# Patient Record
Sex: FEMALE | Race: WHITE | NOT HISPANIC OR LATINO | Employment: OTHER | URBAN - METROPOLITAN AREA
[De-identification: names, ages, dates, MRNs, and addresses within clinical notes are randomized per-mention and may not be internally consistent; named-entity substitution may affect disease eponyms.]

---

## 2023-03-09 LAB
ANION GAP IN SER/PLAS: 12 MMOL/L (ref 10–20)
CALCIUM (MG/DL) IN SER/PLAS: 9.1 MG/DL (ref 8.6–10.3)
CARBON DIOXIDE, TOTAL (MMOL/L) IN SER/PLAS: 30 MMOL/L (ref 21–32)
CHLORIDE (MMOL/L) IN SER/PLAS: 103 MMOL/L (ref 98–107)
CREATININE (MG/DL) IN SER/PLAS: 1.11 MG/DL (ref 0.5–1.05)
GFR FEMALE: 50 ML/MIN/1.73M2
GLUCOSE (MG/DL) IN SER/PLAS: 105 MG/DL (ref 74–99)
MAGNESIUM (MG/DL) IN SER/PLAS: 2.03 MG/DL (ref 1.6–2.4)
POTASSIUM (MMOL/L) IN SER/PLAS: 4.7 MMOL/L (ref 3.5–5.3)
SODIUM (MMOL/L) IN SER/PLAS: 140 MMOL/L (ref 136–145)
UREA NITROGEN (MG/DL) IN SER/PLAS: 23 MG/DL (ref 6–23)

## 2023-04-14 LAB
ANION GAP IN SER/PLAS: 11 MMOL/L (ref 10–20)
CALCIUM (MG/DL) IN SER/PLAS: 9.2 MG/DL (ref 8.6–10.3)
CARBON DIOXIDE, TOTAL (MMOL/L) IN SER/PLAS: 29 MMOL/L (ref 21–32)
CHLORIDE (MMOL/L) IN SER/PLAS: 103 MMOL/L (ref 98–107)
CREATININE (MG/DL) IN SER/PLAS: 1.05 MG/DL (ref 0.5–1.05)
GFR FEMALE: 53 ML/MIN/1.73M2
GLUCOSE (MG/DL) IN SER/PLAS: 89 MG/DL (ref 74–99)
NATRIURETIC PEPTIDE B (PG/ML) IN SER/PLAS: 359 PG/ML (ref 0–99)
POTASSIUM (MMOL/L) IN SER/PLAS: 4.3 MMOL/L (ref 3.5–5.3)
SODIUM (MMOL/L) IN SER/PLAS: 139 MMOL/L (ref 136–145)
UREA NITROGEN (MG/DL) IN SER/PLAS: 21 MG/DL (ref 6–23)

## 2023-04-28 LAB
ANION GAP IN SER/PLAS: 11 MMOL/L (ref 10–20)
CALCIUM (MG/DL) IN SER/PLAS: 9.1 MG/DL (ref 8.6–10.3)
CARBON DIOXIDE, TOTAL (MMOL/L) IN SER/PLAS: 30 MMOL/L (ref 21–32)
CHLORIDE (MMOL/L) IN SER/PLAS: 103 MMOL/L (ref 98–107)
CREATININE (MG/DL) IN SER/PLAS: 1.04 MG/DL (ref 0.5–1.05)
GFR FEMALE: 54 ML/MIN/1.73M2
GLUCOSE (MG/DL) IN SER/PLAS: 109 MG/DL (ref 74–99)
NATRIURETIC PEPTIDE B (PG/ML) IN SER/PLAS: 516 PG/ML (ref 0–99)
POTASSIUM (MMOL/L) IN SER/PLAS: 4.5 MMOL/L (ref 3.5–5.3)
SODIUM (MMOL/L) IN SER/PLAS: 139 MMOL/L (ref 136–145)
UREA NITROGEN (MG/DL) IN SER/PLAS: 21 MG/DL (ref 6–23)

## 2023-05-15 LAB
ANION GAP IN SER/PLAS: 14 MMOL/L (ref 10–20)
C PEPTIDE (NG/ML) IN SER/PLAS: NORMAL
CALCIUM (MG/DL) IN SER/PLAS: 8.7 MG/DL (ref 8.6–10.3)
CARBON DIOXIDE, TOTAL (MMOL/L) IN SER/PLAS: 29 MMOL/L (ref 21–32)
CHLORIDE (MMOL/L) IN SER/PLAS: 102 MMOL/L (ref 98–107)
CREATININE (MG/DL) IN SER/PLAS: 1.17 MG/DL (ref 0.5–1.05)
GFR FEMALE: 47 ML/MIN/1.73M2
GLUCOSE (MG/DL) IN SER/PLAS: 96 MG/DL (ref 74–99)
NATRIURETIC PEPTIDE B (PG/ML) IN SER/PLAS: 313 PG/ML (ref 0–99)
POTASSIUM (MMOL/L) IN SER/PLAS: 4.6 MMOL/L (ref 3.5–5.3)
SODIUM (MMOL/L) IN SER/PLAS: 140 MMOL/L (ref 136–145)
UREA NITROGEN (MG/DL) IN SER/PLAS: 33 MG/DL (ref 6–23)

## 2023-06-01 LAB
APPEARANCE, URINE: CLEAR
BACTERIA, URINE: ABNORMAL /HPF
BILIRUBIN, URINE: NEGATIVE
BLOOD, URINE: NEGATIVE
CHOLESTEROL (MG/DL) IN SER/PLAS: 134 MG/DL (ref 0–199)
CHOLESTEROL IN HDL (MG/DL) IN SER/PLAS: 46 MG/DL
CHOLESTEROL/HDL RATIO: 2.9
COLOR, URINE: ABNORMAL
GLUCOSE, URINE: NEGATIVE MG/DL
HYALINE CASTS, URINE: ABNORMAL /LPF
KETONES, URINE: NEGATIVE MG/DL
LDL: 69 MG/DL (ref 0–99)
LEUKOCYTE ESTERASE, URINE: ABNORMAL
MUCUS, URINE: ABNORMAL /LPF
NITRITE, URINE: NEGATIVE
PH, URINE: 5 (ref 5–8)
PROTEIN, URINE: NEGATIVE MG/DL
RBC, URINE: 2 /HPF (ref 0–5)
SPECIFIC GRAVITY, URINE: 1.01 (ref 1–1.03)
SQUAMOUS EPITHELIAL CELLS, URINE: <1 /HPF
THYROTROPIN (MIU/L) IN SER/PLAS BY DETECTION LIMIT <= 0.05 MIU/L: 3.35 MIU/L (ref 0.44–3.98)
TRANSITIONAL EPITHELIAL CELLS, URINE: <1 /HPF
TRIGLYCERIDE (MG/DL) IN SER/PLAS: 95 MG/DL (ref 0–149)
UROBILINOGEN, URINE: <2 MG/DL (ref 0–1.9)
VLDL: 19 MG/DL (ref 0–40)
WBC, URINE: 3 /HPF (ref 0–5)

## 2023-07-22 LAB
ALANINE AMINOTRANSFERASE (SGPT) (U/L) IN SER/PLAS: 22 U/L (ref 7–45)
ALBUMIN (G/DL) IN SER/PLAS: 4.2 G/DL (ref 3.4–5)
ALKALINE PHOSPHATASE (U/L) IN SER/PLAS: 69 U/L (ref 33–136)
ANION GAP IN SER/PLAS: 13 MMOL/L (ref 10–20)
APPEARANCE, URINE: ABNORMAL
ASPARTATE AMINOTRANSFERASE (SGOT) (U/L) IN SER/PLAS: 23 U/L (ref 9–39)
BASOPHILS (10*3/UL) IN BLOOD BY AUTOMATED COUNT: 0.03 X10E9/L (ref 0–0.1)
BASOPHILS/100 LEUKOCYTES IN BLOOD BY AUTOMATED COUNT: 0.7 % (ref 0–2)
BILIRUBIN TOTAL (MG/DL) IN SER/PLAS: 0.8 MG/DL (ref 0–1.2)
BILIRUBIN, URINE: NEGATIVE
BLOOD, URINE: NEGATIVE
CALCIUM (MG/DL) IN SER/PLAS: 9.4 MG/DL (ref 8.6–10.3)
CARBON DIOXIDE, TOTAL (MMOL/L) IN SER/PLAS: 29 MMOL/L (ref 21–32)
CHLORIDE (MMOL/L) IN SER/PLAS: 104 MMOL/L (ref 98–107)
CHOLESTEROL (MG/DL) IN SER/PLAS: 122 MG/DL (ref 0–199)
CHOLESTEROL IN HDL (MG/DL) IN SER/PLAS: 42.9 MG/DL
CHOLESTEROL/HDL RATIO: 2.8
COLOR, URINE: YELLOW
CREATININE (MG/DL) IN SER/PLAS: 1.05 MG/DL (ref 0.5–1.05)
EOSINOPHILS (10*3/UL) IN BLOOD BY AUTOMATED COUNT: 0.08 X10E9/L (ref 0–0.4)
EOSINOPHILS/100 LEUKOCYTES IN BLOOD BY AUTOMATED COUNT: 1.7 % (ref 0–6)
ERYTHROCYTE DISTRIBUTION WIDTH (RATIO) BY AUTOMATED COUNT: 17 % (ref 11.5–14.5)
ERYTHROCYTE MEAN CORPUSCULAR HEMOGLOBIN CONCENTRATION (G/DL) BY AUTOMATED: 30.4 G/DL (ref 32–36)
ERYTHROCYTE MEAN CORPUSCULAR VOLUME (FL) BY AUTOMATED COUNT: 98 FL (ref 80–100)
ERYTHROCYTES (10*6/UL) IN BLOOD BY AUTOMATED COUNT: 4.11 X10E12/L (ref 4–5.2)
GFR FEMALE: 53 ML/MIN/1.73M2
GLUCOSE (MG/DL) IN SER/PLAS: 102 MG/DL (ref 74–99)
GLUCOSE, URINE: NEGATIVE MG/DL
HEMATOCRIT (%) IN BLOOD BY AUTOMATED COUNT: 40.4 % (ref 36–46)
HEMOGLOBIN (G/DL) IN BLOOD: 12.3 G/DL (ref 12–16)
IMMATURE GRANULOCYTES/100 LEUKOCYTES IN BLOOD BY AUTOMATED COUNT: 0.4 % (ref 0–0.9)
KETONES, URINE: NEGATIVE MG/DL
LDL: 64 MG/DL (ref 0–99)
LEUKOCYTE ESTERASE, URINE: ABNORMAL
LEUKOCYTES (10*3/UL) IN BLOOD BY AUTOMATED COUNT: 4.6 X10E9/L (ref 4.4–11.3)
LYMPHOCYTES (10*3/UL) IN BLOOD BY AUTOMATED COUNT: 0.98 X10E9/L (ref 0.8–3)
LYMPHOCYTES/100 LEUKOCYTES IN BLOOD BY AUTOMATED COUNT: 21.3 % (ref 13–44)
MONOCYTES (10*3/UL) IN BLOOD BY AUTOMATED COUNT: 0.49 X10E9/L (ref 0.05–0.8)
MONOCYTES/100 LEUKOCYTES IN BLOOD BY AUTOMATED COUNT: 10.6 % (ref 2–10)
MUCUS, URINE: NORMAL /LPF
NEUTROPHILS (10*3/UL) IN BLOOD BY AUTOMATED COUNT: 3.01 X10E9/L (ref 1.6–5.5)
NEUTROPHILS/100 LEUKOCYTES IN BLOOD BY AUTOMATED COUNT: 65.3 % (ref 40–80)
NITRITE, URINE: NEGATIVE
NRBC (PER 100 WBCS) BY AUTOMATED COUNT: 0 /100 WBC (ref 0–0)
PH, URINE: 6 (ref 5–8)
PLATELETS (10*3/UL) IN BLOOD AUTOMATED COUNT: 126 X10E9/L (ref 150–450)
POTASSIUM (MMOL/L) IN SER/PLAS: 4.5 MMOL/L (ref 3.5–5.3)
PROTEIN TOTAL: 7 G/DL (ref 6.4–8.2)
PROTEIN, URINE: NEGATIVE MG/DL
RBC, URINE: 1 /HPF (ref 0–5)
SODIUM (MMOL/L) IN SER/PLAS: 141 MMOL/L (ref 136–145)
SPECIFIC GRAVITY, URINE: 1.01 (ref 1–1.03)
SQUAMOUS EPITHELIAL CELLS, URINE: 1 /HPF
THYROTROPIN (MIU/L) IN SER/PLAS BY DETECTION LIMIT <= 0.05 MIU/L: 2.99 MIU/L (ref 0.44–3.98)
TRIGLYCERIDE (MG/DL) IN SER/PLAS: 78 MG/DL (ref 0–149)
UREA NITROGEN (MG/DL) IN SER/PLAS: 29 MG/DL (ref 6–23)
UROBILINOGEN, URINE: <2 MG/DL (ref 0–1.9)
VLDL: 16 MG/DL (ref 0–40)
WBC, URINE: 1 /HPF (ref 0–5)

## 2023-10-17 ENCOUNTER — LAB (OUTPATIENT)
Dept: LAB | Facility: LAB | Age: 83
End: 2023-10-17
Payer: MEDICARE

## 2023-10-17 DIAGNOSIS — E03.9 HYPOTHYROIDISM, UNSPECIFIED: ICD-10-CM

## 2023-10-17 DIAGNOSIS — N18.9 CHRONIC KIDNEY DISEASE, UNSPECIFIED: Primary | ICD-10-CM

## 2023-10-17 DIAGNOSIS — E66.9 OBESITY, UNSPECIFIED: ICD-10-CM

## 2023-10-17 LAB
ALBUMIN SERPL BCP-MCNC: 4.5 G/DL (ref 3.4–5)
ALP SERPL-CCNC: 62 U/L (ref 33–136)
ALT SERPL W P-5'-P-CCNC: 18 U/L (ref 7–45)
ANION GAP SERPL CALC-SCNC: 13 MMOL/L (ref 10–20)
APPEARANCE UR: CLEAR
AST SERPL W P-5'-P-CCNC: 25 U/L (ref 9–39)
BASOPHILS # BLD AUTO: 0.03 X10*3/UL (ref 0–0.1)
BASOPHILS NFR BLD AUTO: 0.7 %
BILIRUB SERPL-MCNC: 0.7 MG/DL (ref 0–1.2)
BILIRUB UR STRIP.AUTO-MCNC: NEGATIVE MG/DL
BUN SERPL-MCNC: 26 MG/DL (ref 6–23)
CALCIUM SERPL-MCNC: 9.2 MG/DL (ref 8.6–10.3)
CHLORIDE SERPL-SCNC: 102 MMOL/L (ref 98–107)
CHOLEST SERPL-MCNC: 178 MG/DL (ref 0–199)
CHOLESTEROL/HDL RATIO: 4.1
CO2 SERPL-SCNC: 30 MMOL/L (ref 21–32)
COLOR UR: ABNORMAL
CREAT SERPL-MCNC: 1.11 MG/DL (ref 0.5–1.05)
EOSINOPHIL # BLD AUTO: 0.09 X10*3/UL (ref 0–0.4)
EOSINOPHIL NFR BLD AUTO: 2 %
ERYTHROCYTE [DISTWIDTH] IN BLOOD BY AUTOMATED COUNT: 15.8 % (ref 11.5–14.5)
GFR SERPL CREATININE-BSD FRML MDRD: 50 ML/MIN/1.73M*2
GLUCOSE SERPL-MCNC: 98 MG/DL (ref 74–99)
GLUCOSE UR STRIP.AUTO-MCNC: NEGATIVE MG/DL
HCT VFR BLD AUTO: 43.8 % (ref 36–46)
HDLC SERPL-MCNC: 43.2 MG/DL
HGB BLD-MCNC: 13.6 G/DL (ref 12–16)
HYALINE CASTS #/AREA URNS AUTO: ABNORMAL /LPF
IMM GRANULOCYTES # BLD AUTO: 0.02 X10*3/UL (ref 0–0.5)
IMM GRANULOCYTES NFR BLD AUTO: 0.4 % (ref 0–0.9)
KETONES UR STRIP.AUTO-MCNC: NEGATIVE MG/DL
LDLC SERPL CALC-MCNC: 106 MG/DL
LEUKOCYTE ESTERASE UR QL STRIP.AUTO: ABNORMAL
LYMPHOCYTES # BLD AUTO: 1.1 X10*3/UL (ref 0.8–3)
LYMPHOCYTES NFR BLD AUTO: 24.1 %
MCH RBC QN AUTO: 30.6 PG (ref 26–34)
MCHC RBC AUTO-ENTMCNC: 31.1 G/DL (ref 32–36)
MCV RBC AUTO: 98 FL (ref 80–100)
MONOCYTES # BLD AUTO: 0.46 X10*3/UL (ref 0.05–0.8)
MONOCYTES NFR BLD AUTO: 10.1 %
MUCOUS THREADS #/AREA URNS AUTO: ABNORMAL /LPF
NEUTROPHILS # BLD AUTO: 2.86 X10*3/UL (ref 1.6–5.5)
NEUTROPHILS NFR BLD AUTO: 62.7 %
NITRITE UR QL STRIP.AUTO: NEGATIVE
NON HDL CHOLESTEROL: 135 MG/DL (ref 0–149)
NRBC BLD-RTO: 0 /100 WBCS (ref 0–0)
PH UR STRIP.AUTO: 5 [PH]
PLATELET # BLD AUTO: 154 X10*3/UL (ref 150–450)
PMV BLD AUTO: 10.5 FL (ref 7.5–11.5)
POTASSIUM SERPL-SCNC: 4.7 MMOL/L (ref 3.5–5.3)
PROT SERPL-MCNC: 7.2 G/DL (ref 6.4–8.2)
PROT UR STRIP.AUTO-MCNC: NEGATIVE MG/DL
RBC # BLD AUTO: 4.45 X10*6/UL (ref 4–5.2)
RBC # UR STRIP.AUTO: NEGATIVE /UL
RBC #/AREA URNS AUTO: ABNORMAL /HPF
SODIUM SERPL-SCNC: 140 MMOL/L (ref 136–145)
SP GR UR STRIP.AUTO: 1.01
TRIGL SERPL-MCNC: 143 MG/DL (ref 0–149)
TSH SERPL-ACNC: 2.81 MIU/L (ref 0.44–3.98)
UROBILINOGEN UR STRIP.AUTO-MCNC: <2 MG/DL
VLDL: 29 MG/DL (ref 0–40)
WBC # BLD AUTO: 4.6 X10*3/UL (ref 4.4–11.3)
WBC #/AREA URNS AUTO: ABNORMAL /HPF

## 2023-10-17 PROCEDURE — 85025 COMPLETE CBC W/AUTO DIFF WBC: CPT

## 2023-10-17 PROCEDURE — 87086 URINE CULTURE/COLONY COUNT: CPT

## 2023-10-17 PROCEDURE — 80061 LIPID PANEL: CPT

## 2023-10-17 PROCEDURE — 36415 COLL VENOUS BLD VENIPUNCTURE: CPT

## 2023-10-17 PROCEDURE — 81001 URINALYSIS AUTO W/SCOPE: CPT

## 2023-10-17 PROCEDURE — 84443 ASSAY THYROID STIM HORMONE: CPT

## 2023-10-17 PROCEDURE — 80053 COMPREHEN METABOLIC PANEL: CPT

## 2023-10-18 LAB — BACTERIA UR CULT: NORMAL

## 2023-12-03 PROBLEM — E03.9 HYPOTHYROID: Status: ACTIVE | Noted: 2023-12-03

## 2023-12-03 PROBLEM — R53.83 FATIGUE: Status: ACTIVE | Noted: 2023-12-03

## 2023-12-03 PROBLEM — M54.50 LOWER BACK PAIN: Status: ACTIVE | Noted: 2023-12-03

## 2023-12-03 PROBLEM — Z71.85 VACCINE COUNSELING: Status: ACTIVE | Noted: 2023-12-03

## 2023-12-03 PROBLEM — R60.0 EDEMA LEG: Status: ACTIVE | Noted: 2023-12-03

## 2023-12-03 PROBLEM — I35.0 AORTIC STENOSIS: Chronic | Status: ACTIVE | Noted: 2023-12-03

## 2023-12-03 PROBLEM — R06.02 SHORTNESS OF BREATH AT REST: Status: ACTIVE | Noted: 2023-12-03

## 2023-12-03 PROBLEM — D64.9 ANEMIA: Status: ACTIVE | Noted: 2023-12-03

## 2023-12-03 PROBLEM — M35.01 KERATITIS SICCA, BILATERAL (MULTI): Status: ACTIVE | Noted: 2018-10-26

## 2023-12-03 PROBLEM — D69.6 THROMBOCYTOPENIA (CMS-HCC): Status: ACTIVE | Noted: 2023-05-11

## 2023-12-03 PROBLEM — H35.363 DEGENERATIVE DRUSEN OF BOTH EYES: Status: ACTIVE | Noted: 2018-10-26

## 2023-12-03 PROBLEM — R09.89 PULMONARY VASCULAR CONGESTION: Status: ACTIVE | Noted: 2023-12-03

## 2023-12-03 PROBLEM — H25.13 SENILE NUCLEAR CATARACT, BILATERAL: Status: ACTIVE | Noted: 2018-10-26

## 2023-12-03 PROBLEM — J84.9 INTERSTITIAL LUNG DISEASE (MULTI): Status: ACTIVE | Noted: 2023-12-03

## 2023-12-03 PROBLEM — E66.9 OBESITY: Status: ACTIVE | Noted: 2023-12-03

## 2023-12-03 PROBLEM — R73.9 HYPERGLYCEMIA: Status: ACTIVE | Noted: 2023-12-03

## 2023-12-03 PROBLEM — I48.91 ATRIAL FIBRILLATION (MULTI): Chronic | Status: ACTIVE | Noted: 2023-12-03

## 2023-12-03 PROBLEM — H16.223 KERATITIS SICCA, BILATERAL: Status: ACTIVE | Noted: 2018-10-26

## 2023-12-03 PROBLEM — M81.0 OSTEOPOROSIS: Status: ACTIVE | Noted: 2023-12-03

## 2023-12-03 PROBLEM — R00.1 BRADYCARDIA: Status: ACTIVE | Noted: 2023-12-03

## 2023-12-03 PROBLEM — R00.1 BRADYCARDIA: Chronic | Status: ACTIVE | Noted: 2023-12-03

## 2023-12-03 PROBLEM — R31.9 HEMATURIA: Status: ACTIVE | Noted: 2023-12-03

## 2023-12-03 PROBLEM — I48.0 PAROXYSMAL ATRIAL FIBRILLATION (MULTI): Chronic | Status: ACTIVE | Noted: 2023-12-03

## 2023-12-03 PROBLEM — I10 HYPERTENSION: Status: ACTIVE | Noted: 2023-12-03

## 2023-12-03 PROBLEM — G47.33 OSA ON CPAP: Status: ACTIVE | Noted: 2023-12-03

## 2023-12-03 PROBLEM — R79.89 LOW VITAMIN D LEVEL: Status: ACTIVE | Noted: 2023-12-03

## 2023-12-03 PROBLEM — N18.9 CHRONIC KIDNEY DISEASE (CKD): Status: ACTIVE | Noted: 2023-12-03

## 2023-12-03 PROBLEM — I34.0 MITRAL VALVE REGURGITATION: Chronic | Status: ACTIVE | Noted: 2023-12-03

## 2023-12-03 PROBLEM — I48.91 AFIB (MULTI): Status: ACTIVE | Noted: 2023-12-03

## 2023-12-05 ENCOUNTER — OFFICE VISIT (OUTPATIENT)
Dept: CARDIOLOGY | Facility: CLINIC | Age: 83
End: 2023-12-05
Payer: MEDICARE

## 2023-12-05 VITALS
HEART RATE: 87 BPM | OXYGEN SATURATION: 91 % | SYSTOLIC BLOOD PRESSURE: 132 MMHG | BODY MASS INDEX: 37.77 KG/M2 | DIASTOLIC BLOOD PRESSURE: 82 MMHG | WEIGHT: 234 LBS

## 2023-12-05 DIAGNOSIS — I35.0 NONRHEUMATIC AORTIC VALVE STENOSIS: Chronic | ICD-10-CM

## 2023-12-05 DIAGNOSIS — I48.0 PAROXYSMAL ATRIAL FIBRILLATION (MULTI): Primary | Chronic | ICD-10-CM

## 2023-12-05 DIAGNOSIS — G47.33 OSA ON CPAP: Chronic | ICD-10-CM

## 2023-12-05 DIAGNOSIS — I34.0 NONRHEUMATIC MITRAL VALVE REGURGITATION: Chronic | ICD-10-CM

## 2023-12-05 DIAGNOSIS — I1A.0 RESISTANT HYPERTENSION: Chronic | ICD-10-CM

## 2023-12-05 PROBLEM — I10 HYPERTENSION: Chronic | Status: ACTIVE | Noted: 2023-12-03

## 2023-12-05 PROCEDURE — 3079F DIAST BP 80-89 MM HG: CPT | Performed by: INTERNAL MEDICINE

## 2023-12-05 PROCEDURE — 1036F TOBACCO NON-USER: CPT | Performed by: INTERNAL MEDICINE

## 2023-12-05 PROCEDURE — 3075F SYST BP GE 130 - 139MM HG: CPT | Performed by: INTERNAL MEDICINE

## 2023-12-05 PROCEDURE — 99214 OFFICE O/P EST MOD 30 MIN: CPT | Performed by: INTERNAL MEDICINE

## 2023-12-05 PROCEDURE — 1159F MED LIST DOCD IN RCRD: CPT | Performed by: INTERNAL MEDICINE

## 2023-12-05 RX ORDER — ALBUTEROL SULFATE 90 UG/1
AEROSOL, METERED RESPIRATORY (INHALATION)
COMMUNITY
Start: 2019-10-28

## 2023-12-05 RX ORDER — METHOTREXATE 2.5 MG/1
15 TABLET ORAL WEEKLY
COMMUNITY
Start: 2019-08-29

## 2023-12-05 RX ORDER — LEVOTHYROXINE SODIUM 25 UG/1
25 TABLET ORAL DAILY
COMMUNITY
Start: 2020-06-08

## 2023-12-05 RX ORDER — MULTIVITAMIN
TABLET ORAL
COMMUNITY
Start: 2006-12-05

## 2023-12-05 RX ORDER — DOXAZOSIN 1 MG/1
1 TABLET ORAL 2 TIMES DAILY
COMMUNITY
Start: 2019-05-15 | End: 2024-05-23 | Stop reason: SDUPTHER

## 2023-12-05 RX ORDER — APIXABAN 5 MG/1
5 TABLET, FILM COATED ORAL 2 TIMES DAILY
COMMUNITY
End: 2023-12-18 | Stop reason: SDUPTHER

## 2023-12-05 RX ORDER — FUROSEMIDE 40 MG/1
40 TABLET ORAL DAILY
COMMUNITY
Start: 2023-05-01

## 2023-12-05 RX ORDER — ALENDRONATE SODIUM 70 MG/1
TABLET ORAL
COMMUNITY
Start: 2023-07-12

## 2023-12-05 RX ORDER — LISINOPRIL 40 MG/1
40 TABLET ORAL 2 TIMES DAILY
COMMUNITY
Start: 2017-09-13

## 2023-12-05 RX ORDER — AMLODIPINE BESYLATE 10 MG/1
TABLET ORAL EVERY 24 HOURS
COMMUNITY
Start: 2019-05-15 | End: 2024-03-27 | Stop reason: SDUPTHER

## 2023-12-05 RX ORDER — FLAXSEED OIL 1000 MG
1 CAPSULE ORAL DAILY
COMMUNITY
Start: 2012-11-06

## 2023-12-05 RX ORDER — DOCUSATE SODIUM 100 MG/1
100 CAPSULE, LIQUID FILLED ORAL EVERY 12 HOURS PRN
COMMUNITY
Start: 2019-10-28

## 2023-12-05 RX ORDER — LORATADINE 10 MG/1
1 TABLET ORAL AS NEEDED
COMMUNITY
Start: 2020-05-19

## 2023-12-05 RX ORDER — NYSTATIN 100000 [USP'U]/G
POWDER TOPICAL
COMMUNITY
Start: 2019-10-28

## 2023-12-05 RX ORDER — UBIDECARENONE 75 MG
CAPSULE ORAL
COMMUNITY

## 2023-12-05 RX ORDER — DEXTROMETHORPHAN HYDROBROMIDE, GUAIFENESIN 5; 100 MG/5ML; MG/5ML
650 LIQUID ORAL 2 TIMES DAILY
COMMUNITY
Start: 2019-05-15

## 2023-12-05 RX ORDER — FOLIC ACID 1 MG/1
1 TABLET ORAL DAILY
COMMUNITY
Start: 2019-08-29

## 2023-12-05 RX ORDER — POTASSIUM CHLORIDE 1500 MG/1
1 TABLET, EXTENDED RELEASE ORAL
COMMUNITY
Start: 2023-02-24

## 2023-12-05 RX ORDER — HYDROXYCHLOROQUINE SULFATE 200 MG/1
200 TABLET, FILM COATED ORAL EVERY 12 HOURS
COMMUNITY
Start: 2023-11-08

## 2023-12-05 NOTE — PROGRESS NOTES
Referred by No ref. provider found    HPI Feeling well. No CP. Improved SOB and edema since starting CPAP. BP excellent at home.     Past Medical History:  Problem List Items Addressed This Visit    None       Past Medical History:   Diagnosis Date    Aortic stenosis 12/03/2023    mild    Atrial fibrillation (CMS/HCC) 12/03/2023    Bradycardia 12/03/2023    Off BB    Essential (primary) hypertension     Benign hypertension    Mitral valve regurgitation 12/03/2023    Mild-moderate    Paroxysmal atrial fibrillation (CMS/HCC) 12/03/2023    PAF newly diagnosed in August 2018. HR controlled by itself. On Eliquis. [id number] score 4.             Past Surgical History:  She has no past surgical history on file.      Social History:  She reports that she quit smoking about 25 years ago. Her smoking use included cigarettes. She has a 50.00 pack-year smoking history. She has been exposed to tobacco smoke. She has never used smokeless tobacco. No history on file for alcohol use and drug use.    Family History:  No family history on file.     Allergies:  Ciprofloxacin, Ibuprofen, Penicillins, Statins-hmg-coa reductase inhibitors, Sulfa (sulfonamide antibiotics), and Zoster vaccine live (pf)    Outpatient Medications:  No current outpatient medications     Last Recorded Vitals:  Vitals:    12/05/23 1608   BP: 132/82   BP Location: Left arm   Patient Position: Sitting   Pulse: 87   SpO2: 91%   Weight: 106 kg (234 lb)       Physical Exam    Physical  Patient is alert and oriented x3.  HEENT is unremarkable mucous members are moist  Neck no JVP no bruits upstrokes are full no thyromegaly  Lungs are clear bilaterally.  No wheezing crackles or rales  Heart regular rhythm normal S1-S2 there is no S3 no murmurs are heard.  Abdomen is soft vessels are positive nontender nondistended no organomegaly no pulsatile masses  Extremities have no edema.  Distal pulses present palpable.  Neuro is grossly nonfocal  Skin has no rashes     Last  Labs:  CBC -  Lab Results   Component Value Date    WBC 4.6 10/17/2023    HGB 13.6 10/17/2023    HCT 43.8 10/17/2023    MCV 98 10/17/2023     10/17/2023       CMP -  Lab Results   Component Value Date    CALCIUM 9.2 10/17/2023    PROT 7.2 10/17/2023    ALBUMIN 4.5 10/17/2023    AST 25 10/17/2023    ALT 18 10/17/2023    ALKPHOS 62 10/17/2023    BILITOT 0.7 10/17/2023       LIPID PANEL -   Lab Results   Component Value Date    CHOL 178 10/17/2023    HDL 43.2 10/17/2023    CHHDL 4.1 10/17/2023    VLDL 29 10/17/2023    TRIG 143 10/17/2023    NHDL 135 10/17/2023       RENAL FUNCTION PANEL -   Lab Results   Component Value Date    K 4.7 10/17/2023       Lab Results   Component Value Date     (H) 05/15/2023    HGBA1C 5.0 08/08/2022     Procedure  VENOUS U/S [06/09/2023]: No ev/of DVT.      ECHO [02/03/2023]: EF 55-60%. Low normal RVSF. LA mod dial. RA mild-mod dial. Mild-mod MR. AV sclerosis.Echo 9/4/18 EF 60-65%     ECHO [09/21/2022]: EF 60%, Mild AS     CXR [05/15/2019]: No interval change from 3/1/2018.     PHARM NST [09/04/2018]: Normal - 70%     CXR (03/01/2018): Mild cardiomegaly prominence of central pulm vasculature.     CAROTID [11/27/2017]: Less than 50% stenosis prox MARY / LICA     ECHOÂ [11/08/2016]: EF 55-60%. Mod LVH. Mildly dial LA.          Assessment/Plan   1. Perm Afib. Chads Vasc is 4.No further bleeding. Con't of Eliquis. Off ASA. Con't with AC with DOAC.     2.HTN-BP well controlled. Con't cardura 1 bid, lisinopril 40 BID, amlodipine 10mg/day, and lasix 40mg/day.     3. Lipids- followed by Dr. Dupree.     4. 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 Seen by Pulm. Recent breathing test was fine.     5. DCHF-BNP > 300.  She will continue with Lasix 40 mg daily.  Minimal lower extremity edema.  I believe a lot of this is improved since she is been started on CPAP for her MARTA.     6. MARTA-doing great with CPAP.  Feels better.  Lower extremity edema is improved.     RTC 6 months    Judy Ray,  MD     Instructions and follow up

## 2023-12-05 NOTE — PATIENT INSTRUCTIONS
1. Perm Afib. Chads Vasc is 4.No further bleeding. Con't of Eliquis. Off ASA. Con't with AC with DOAC.     2.HTN-BP well controlled. Con't cardura 1 bid, lisinopril 40 BID, amlodipine 10mg/day, and lasix 40mg/day.     3. Lipids- followed by Dr. Dupree.     4. 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 Seen by Pulm. Recent breathing test was fine.     5. DCHF-BNP > 300.  She will continue with Lasix 40 mg daily.  Minimal lower extremity edema.  I believe a lot of this is improved since she is been started on CPAP for her MARTA.     6. MARTA-doing great with CPAP.  Feels better.  Lower extremity edema is improved.     RTC 6 months

## 2023-12-18 DIAGNOSIS — I48.0 PAROXYSMAL ATRIAL FIBRILLATION (MULTI): Primary | Chronic | ICD-10-CM

## 2023-12-19 RX ORDER — APIXABAN 5 MG/1
5 TABLET, FILM COATED ORAL 2 TIMES DAILY
Qty: 60 TABLET | Refills: 11 | Status: SHIPPED | OUTPATIENT
Start: 2023-12-19

## 2024-01-22 ENCOUNTER — LAB (OUTPATIENT)
Dept: LAB | Facility: LAB | Age: 84
End: 2024-01-22
Payer: MEDICARE

## 2024-01-22 DIAGNOSIS — E78.5 HYPERLIPIDEMIA, UNSPECIFIED: ICD-10-CM

## 2024-01-22 DIAGNOSIS — M06.9 RHEUMATOID ARTHRITIS, UNSPECIFIED (MULTI): Primary | ICD-10-CM

## 2024-01-22 DIAGNOSIS — E03.9 HYPOTHYROIDISM, UNSPECIFIED: ICD-10-CM

## 2024-01-22 DIAGNOSIS — R78.5 FINDING OF OTHER PSYCHOTROPIC DRUG IN BLOOD: ICD-10-CM

## 2024-01-22 LAB
ALBUMIN SERPL BCP-MCNC: 4.5 G/DL (ref 3.4–5)
ALP SERPL-CCNC: 59 U/L (ref 33–136)
ALT SERPL W P-5'-P-CCNC: 17 U/L (ref 7–45)
ANION GAP SERPL CALC-SCNC: 11 MMOL/L (ref 10–20)
AST SERPL W P-5'-P-CCNC: 24 U/L (ref 9–39)
BASOPHILS # BLD AUTO: 0.02 X10*3/UL (ref 0–0.1)
BASOPHILS NFR BLD AUTO: 0.4 %
BILIRUB SERPL-MCNC: 0.7 MG/DL (ref 0–1.2)
BUN SERPL-MCNC: 26 MG/DL (ref 6–23)
CALCIUM SERPL-MCNC: 9.3 MG/DL (ref 8.6–10.3)
CHLORIDE SERPL-SCNC: 101 MMOL/L (ref 98–107)
CHOLEST SERPL-MCNC: 185 MG/DL (ref 0–199)
CHOLESTEROL/HDL RATIO: 3.5
CO2 SERPL-SCNC: 32 MMOL/L (ref 21–32)
CREAT SERPL-MCNC: 1.05 MG/DL (ref 0.5–1.05)
CRP SERPL-MCNC: 0.32 MG/DL
EGFRCR SERPLBLD CKD-EPI 2021: 53 ML/MIN/1.73M*2
EOSINOPHIL # BLD AUTO: 0.12 X10*3/UL (ref 0–0.4)
EOSINOPHIL NFR BLD AUTO: 2.2 %
ERYTHROCYTE [DISTWIDTH] IN BLOOD BY AUTOMATED COUNT: 14.8 % (ref 11.5–14.5)
ERYTHROCYTE [SEDIMENTATION RATE] IN BLOOD BY WESTERGREN METHOD: 17 MM/H (ref 0–30)
EST. AVERAGE GLUCOSE BLD GHB EST-MCNC: 105 MG/DL
GLUCOSE SERPL-MCNC: 95 MG/DL (ref 74–99)
HBA1C MFR BLD: 5.3 %
HCT VFR BLD AUTO: 42.3 % (ref 36–46)
HDLC SERPL-MCNC: 53.6 MG/DL
HGB BLD-MCNC: 13.4 G/DL (ref 12–16)
IMM GRANULOCYTES # BLD AUTO: 0.05 X10*3/UL (ref 0–0.5)
IMM GRANULOCYTES NFR BLD AUTO: 0.9 % (ref 0–0.9)
LDLC SERPL CALC-MCNC: 102 MG/DL
LYMPHOCYTES # BLD AUTO: 1.03 X10*3/UL (ref 0.8–3)
LYMPHOCYTES NFR BLD AUTO: 19.3 %
MCH RBC QN AUTO: 32.5 PG (ref 26–34)
MCHC RBC AUTO-ENTMCNC: 31.7 G/DL (ref 32–36)
MCV RBC AUTO: 103 FL (ref 80–100)
MONOCYTES # BLD AUTO: 0.46 X10*3/UL (ref 0.05–0.8)
MONOCYTES NFR BLD AUTO: 8.6 %
NEUTROPHILS # BLD AUTO: 3.66 X10*3/UL (ref 1.6–5.5)
NEUTROPHILS NFR BLD AUTO: 68.6 %
NON HDL CHOLESTEROL: 131 MG/DL (ref 0–149)
NRBC BLD-RTO: 0 /100 WBCS (ref 0–0)
PLATELET # BLD AUTO: 156 X10*3/UL (ref 150–450)
POTASSIUM SERPL-SCNC: 4.2 MMOL/L (ref 3.5–5.3)
PROT SERPL-MCNC: 7.2 G/DL (ref 6.4–8.2)
RBC # BLD AUTO: 4.12 X10*6/UL (ref 4–5.2)
SODIUM SERPL-SCNC: 140 MMOL/L (ref 136–145)
TRIGL SERPL-MCNC: 145 MG/DL (ref 0–149)
TSH SERPL-ACNC: 2.74 MIU/L (ref 0.44–3.98)
VLDL: 29 MG/DL (ref 0–40)
WBC # BLD AUTO: 5.3 X10*3/UL (ref 4.4–11.3)

## 2024-01-22 PROCEDURE — 85025 COMPLETE CBC W/AUTO DIFF WBC: CPT

## 2024-01-22 PROCEDURE — 85652 RBC SED RATE AUTOMATED: CPT

## 2024-01-22 PROCEDURE — 80061 LIPID PANEL: CPT

## 2024-01-22 PROCEDURE — 36415 COLL VENOUS BLD VENIPUNCTURE: CPT

## 2024-01-22 PROCEDURE — 80053 COMPREHEN METABOLIC PANEL: CPT

## 2024-01-22 PROCEDURE — 86140 C-REACTIVE PROTEIN: CPT

## 2024-01-22 PROCEDURE — 84443 ASSAY THYROID STIM HORMONE: CPT

## 2024-01-22 PROCEDURE — 83036 HEMOGLOBIN GLYCOSYLATED A1C: CPT

## 2024-03-27 DIAGNOSIS — I10 HYPERTENSION, UNSPECIFIED TYPE: Chronic | ICD-10-CM

## 2024-03-27 RX ORDER — AMLODIPINE BESYLATE 10 MG/1
10 TABLET ORAL DAILY
Qty: 90 TABLET | Refills: 3 | Status: SHIPPED | OUTPATIENT
Start: 2024-03-27

## 2024-04-22 ENCOUNTER — LAB (OUTPATIENT)
Dept: LAB | Facility: LAB | Age: 84
End: 2024-04-22
Payer: MEDICARE

## 2024-04-22 DIAGNOSIS — E66.9 OBESITY, UNSPECIFIED: Primary | ICD-10-CM

## 2024-04-22 DIAGNOSIS — I48.91 UNSPECIFIED ATRIAL FIBRILLATION (MULTI): ICD-10-CM

## 2024-04-22 DIAGNOSIS — M06.9 RHEUMATOID ARTHRITIS, UNSPECIFIED (MULTI): ICD-10-CM

## 2024-04-22 LAB
ALBUMIN SERPL BCP-MCNC: 4.3 G/DL (ref 3.4–5)
ALP SERPL-CCNC: 60 U/L (ref 33–136)
ALT SERPL W P-5'-P-CCNC: 21 U/L (ref 7–45)
ANION GAP SERPL CALC-SCNC: 12 MMOL/L (ref 10–20)
AST SERPL W P-5'-P-CCNC: 24 U/L (ref 9–39)
BASOPHILS # BLD AUTO: 0.02 X10*3/UL (ref 0–0.1)
BASOPHILS NFR BLD AUTO: 0.4 %
BILIRUB SERPL-MCNC: 0.9 MG/DL (ref 0–1.2)
BUN SERPL-MCNC: 30 MG/DL (ref 6–23)
CALCIUM SERPL-MCNC: 8.9 MG/DL (ref 8.6–10.3)
CHLORIDE SERPL-SCNC: 103 MMOL/L (ref 98–107)
CHOLEST SERPL-MCNC: 165 MG/DL (ref 0–199)
CHOLESTEROL/HDL RATIO: 3.1
CO2 SERPL-SCNC: 30 MMOL/L (ref 21–32)
CREAT SERPL-MCNC: 0.97 MG/DL (ref 0.5–1.05)
EGFRCR SERPLBLD CKD-EPI 2021: 58 ML/MIN/1.73M*2
EOSINOPHIL # BLD AUTO: 0.11 X10*3/UL (ref 0–0.4)
EOSINOPHIL NFR BLD AUTO: 2.2 %
ERYTHROCYTE [DISTWIDTH] IN BLOOD BY AUTOMATED COUNT: 15.4 % (ref 11.5–14.5)
GLUCOSE SERPL-MCNC: 88 MG/DL (ref 74–99)
HCT VFR BLD AUTO: 39.2 % (ref 36–46)
HDLC SERPL-MCNC: 52.9 MG/DL
HGB BLD-MCNC: 12.5 G/DL (ref 12–16)
IMM GRANULOCYTES # BLD AUTO: 0.03 X10*3/UL (ref 0–0.5)
IMM GRANULOCYTES NFR BLD AUTO: 0.6 % (ref 0–0.9)
LDLC SERPL CALC-MCNC: 88 MG/DL
LYMPHOCYTES # BLD AUTO: 0.82 X10*3/UL (ref 0.8–3)
LYMPHOCYTES NFR BLD AUTO: 16.3 %
MCH RBC QN AUTO: 32.1 PG (ref 26–34)
MCHC RBC AUTO-ENTMCNC: 31.9 G/DL (ref 32–36)
MCV RBC AUTO: 101 FL (ref 80–100)
MONOCYTES # BLD AUTO: 0.46 X10*3/UL (ref 0.05–0.8)
MONOCYTES NFR BLD AUTO: 9.1 %
NEUTROPHILS # BLD AUTO: 3.59 X10*3/UL (ref 1.6–5.5)
NEUTROPHILS NFR BLD AUTO: 71.4 %
NON HDL CHOLESTEROL: 112 MG/DL (ref 0–149)
NRBC BLD-RTO: 0 /100 WBCS (ref 0–0)
PLATELET # BLD AUTO: 145 X10*3/UL (ref 150–450)
POTASSIUM SERPL-SCNC: 4.3 MMOL/L (ref 3.5–5.3)
PROT SERPL-MCNC: 7 G/DL (ref 6.4–8.2)
RBC # BLD AUTO: 3.9 X10*6/UL (ref 4–5.2)
SODIUM SERPL-SCNC: 141 MMOL/L (ref 136–145)
TRIGL SERPL-MCNC: 123 MG/DL (ref 0–149)
TSH SERPL-ACNC: 2.61 MIU/L (ref 0.44–3.98)
VLDL: 25 MG/DL (ref 0–40)
WBC # BLD AUTO: 5 X10*3/UL (ref 4.4–11.3)

## 2024-04-22 PROCEDURE — 36415 COLL VENOUS BLD VENIPUNCTURE: CPT

## 2024-04-22 PROCEDURE — 85025 COMPLETE CBC W/AUTO DIFF WBC: CPT

## 2024-04-22 PROCEDURE — 80061 LIPID PANEL: CPT

## 2024-04-22 PROCEDURE — 84443 ASSAY THYROID STIM HORMONE: CPT

## 2024-04-22 PROCEDURE — 80053 COMPREHEN METABOLIC PANEL: CPT

## 2024-05-07 PROBLEM — E03.9 HYPOTHYROIDISM: Status: ACTIVE | Noted: 2023-10-17

## 2024-05-07 PROBLEM — E78.5 HYPERLIPIDEMIA: Status: ACTIVE | Noted: 2023-10-25

## 2024-05-07 PROBLEM — E66.9 OBESITY WITH BODY MASS INDEX (BMI) OF 30.0 TO 39.9: Status: ACTIVE | Noted: 2023-10-25

## 2024-05-07 PROBLEM — M25.559 ARTHRALGIA OF HIP: Status: ACTIVE | Noted: 2024-05-07

## 2024-05-07 PROBLEM — I51.9 LEFT VENTRICULAR SYSTOLIC DYSFUNCTION (LVSD): Status: ACTIVE | Noted: 2024-05-07

## 2024-05-07 PROBLEM — R60.0 LOCALIZED EDEMA: Status: ACTIVE | Noted: 2022-09-21

## 2024-05-07 PROBLEM — I51.89 LEFT VENTRICULAR SYSTOLIC DYSFUNCTION (LVSD): Status: ACTIVE | Noted: 2024-05-07

## 2024-05-16 ENCOUNTER — HOSPITAL ENCOUNTER (OUTPATIENT)
Dept: RADIOLOGY | Facility: CLINIC | Age: 84
Discharge: HOME | End: 2024-05-16
Payer: MEDICARE

## 2024-05-16 DIAGNOSIS — R91.1 SOLITARY PULMONARY NODULE: ICD-10-CM

## 2024-05-16 PROCEDURE — 71250 CT THORAX DX C-: CPT

## 2024-05-16 PROCEDURE — 71250 CT THORAX DX C-: CPT | Performed by: RADIOLOGY

## 2024-05-22 PROBLEM — R60.0 LOCALIZED EDEMA: Chronic | Status: ACTIVE | Noted: 2022-09-21

## 2024-05-22 NOTE — PROGRESS NOTES
Referred by No ref. provider found    HPI     Past Medical History:  Problem List Items Addressed This Visit    None       Past Medical History:   Diagnosis Date    Aortic stenosis 12/03/2023    mild    Atrial fibrillation (Multi) 12/03/2023    Bradycardia 12/03/2023    Off BB    Essential (primary) hypertension     Benign hypertension    Hypertension 12/03/2023    Mitral valve regurgitation 12/03/2023    Mild-moderate    Obstructive sleep apnea syndrome 12/03/2023    CPAP    MARTA on CPAP 12/03/2023    Paroxysmal atrial fibrillation (Multi) 12/03/2023    PAF newly diagnosed in August 2018. HR controlled by itself. On Eliquis. [id number] score 4.             Past Surgical History:  She has no past surgical history on file.      Social History:  She reports that she quit smoking about 26 years ago. Her smoking use included cigarettes. She started smoking about 76 years ago. She has a 50 pack-year smoking history. She has been exposed to tobacco smoke. She has never used smokeless tobacco. No history on file for alcohol use and drug use.    Family History:  No family history on file.     Allergies:  Ciprofloxacin, Ibuprofen, Penicillins, Statins-hmg-coa reductase inhibitors, Sulfa (sulfonamide antibiotics), and Zoster vaccine live (pf)    Outpatient Medications:  Current Outpatient Medications   Medication Instructions    acetaminophen (TYLENOL 8 HOUR) 650 mg, oral, 2 times daily    albuterol 90 mcg/actuation inhaler As needed    alendronate (Fosamax) 70 mg tablet See Instructions, TAKE 1 TABLET ONCE A WEEK ON MONDAYS, 12 tabs, 84, Date: 07/12/2023 08:03:00 [facility] Mail Delivery, Instructions Replace Required Details, TAKE 1 TABLET ONCE A WEEK ON MONDAYS    amLODIPine (NORVASC) 10 mg, oral, Daily    cyanocobalamin (Vitamin B-12) 500 mcg tablet oral, Daily RT    docusate sodium (COLACE) 100 mg, oral, Every 12 hours PRN    doxazosin (CARDURA) 1 mg, oral, 2 times daily    Eliquis 5 mg, oral, 2 times  daily    flaxseed oiL 1,000 mg capsule 1 capsule, oral, Daily    folic acid (FOLVITE) 1 mg, oral, Daily    hydroxychloroquine (PLAQUENIL) 200 mg, oral, Every 12 hours    Lasix 40 mg, oral, Daily    levothyroxine (SYNTHROID, LEVOXYL) 25 mcg, oral, Daily    lisinopril 40 mg, oral, 2 times daily    loratadine (Claritin) 10 mg tablet 1 tablet, oral, As needed    methotrexate (TREXALL) 15 mg, oral, Weekly    multivitamin tablet oral    nystatin (Mycostatin) 100,000 unit/gram powder As needed    potassium chloride CR 20 mEq ER tablet 1 tablet, oral, Daily RT    vitamins A,C,E-zinc-copper 2,148 mcg-113 mg-45 mg-17.4mg tablet Daily        Last Recorded Vitals:  There were no vitals filed for this visit.      Physical Exam    Physical  Patient is alert and oriented x3.  HEENT is unremarkable mucous members are moist  Neck no JVP no bruits upstrokes are full no thyromegaly  Lungs are clear bilaterally.  No wheezing crackles or rales  Heart regular rhythm normal S1-S2 there is no S3 no murmurs are heard.  Abdomen is soft vessels are positive nontender nondistended no organomegaly no pulsatile masses  Extremities have no edema.  Distal pulses present palpable.  Neuro is grossly nonfocal  Skin has no rashes     Last Labs:  CBC -  Lab Results   Component Value Date    WBC 5.0 04/22/2024    HGB 12.5 04/22/2024    HCT 39.2 04/22/2024     (H) 04/22/2024     (L) 04/22/2024       CMP -  Lab Results   Component Value Date    CALCIUM 8.9 04/22/2024    PROT 7.0 04/22/2024    ALBUMIN 4.3 04/22/2024    AST 24 04/22/2024    ALT 21 04/22/2024    ALKPHOS 60 04/22/2024    BILITOT 0.9 04/22/2024       LIPID PANEL -   Lab Results   Component Value Date    CHOL 165 04/22/2024    HDL 52.9 04/22/2024    CHHDL 3.1 04/22/2024    VLDL 25 04/22/2024    TRIG 123 04/22/2024    NHDL 112 04/22/2024       RENAL FUNCTION PANEL -   Lab Results   Component Value Date    K 4.3 04/22/2024       Lab Results   Component Value Date     (H)  05/15/2023    HGBA1C 5.3 01/22/2024     Procedure    CT chest 5/16/24 cor calcification    VENOUS U/S [06/09/2023]: No ev/of DVT.      ECHO [02/03/2023]: EF 55-60%. Low normal RVSF. LA mod dial. RA mild-mod dial. Mild-mod MR. AV sclerosis.Echo 9/4/18 EF 60-65%     ECHO [09/21/2022]: EF 60%, Mild AS     CXR [05/15/2019]: No interval change from 3/1/2018.     PHARM NST [09/04/2018]: Normal - 70%     CXR (03/01/2018): Mild cardiomegaly prominence of central pulm vasculature.     CAROTID [11/27/2017]: Less than 50% stenosis prox MARY / LICA     ECHOÂ [11/08/2016]: EF 55-60%. Mod LVH. Mildly dial LA.          Assessment/Plan   1. Perm Afib. Chads Vasc is 4.No further bleeding. Con't of Eliquis. Off ASA. Con't with AC with DOAC.     2.HTN-BP well controlled. Con't cardura 1 bid, lisinopril 40 BID, amlodipine 10mg/day, and lasix 40mg/day.     3. Lipids- followed by Dr. Dupree. Baseline LDL 88. Unable to tolerate statin.  She has tried 3 different statins but does not recall the names.  She will go home and try to figure out what they are.  If I can get her on a low-dose of a statin for the anti-inflammatory benefits this will be beneficial given the presence of coronary artery disease the active calcification     4. 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 Seen by Pulm. Recent breathing test was fine.     5. DCHF-BNP > 300.  She will continue with Lasix 40 mg daily.  Minimal lower extremity edema.  I believe a lot of this is improved since she is been started on CPAP for her MARTA.     6. MARTA-doing great with CPAP.  Feels better.  Lower extremity edema is improved.    7. CAD- mild calcification noted on a CT scan 5/2024. Will try statins when she returns. She does have mild midsternal CP. Often reproducible with palp. Will proceed with a reganuc.    Rega nuc . RTC 12 wks. She'll try to find her records on which statin she was on and intolerant       Judy Ray MD     Instructions and follow up

## 2024-05-23 ENCOUNTER — OFFICE VISIT (OUTPATIENT)
Dept: CARDIOLOGY | Facility: CLINIC | Age: 84
End: 2024-05-23
Payer: MEDICARE

## 2024-05-23 VITALS
OXYGEN SATURATION: 96 % | HEART RATE: 78 BPM | WEIGHT: 237 LBS | DIASTOLIC BLOOD PRESSURE: 76 MMHG | SYSTOLIC BLOOD PRESSURE: 128 MMHG | BODY MASS INDEX: 38.25 KG/M2

## 2024-05-23 DIAGNOSIS — R07.89 CHEST TIGHTNESS: ICD-10-CM

## 2024-05-23 DIAGNOSIS — I48.0 PAROXYSMAL ATRIAL FIBRILLATION (MULTI): Chronic | ICD-10-CM

## 2024-05-23 DIAGNOSIS — I10 PRIMARY HYPERTENSION: Chronic | ICD-10-CM

## 2024-05-23 DIAGNOSIS — I34.0 NONRHEUMATIC MITRAL VALVE REGURGITATION: Chronic | ICD-10-CM

## 2024-05-23 DIAGNOSIS — I51.9 LEFT VENTRICULAR SYSTOLIC DYSFUNCTION (LVSD): ICD-10-CM

## 2024-05-23 DIAGNOSIS — R00.1 BRADYCARDIA: Chronic | ICD-10-CM

## 2024-05-23 DIAGNOSIS — E78.2 MIXED HYPERLIPIDEMIA: Primary | ICD-10-CM

## 2024-05-23 DIAGNOSIS — I25.10 CORONARY ARTERY DISEASE INVOLVING NATIVE CORONARY ARTERY OF NATIVE HEART WITHOUT ANGINA PECTORIS: Chronic | ICD-10-CM

## 2024-05-23 PROBLEM — R91.1 LUNG NODULE: Status: ACTIVE | Noted: 2024-04-29

## 2024-05-23 PROBLEM — E78.5 HYPERLIPIDEMIA: Chronic | Status: ACTIVE | Noted: 2023-10-25

## 2024-05-23 PROBLEM — B37.2 CANDIDIASIS OF SKIN: Status: ACTIVE | Noted: 2024-04-29

## 2024-05-23 PROCEDURE — 1159F MED LIST DOCD IN RCRD: CPT | Performed by: INTERNAL MEDICINE

## 2024-05-23 PROCEDURE — 1160F RVW MEDS BY RX/DR IN RCRD: CPT | Performed by: INTERNAL MEDICINE

## 2024-05-23 PROCEDURE — 1036F TOBACCO NON-USER: CPT | Performed by: INTERNAL MEDICINE

## 2024-05-23 PROCEDURE — 99214 OFFICE O/P EST MOD 30 MIN: CPT | Performed by: INTERNAL MEDICINE

## 2024-05-23 PROCEDURE — 3078F DIAST BP <80 MM HG: CPT | Performed by: INTERNAL MEDICINE

## 2024-05-23 PROCEDURE — 3074F SYST BP LT 130 MM HG: CPT | Performed by: INTERNAL MEDICINE

## 2024-05-23 RX ORDER — SULFASALAZINE 500 MG/1
1000 TABLET, DELAYED RELEASE ORAL
COMMUNITY

## 2024-05-23 RX ORDER — FAMOTIDINE 20 MG/1
20 TABLET, FILM COATED ORAL
COMMUNITY
Start: 2024-04-29

## 2024-05-23 RX ORDER — DOXAZOSIN 1 MG/1
1 TABLET ORAL 2 TIMES DAILY
Qty: 180 TABLET | Refills: 3 | Status: SHIPPED | OUTPATIENT
Start: 2024-05-23 | End: 2025-05-23

## 2024-05-23 NOTE — PATIENT INSTRUCTIONS
1. Perm Afib. Chads Vasc is 4.No further bleeding. Con't of Eliquis. Off ASA. Con't with AC with DOAC.     2.HTN-BP well controlled. Con't cardura 1 bid, lisinopril 40 BID, amlodipine 10mg/day, and lasix 40mg/day.     3. Lipids- followed by Dr. Dupree. Baseline LDL 88. Unable to tolerate statin.  She has tried 3 different statins but does not recall the names.  She will go home and try to figure out what they are.  If I can get her on a low-dose of a statin for the anti-inflammatory benefits this will be beneficial given the presence of coronary artery disease the active calcification     4. 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 Seen by Pulm. Recent breathing test was fine.     5. DCHF-BNP > 300.  She will continue with Lasix 40 mg daily.  Minimal lower extremity edema.  I believe a lot of this is improved since she is been started on CPAP for her MARTA.     6. MARTA-doing great with CPAP.  Feels better.  Lower extremity edema is improved.    7. CAD- mild calcification noted on a CT scan 5/2024. Will try statins when she returns. She does have mild midsternal CP. Often reproducible with palp. Will proceed with a reganuc.    Rega nuc . RTC 12 wks. She'll try to find her records on which statin she was on and intolerant

## 2024-06-03 ENCOUNTER — HOSPITAL ENCOUNTER (OUTPATIENT)
Dept: RADIOLOGY | Facility: CLINIC | Age: 84
Discharge: HOME | End: 2024-06-03
Payer: MEDICARE

## 2024-06-03 ENCOUNTER — HOSPITAL ENCOUNTER (OUTPATIENT)
Dept: CARDIOLOGY | Facility: CLINIC | Age: 84
Discharge: HOME | End: 2024-06-03
Payer: MEDICARE

## 2024-06-03 DIAGNOSIS — R06.02 SHORTNESS OF BREATH AT REST: Primary | ICD-10-CM

## 2024-06-03 DIAGNOSIS — R07.89 CHEST TIGHTNESS: ICD-10-CM

## 2024-06-03 DIAGNOSIS — I25.10 CORONARY ARTERY DISEASE INVOLVING NATIVE CORONARY ARTERY OF NATIVE HEART WITHOUT ANGINA PECTORIS: Primary | Chronic | ICD-10-CM

## 2024-06-03 DIAGNOSIS — I25.10 CAD (CORONARY ARTERY DISEASE): ICD-10-CM

## 2024-06-03 DIAGNOSIS — R07.89 CHEST TIGHTNESS: Primary | ICD-10-CM

## 2024-06-03 PROCEDURE — 93018 CV STRESS TEST I&R ONLY: CPT | Performed by: INTERNAL MEDICINE

## 2024-06-03 PROCEDURE — 2500000004 HC RX 250 GENERAL PHARMACY W/ HCPCS (ALT 636 FOR OP/ED): Performed by: INTERNAL MEDICINE

## 2024-06-03 PROCEDURE — 78452 HT MUSCLE IMAGE SPECT MULT: CPT | Performed by: INTERNAL MEDICINE

## 2024-06-03 PROCEDURE — 78452 HT MUSCLE IMAGE SPECT MULT: CPT

## 2024-06-03 PROCEDURE — 93017 CV STRESS TEST TRACING ONLY: CPT

## 2024-06-03 PROCEDURE — 93016 CV STRESS TEST SUPVJ ONLY: CPT | Performed by: INTERNAL MEDICINE

## 2024-06-03 RX ORDER — REGADENOSON 0.08 MG/ML
0.4 INJECTION, SOLUTION INTRAVENOUS ONCE
Status: COMPLETED | OUTPATIENT
Start: 2024-06-03 | End: 2024-06-03

## 2024-06-03 RX ADMIN — REGADENOSON 0.4 MG: 0.08 INJECTION, SOLUTION INTRAVENOUS at 12:05

## 2024-06-12 ENCOUNTER — APPOINTMENT (OUTPATIENT)
Dept: CARDIOLOGY | Facility: CLINIC | Age: 84
End: 2024-06-12
Payer: MEDICARE

## 2024-07-31 ENCOUNTER — HOSPITAL ENCOUNTER (OUTPATIENT)
Dept: RADIOLOGY | Facility: CLINIC | Age: 84
Discharge: HOME | End: 2024-07-31
Payer: MEDICARE

## 2024-07-31 VITALS — WEIGHT: 238.1 LBS | BODY MASS INDEX: 38.27 KG/M2 | HEIGHT: 66 IN

## 2024-07-31 DIAGNOSIS — M81.0 AGE-RELATED OSTEOPOROSIS WITHOUT CURRENT PATHOLOGICAL FRACTURE: ICD-10-CM

## 2024-07-31 DIAGNOSIS — Z12.31 ENCOUNTER FOR SCREENING MAMMOGRAM FOR MALIGNANT NEOPLASM OF BREAST: ICD-10-CM

## 2024-07-31 PROBLEM — I25.84 CORONARY ARTERY CALCIFICATION: Chronic | Status: ACTIVE | Noted: 2024-05-23

## 2024-07-31 PROBLEM — I25.84 CORONARY ARTERY CALCIFICATION: Status: ACTIVE | Noted: 2024-05-23

## 2024-07-31 PROBLEM — I25.10 CORONARY ARTERY DISEASE WITHOUT ANGINA PECTORIS: Status: ACTIVE | Noted: 2024-05-23

## 2024-07-31 PROCEDURE — 77080 DXA BONE DENSITY AXIAL: CPT

## 2024-07-31 PROCEDURE — 77067 SCR MAMMO BI INCL CAD: CPT

## 2024-07-31 PROCEDURE — 77067 SCR MAMMO BI INCL CAD: CPT | Performed by: RADIOLOGY

## 2024-07-31 PROCEDURE — 77063 BREAST TOMOSYNTHESIS BI: CPT | Performed by: RADIOLOGY

## 2024-07-31 ASSESSMENT — LIFESTYLE VARIABLES
CURRENT_SMOKER: N
3_OR_MORE_DRINKS_PER_DAY: N

## 2024-08-28 ENCOUNTER — APPOINTMENT (OUTPATIENT)
Dept: CARDIOLOGY | Facility: CLINIC | Age: 84
End: 2024-08-28
Payer: MEDICARE

## 2024-08-28 VITALS
BODY MASS INDEX: 38.27 KG/M2 | OXYGEN SATURATION: 96 % | DIASTOLIC BLOOD PRESSURE: 76 MMHG | SYSTOLIC BLOOD PRESSURE: 134 MMHG | WEIGHT: 237 LBS | HEART RATE: 63 BPM

## 2024-08-28 DIAGNOSIS — I34.0 NONRHEUMATIC MITRAL VALVE REGURGITATION: Chronic | ICD-10-CM

## 2024-08-28 DIAGNOSIS — I48.0 PAROXYSMAL ATRIAL FIBRILLATION (MULTI): Chronic | ICD-10-CM

## 2024-08-28 DIAGNOSIS — I35.0 NONRHEUMATIC AORTIC VALVE STENOSIS: Primary | Chronic | ICD-10-CM

## 2024-08-28 DIAGNOSIS — I10 PRIMARY HYPERTENSION: Chronic | ICD-10-CM

## 2024-08-28 DIAGNOSIS — E78.2 MIXED HYPERLIPIDEMIA: Chronic | ICD-10-CM

## 2024-08-28 DIAGNOSIS — R07.89 CHEST TIGHTNESS: ICD-10-CM

## 2024-08-28 PROBLEM — H16.223 KERATITIS SICCA, BILATERAL: Status: RESOLVED | Noted: 2018-10-26 | Resolved: 2024-08-28

## 2024-08-28 PROBLEM — M35.01 KERATITIS SICCA, BILATERAL (MULTI): Status: RESOLVED | Noted: 2018-10-26 | Resolved: 2024-08-28

## 2024-08-28 PROBLEM — N18.9 CHRONIC KIDNEY DISEASE (CKD): Chronic | Status: ACTIVE | Noted: 2023-12-03

## 2024-08-28 PROBLEM — R79.89 LOW VITAMIN D LEVEL: Status: RESOLVED | Noted: 2023-12-03 | Resolved: 2024-08-28

## 2024-08-28 PROBLEM — H25.13 SENILE NUCLEAR CATARACT, BILATERAL: Status: RESOLVED | Noted: 2018-10-26 | Resolved: 2024-08-28

## 2024-08-28 PROBLEM — R73.9 HYPERGLYCEMIA: Status: RESOLVED | Noted: 2023-12-03 | Resolved: 2024-08-28

## 2024-08-28 PROBLEM — H35.363 DEGENERATIVE DRUSEN OF BOTH EYES: Status: RESOLVED | Noted: 2018-10-26 | Resolved: 2024-08-28

## 2024-08-28 PROBLEM — R31.9 HEMATURIA: Status: RESOLVED | Noted: 2023-12-03 | Resolved: 2024-08-28

## 2024-08-28 PROCEDURE — 3078F DIAST BP <80 MM HG: CPT | Performed by: INTERNAL MEDICINE

## 2024-08-28 PROCEDURE — 99214 OFFICE O/P EST MOD 30 MIN: CPT | Performed by: INTERNAL MEDICINE

## 2024-08-28 PROCEDURE — 1159F MED LIST DOCD IN RCRD: CPT | Performed by: INTERNAL MEDICINE

## 2024-08-28 PROCEDURE — 3075F SYST BP GE 130 - 139MM HG: CPT | Performed by: INTERNAL MEDICINE

## 2024-08-28 PROCEDURE — 1036F TOBACCO NON-USER: CPT | Performed by: INTERNAL MEDICINE

## 2024-08-28 PROCEDURE — 1160F RVW MEDS BY RX/DR IN RCRD: CPT | Performed by: INTERNAL MEDICINE

## 2024-08-28 RX ORDER — LANSOPRAZOLE 30 MG/1
CAPSULE, DELAYED RELEASE ORAL
COMMUNITY
Start: 2024-07-31

## 2024-08-28 RX ORDER — LISINOPRIL 20 MG/1
20 TABLET ORAL 2 TIMES DAILY
Qty: 60 TABLET | Refills: 3 | Status: SHIPPED | OUTPATIENT
Start: 2024-08-28 | End: 2025-08-28

## 2024-08-28 NOTE — PATIENT INSTRUCTIONS
1. Perm Afib. Chads Vasc is 4.No further bleeding. Con't of Eliquis. Off ASA. Con't with AC with DOAC.     2.HTN-BP well controlled. Con't cardura 1 bid, lisinopril 40 BID, amlodipine 10mg/day, and lasix 40mg/day.     3. Lipids- followed by Dr. Dupree. Baseline LDL 88. Unable to tolerate statin.  She has tried 3 different statins(simva, rosuva, atorva)  She was given Pitavastin by Dr. Dupree. But she is terrified to take it. We discussed risks/benefits of statins at length. She is thinking about taking it I've suggested taking 1 pill every other day.      4. 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 Seen by Pulm. Recent breathing test was fine.     5. DCHF-BNP > 300.  She will continue with Lasix 40 mg daily.  Minimal lower extremity edema.  I believe a lot of this is improved since she is been started on CPAP for her MARTA.     6. MARTA-doing great with CPAP.  Feels better.  Lower extremity edema is improved.    7. CAD- mild calcification noted on a CT scan 5/2024. Will try statins when she returns. She does have mild midsternal CP. Often reproducible with palp.  Regadenoson nuclear stress test 6/3/2024 normal perfusion ejection fraction 74%    RTC 6 months

## 2024-08-28 NOTE — PROGRESS NOTES
Referred by No ref. provider found    HPI   Here for 5 month follow up. Atypical CP reproducible with palp. No SOB. Mild LE edema. Better with CPAP and lasix  Past Medical History:  Problem List Items Addressed This Visit    None     Past Medical History:   Diagnosis Date    Aortic stenosis 12/03/2023    mild    Atrial fibrillation (Multi) 12/03/2023    Bradycardia 12/03/2023    Off BB    Chest tightness 05/23/2024    Coronary artery calcification 05/23/2024    Coronary calcification      Coronary artery disease involving native coronary artery of native heart without angina pectoris 05/23/2024    Essential (primary) hypertension     Benign hypertension    Hyperlipidemia 10/25/2023    Hypertension 12/03/2023    Localized edema 09/21/2022    Mitral valve regurgitation 12/03/2023    Mild-moderate    Obstructive sleep apnea syndrome 12/03/2023    CPAP    MARTA on CPAP 12/03/2023    Paroxysmal atrial fibrillation (Multi) 12/03/2023    PAF newly diagnosed in August 2018. HR controlled by itself. On Eliquis. [id number] score 4.      Past Surgical History:  She has a past surgical history that includes Hysterectomy.      Social History:  She reports that she quit smoking about 26 years ago. Her smoking use included cigarettes. She started smoking about 76 years ago. She has a 50 pack-year smoking history. She has been exposed to tobacco smoke. She has never used smokeless tobacco. No history on file for alcohol use and drug use.    Family History:  Family History   Problem Relation Name Age of Onset    Ovarian cancer Father's Sister       Allergies:  Ciprofloxacin, Ibuprofen, Penicillins, Statins-hmg-coa reductase inhibitors, Sulfa (sulfonamide antibiotics), Trimethoprim, and Zoster vaccine live (pf)    Outpatient Medications:  Current Outpatient Medications   Medication Instructions    acetaminophen (TYLENOL 8 HOUR) 650 mg, oral, 2 times daily    albuterol 90 mcg/actuation inhaler As needed    alendronate (Fosamax) 70 mg  tablet See Instructions, TAKE 1 TABLET ONCE A WEEK ON MONDAYS, 12 tabs, 84, Date: 07/12/2023 08:03:00 [facility] Mail Delivery, Instructions Replace Required Details, TAKE 1 TABLET ONCE A WEEK ON MONDAYS    amLODIPine (NORVASC) 10 mg, oral, Daily    cyanocobalamin (Vitamin B-12) 500 mcg tablet oral, Daily RT    docusate sodium (COLACE) 100 mg, oral, Every 12 hours PRN    doxazosin (CARDURA) 1 mg, oral, 2 times daily    Eliquis 5 mg, oral, 2 times daily    famotidine (PEPCID) 20 mg    flaxseed oiL 1,000 mg capsule 1 capsule, oral, Daily    folic acid (FOLVITE) 1 mg, oral, Daily    hydroxychloroquine (PLAQUENIL) 200 mg, oral, Every 12 hours    Lasix 40 mg, oral, Daily    levothyroxine (SYNTHROID, LEVOXYL) 25 mcg, oral, Daily    lisinopril 40 mg, oral, 2 times daily    loratadine (Claritin) 10 mg tablet 1 tablet, oral, As needed    methotrexate (TREXALL) 15 mg, oral, Weekly    multivitamin tablet oral    nystatin (Mycostatin) 100,000 unit/gram powder As needed    potassium chloride CR 20 mEq ER tablet 1 tablet, oral, Daily RT    sulfaSALAzine (AZULFIDINE) 1,000 mg, oral, 2 times daily (0900,1400), Do not crush, chew, or split.    vitamins A,C,E-zinc-copper 2,148 mcg-113 mg-45 mg-17.4mg tablet 2 times weekly     Last Recorded Vitals:  There were no vitals filed for this visit.  Physical Exam  Patient is alert and oriented x3.  HEENT is unremarkable mucous members are moist  Neck no JVP no bruits upstrokes are full no thyromegaly  Lungs are clear bilaterally.  No wheezing crackles or rales  Heart irregular rhythm normal S1-S2 there is no S3 no murmurs are heard.  Abdomen is soft bs are positive nontender nondistended no organomegaly no pulsatile masses  Extremities have no edema.  Distal pulses present palpable.  Neuro is grossly nonfocal  Skin has no rashes     Last Labs:  CBC -  Lab Results   Component Value Date    WBC 5.0 04/22/2024    HGB 12.5 04/22/2024    HCT 39.2 04/22/2024     (H) 04/22/2024      (L) 04/22/2024     CMP -  Lab Results   Component Value Date    CALCIUM 8.9 04/22/2024    PROT 7.0 04/22/2024    ALBUMIN 4.3 04/22/2024    AST 24 04/22/2024    ALT 21 04/22/2024    ALKPHOS 60 04/22/2024    BILITOT 0.9 04/22/2024     LIPID PANEL -   Lab Results   Component Value Date    CHOL 165 04/22/2024    HDL 52.9 04/22/2024    CHHDL 3.1 04/22/2024    VLDL 25 04/22/2024    TRIG 123 04/22/2024    NHDL 112 04/22/2024     RENAL FUNCTION PANEL -   Lab Results   Component Value Date    K 4.3 04/22/2024     Lab Results   Component Value Date     (H) 05/15/2023    HGBA1C 5.3 01/22/2024     Procedure    Regadenoson nuclear stress test 6/3/2024 normal.  Ejection fraction 74%    CT chest 5/16/24 cor calcification    VENOUS U/S [06/09/2023]: No ev/of DVT.      ECHO [02/03/2023]: EF 55-60%. Low normal RVSF. LA mod dial. RA mild-mod dial. Mild-mod MR. AV sclerosis.Echo 9/4/18 EF 60-65%     ECHO [09/21/2022]: EF 60%, Mild AS     CXR [05/15/2019]: No interval change from 3/1/2018.     PHARM NST [09/04/2018]: Normal - 70%     CXR (03/01/2018): Mild cardiomegaly prominence of central pulm vasculature.     CAROTID [11/27/2017]: Less than 50% stenosis prox MARY / LICA     ECHOÂ [11/08/2016]: EF 55-60%. Mod LVH. Mildly dial LA.          Assessment/Plan   1. Perm Afib. Chads Vasc is 4.No further bleeding. Con't of Eliquis. Off ASA. Con't with AC with DOAC.     2.HTN-BP well controlled. Con't cardura 1 bid, lisinopril 40 BID, amlodipine 10mg/day, and lasix 40mg/day.     3. Lipids- followed by Dr. Dupree. Baseline LDL 88. Unable to tolerate statin.  She has tried 3 different statins(simva, rosuva, atorva)  She was given Pitavastin by Dr. Dupree. But she is terrified to take it. We discussed risks/benefits of statins at length. She is thinking about taking it I've suggested taking 1 pill every other day.      4. 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 Seen by Pulm. Recent breathing test was fine.     5. DCHF-BNP >  300.  She will continue with Lasix 40 mg daily.  Minimal lower extremity edema.  I believe a lot of this is improved since she is been started on CPAP for her MARTA.     6. MARTA-doing great with CPAP.  Feels better.  Lower extremity edema is improved.    7. CAD- mild calcification noted on a CT scan 5/2024. Will try statins when she returns. She does have mild midsternal CP. Often reproducible with palp.  Regadenoson nuclear stress test 6/3/2024 normal perfusion ejection fraction 74%    RTC 6 months    Judy Ray MD     Instructions and follow up

## 2024-10-24 ENCOUNTER — LAB (OUTPATIENT)
Dept: LAB | Facility: LAB | Age: 84
End: 2024-10-24
Payer: MEDICARE

## 2024-10-24 DIAGNOSIS — E03.9 HYPOTHYROIDISM, UNSPECIFIED: Primary | ICD-10-CM

## 2024-10-24 DIAGNOSIS — N28.9 DISORDER OF KIDNEY AND URETER, UNSPECIFIED: ICD-10-CM

## 2024-10-24 DIAGNOSIS — E78.5 HYPERLIPIDEMIA, UNSPECIFIED: ICD-10-CM

## 2024-10-24 DIAGNOSIS — D64.9 ANEMIA, UNSPECIFIED: ICD-10-CM

## 2024-10-24 DIAGNOSIS — R79.89 OTHER SPECIFIED ABNORMAL FINDINGS OF BLOOD CHEMISTRY: ICD-10-CM

## 2024-10-24 LAB
25(OH)D3 SERPL-MCNC: 82 NG/ML (ref 30–100)
ALBUMIN SERPL BCP-MCNC: 4.5 G/DL (ref 3.4–5)
ALP SERPL-CCNC: 55 U/L (ref 33–136)
ALT SERPL W P-5'-P-CCNC: 16 U/L (ref 7–45)
ANION GAP SERPL CALC-SCNC: 12 MMOL/L (ref 10–20)
AST SERPL W P-5'-P-CCNC: 20 U/L (ref 9–39)
BASOPHILS # BLD AUTO: 0.02 X10*3/UL (ref 0–0.1)
BASOPHILS NFR BLD AUTO: 0.4 %
BILIRUB SERPL-MCNC: 0.8 MG/DL (ref 0–1.2)
BUN SERPL-MCNC: 28 MG/DL (ref 6–23)
CALCIUM SERPL-MCNC: 9.2 MG/DL (ref 8.6–10.3)
CHLORIDE SERPL-SCNC: 101 MMOL/L (ref 98–107)
CHOLEST SERPL-MCNC: 168 MG/DL (ref 0–199)
CHOLESTEROL/HDL RATIO: 3.6
CO2 SERPL-SCNC: 33 MMOL/L (ref 21–32)
CREAT SERPL-MCNC: 1.16 MG/DL (ref 0.5–1.05)
EGFRCR SERPLBLD CKD-EPI 2021: 47 ML/MIN/1.73M*2
EOSINOPHIL # BLD AUTO: 0.12 X10*3/UL (ref 0–0.4)
EOSINOPHIL NFR BLD AUTO: 2.7 %
ERYTHROCYTE [DISTWIDTH] IN BLOOD BY AUTOMATED COUNT: 14.7 % (ref 11.5–14.5)
GLUCOSE SERPL-MCNC: 99 MG/DL (ref 74–99)
HCT VFR BLD AUTO: 41.7 % (ref 36–46)
HDLC SERPL-MCNC: 46.7 MG/DL
HGB BLD-MCNC: 13.2 G/DL (ref 12–16)
IMM GRANULOCYTES # BLD AUTO: 0.02 X10*3/UL (ref 0–0.5)
IMM GRANULOCYTES NFR BLD AUTO: 0.4 % (ref 0–0.9)
LDLC SERPL CALC-MCNC: 95 MG/DL
LYMPHOCYTES # BLD AUTO: 0.97 X10*3/UL (ref 0.8–3)
LYMPHOCYTES NFR BLD AUTO: 21.7 %
MAGNESIUM SERPL-MCNC: 2.06 MG/DL (ref 1.6–2.4)
MCH RBC QN AUTO: 32.2 PG (ref 26–34)
MCHC RBC AUTO-ENTMCNC: 31.7 G/DL (ref 32–36)
MCV RBC AUTO: 102 FL (ref 80–100)
MONOCYTES # BLD AUTO: 0.36 X10*3/UL (ref 0.05–0.8)
MONOCYTES NFR BLD AUTO: 8 %
NEUTROPHILS # BLD AUTO: 2.99 X10*3/UL (ref 1.6–5.5)
NEUTROPHILS NFR BLD AUTO: 66.8 %
NON HDL CHOLESTEROL: 121 MG/DL (ref 0–149)
NRBC BLD-RTO: 0 /100 WBCS (ref 0–0)
PLATELET # BLD AUTO: 158 X10*3/UL (ref 150–450)
POTASSIUM SERPL-SCNC: 4.3 MMOL/L (ref 3.5–5.3)
PROT SERPL-MCNC: 7.1 G/DL (ref 6.4–8.2)
RBC # BLD AUTO: 4.1 X10*6/UL (ref 4–5.2)
SODIUM SERPL-SCNC: 142 MMOL/L (ref 136–145)
TRIGL SERPL-MCNC: 132 MG/DL (ref 0–149)
TSH SERPL-ACNC: 2.1 MIU/L (ref 0.44–3.98)
VLDL: 26 MG/DL (ref 0–40)
WBC # BLD AUTO: 4.5 X10*3/UL (ref 4.4–11.3)

## 2024-10-24 PROCEDURE — 36415 COLL VENOUS BLD VENIPUNCTURE: CPT

## 2024-10-24 PROCEDURE — 80061 LIPID PANEL: CPT

## 2024-10-24 PROCEDURE — 80053 COMPREHEN METABOLIC PANEL: CPT

## 2024-10-24 PROCEDURE — 82306 VITAMIN D 25 HYDROXY: CPT

## 2024-10-24 PROCEDURE — 83735 ASSAY OF MAGNESIUM: CPT

## 2024-10-24 PROCEDURE — 85025 COMPLETE CBC W/AUTO DIFF WBC: CPT

## 2024-10-24 PROCEDURE — 84443 ASSAY THYROID STIM HORMONE: CPT

## 2024-10-24 PROCEDURE — 83970 ASSAY OF PARATHORMONE: CPT

## 2024-10-25 LAB — PTH-INTACT SERPL-MCNC: 62.8 PG/ML (ref 18.5–88)

## 2024-12-17 DIAGNOSIS — I48.0 PAROXYSMAL ATRIAL FIBRILLATION (MULTI): Chronic | ICD-10-CM

## 2024-12-19 RX ORDER — APIXABAN 5 MG/1
5 TABLET, FILM COATED ORAL 2 TIMES DAILY
Qty: 60 TABLET | Refills: 11 | Status: SHIPPED | OUTPATIENT
Start: 2024-12-19

## 2024-12-20 ENCOUNTER — APPOINTMENT (OUTPATIENT)
Dept: RADIOLOGY | Facility: HOSPITAL | Age: 84
End: 2024-12-20
Payer: MEDICARE

## 2024-12-20 ENCOUNTER — HOSPITAL ENCOUNTER (EMERGENCY)
Facility: HOSPITAL | Age: 84
Discharge: HOME | End: 2024-12-20
Attending: EMERGENCY MEDICINE
Payer: MEDICARE

## 2024-12-20 VITALS
DIASTOLIC BLOOD PRESSURE: 68 MMHG | SYSTOLIC BLOOD PRESSURE: 156 MMHG | HEART RATE: 77 BPM | HEIGHT: 65 IN | WEIGHT: 240 LBS | TEMPERATURE: 97.9 F | OXYGEN SATURATION: 97 % | RESPIRATION RATE: 18 BRPM | BODY MASS INDEX: 39.99 KG/M2
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
## 2024-12-20 LAB
ALBUMIN SERPL BCP-MCNC: 4.6 G/DL (ref 3.4–5)
ALP SERPL-CCNC: 56 U/L (ref 33–136)
ALT SERPL W P-5'-P-CCNC: 17 U/L (ref 7–45)
ANION GAP SERPL CALC-SCNC: 12 MMOL/L (ref 10–20)
AST SERPL W P-5'-P-CCNC: 30 U/L (ref 9–39)
BASOPHILS # BLD AUTO: 0.03 X10*3/UL (ref 0–0.1)
BASOPHILS NFR BLD AUTO: 0.6 %
BILIRUB SERPL-MCNC: 0.7 MG/DL (ref 0–1.2)
BUN SERPL-MCNC: 32 MG/DL (ref 6–23)
CALCIUM SERPL-MCNC: 9.2 MG/DL (ref 8.6–10.3)
CHLORIDE SERPL-SCNC: 103 MMOL/L (ref 98–107)
CO2 SERPL-SCNC: 28 MMOL/L (ref 21–32)
CREAT SERPL-MCNC: 1.14 MG/DL (ref 0.5–1.05)
EGFRCR SERPLBLD CKD-EPI 2021: 48 ML/MIN/1.73M*2
EOSINOPHIL # BLD AUTO: 0.04 X10*3/UL (ref 0–0.4)
EOSINOPHIL NFR BLD AUTO: 0.8 %
ERYTHROCYTE [DISTWIDTH] IN BLOOD BY AUTOMATED COUNT: 14.8 % (ref 11.5–14.5)
GLUCOSE SERPL-MCNC: 158 MG/DL (ref 74–99)
HCT VFR BLD AUTO: 43.4 % (ref 36–46)
HGB BLD-MCNC: 14 G/DL (ref 12–16)
IMM GRANULOCYTES # BLD AUTO: 0.04 X10*3/UL (ref 0–0.5)
IMM GRANULOCYTES NFR BLD AUTO: 0.8 % (ref 0–0.9)
LYMPHOCYTES # BLD AUTO: 1.09 X10*3/UL (ref 0.8–3)
LYMPHOCYTES NFR BLD AUTO: 21.6 %
MCH RBC QN AUTO: 31.9 PG (ref 26–34)
MCHC RBC AUTO-ENTMCNC: 32.3 G/DL (ref 32–36)
MCV RBC AUTO: 99 FL (ref 80–100)
MONOCYTES # BLD AUTO: 0.33 X10*3/UL (ref 0.05–0.8)
MONOCYTES NFR BLD AUTO: 6.5 %
NEUTROPHILS # BLD AUTO: 3.51 X10*3/UL (ref 1.6–5.5)
NEUTROPHILS NFR BLD AUTO: 69.7 %
NRBC BLD-RTO: 0 /100 WBCS (ref 0–0)
PLATELET # BLD AUTO: 138 X10*3/UL (ref 150–450)
POTASSIUM SERPL-SCNC: 4.3 MMOL/L (ref 3.5–5.3)
PROT SERPL-MCNC: 7.1 G/DL (ref 6.4–8.2)
RBC # BLD AUTO: 4.39 X10*6/UL (ref 4–5.2)
SODIUM SERPL-SCNC: 139 MMOL/L (ref 136–145)
WBC # BLD AUTO: 5 X10*3/UL (ref 4.4–11.3)

## 2024-12-20 PROCEDURE — 70450 CT HEAD/BRAIN W/O DYE: CPT | Performed by: STUDENT IN AN ORGANIZED HEALTH CARE EDUCATION/TRAINING PROGRAM

## 2024-12-20 PROCEDURE — 99284 EMERGENCY DEPT VISIT MOD MDM: CPT | Mod: 25 | Performed by: EMERGENCY MEDICINE

## 2024-12-20 PROCEDURE — 72125 CT NECK SPINE W/O DYE: CPT | Performed by: STUDENT IN AN ORGANIZED HEALTH CARE EDUCATION/TRAINING PROGRAM

## 2024-12-20 PROCEDURE — 85025 COMPLETE CBC W/AUTO DIFF WBC: CPT | Performed by: EMERGENCY MEDICINE

## 2024-12-20 PROCEDURE — G0390 TRAUMA RESPONS W/HOSP CRITI: HCPCS

## 2024-12-20 PROCEDURE — 80053 COMPREHEN METABOLIC PANEL: CPT | Performed by: EMERGENCY MEDICINE

## 2024-12-20 PROCEDURE — 70450 CT HEAD/BRAIN W/O DYE: CPT

## 2024-12-20 PROCEDURE — 72125 CT NECK SPINE W/O DYE: CPT

## 2024-12-20 PROCEDURE — 36415 COLL VENOUS BLD VENIPUNCTURE: CPT | Performed by: EMERGENCY MEDICINE

## 2024-12-20 RX ORDER — LISINOPRIL 40 MG/1
40 TABLET ORAL 2 TIMES DAILY
COMMUNITY

## 2024-12-20 RX ORDER — SULFASALAZINE 500 MG/1
1000 TABLET ORAL 2 TIMES DAILY
COMMUNITY

## 2024-12-20 ASSESSMENT — PAIN - FUNCTIONAL ASSESSMENT: PAIN_FUNCTIONAL_ASSESSMENT: 0-10

## 2024-12-20 ASSESSMENT — PAIN SCALES - GENERAL: PAINLEVEL_OUTOF10: 5 - MODERATE PAIN

## 2024-12-20 ASSESSMENT — COLUMBIA-SUICIDE SEVERITY RATING SCALE - C-SSRS
6. HAVE YOU EVER DONE ANYTHING, STARTED TO DO ANYTHING, OR PREPARED TO DO ANYTHING TO END YOUR LIFE?: NO
1. IN THE PAST MONTH, HAVE YOU WISHED YOU WERE DEAD OR WISHED YOU COULD GO TO SLEEP AND NOT WAKE UP?: NO
2. HAVE YOU ACTUALLY HAD ANY THOUGHTS OF KILLING YOURSELF?: NO

## 2024-12-20 NOTE — ED PROVIDER NOTES
HPI   No chief complaint on file.      Chief complaint: Fall    History of present illness: Patient is a 84-year-old female currently on Eliquis therapy for A-fib presenting to the emergency department with complaints of a fall.  According to the patient, just prior to arrival in the emergency department she was sitting on the bed when she lost her balance and fell to the ground.  The patient states that she struck the left side of her head she struck her left elbow.  Patient states that she has not had a tetanus vaccination vaccination in the past 5 years.  Patient denies any loss of consciousness.  She states that she has whole body pain however this is standard for her as she has a history of rheumatoid arthritis.  EMS was called and the patient was brought to the emergency department for further evaluation she has no other complaints at this time.          History provided by:  Patient   used: No            Patient History   Past Medical History:   Diagnosis Date    Aortic stenosis 12/03/2023    mild    Atrial fibrillation (Multi) 12/03/2023    Bradycardia 12/03/2023    Off BB    Chest tightness 05/23/2024    Chronic kidney disease (CKD) 12/03/2023    Coronary artery calcification 05/23/2024    Coronary calcification      Coronary artery disease involving native coronary artery of native heart without angina pectoris 05/23/2024    Essential (primary) hypertension     Benign hypertension    Hyperlipidemia 10/25/2023    Hypertension 12/03/2023    Localized edema 09/21/2022    Mitral valve regurgitation 12/03/2023    Mild-moderate    Obstructive sleep apnea syndrome 12/03/2023    CPAP    MARTA on CPAP 12/03/2023    Paroxysmal atrial fibrillation (Multi) 12/03/2023    PAF newly diagnosed in August 2018. HR controlled by itself. On Eliquis. [id number] score 4.     Past Surgical History:   Procedure Laterality Date    HYSTERECTOMY       Family History   Problem Relation Name Age of Onset    Ovarian  cancer Father's Sister       Social History     Tobacco Use    Smoking status: Former     Current packs/day: 0.00     Average packs/day: 1 pack/day for 50.0 years (50.0 ttl pk-yrs)     Types: Cigarettes     Start date: 1948     Quit date: 1998     Years since quittin.9     Passive exposure: Past    Smokeless tobacco: Never   Substance Use Topics    Alcohol use: Not on file    Drug use: Not on file       Physical Exam   ED Triage Vitals   Temp Pulse Resp BP   -- -- -- --      SpO2 Temp src Heart Rate Source Patient Position   -- -- -- --      BP Location FiO2 (%)     -- --       Physical Exam  Constitutional:       Appearance: Normal appearance.   HENT:      Head: Normocephalic and atraumatic.      Right Ear: External ear normal.      Left Ear: External ear normal.      Nose: Nose normal.      Mouth/Throat:      Mouth: Mucous membranes are moist.   Eyes:      General: Lids are normal.      Extraocular Movements: Extraocular movements intact.      Pupils: Pupils are equal, round, and reactive to light.   Cardiovascular:      Rate and Rhythm: Normal rate and regular rhythm.      Heart sounds: Normal heart sounds.   Pulmonary:      Effort: Pulmonary effort is normal.      Breath sounds: Normal breath sounds.   Abdominal:      General: Abdomen is flat.      Palpations: Abdomen is soft.      Tenderness: There is no abdominal tenderness. There is no guarding or rebound.   Musculoskeletal:         General: No deformity. Normal range of motion.      Cervical back: Normal range of motion and neck supple.   Skin:     General: Skin is warm.      Capillary Refill: Capillary refill takes less than 2 seconds.      Coloration: Skin is not jaundiced.      Comments: Patient has an abrasion located on her left elbow.  There is no active bleeding.  Patient has full range of motion.   Neurological:      General: No focal deficit present.      Mental Status: She is alert and oriented to person, place, and time.    Psychiatric:         Mood and Affect: Mood normal.         Behavior: Behavior normal.           ED Course & MDM   Diagnoses as of 12/21/24 0002   Fall, initial encounter                 No data recorded                                 Medical Decision Making  Patient remained stable during her time in the emergency department.  CBC demonstrated no significant abnormalities Chem-7 and LFTs were all within normal is CAT scan of the patient's head as well as cervical spine demonstrated no significant abnormalities.  The patient was reassured    On arrival to the emergency department, the patient was hypertensive however she took her home antihypertensive medications and had improvement of her blood pressure the patient was reassured that there is no significant pathology at this time she was instructed to follow-up with a primary care physician.  Patient expressed understanding and agreement the patient was then discharged home in otherwise stable condition.    Amount and/or Complexity of Data Reviewed  Labs: ordered.  Radiology: ordered.        Procedure  Procedures     Jovan Hernandez MD  12/21/24 0003     walking/standing

## 2024-12-20 NOTE — ED TRIAGE NOTES
Pt arrives to ED via Ems from home. Pt reeportedly fell from sitting and hit her head and left elbow. Pt sts she had a hard time getting up but that she needsz bilateral knee and hip replacement. Pt denies LOC and denies headache.  Pt sts she takes eliquis. Pt is A&Ox4 and uses walker to walk    Pt declines tetanus shot although she has an abrasion on left elbow.

## 2025-01-09 DIAGNOSIS — I10 HYPERTENSION, UNSPECIFIED TYPE: Chronic | ICD-10-CM

## 2025-01-10 RX ORDER — AMLODIPINE BESYLATE 10 MG/1
10 TABLET ORAL DAILY
Qty: 90 TABLET | Refills: 3 | Status: SHIPPED | OUTPATIENT
Start: 2025-01-10

## 2025-02-12 PROBLEM — E66.812 CLASS 2 OBESITY WITH BODY MASS INDEX (BMI) OF 39.0 TO 39.9 IN ADULT: Status: ACTIVE | Noted: 2023-10-25

## 2025-03-06 ENCOUNTER — OFFICE VISIT (OUTPATIENT)
Dept: CARDIOLOGY | Facility: CLINIC | Age: 85
End: 2025-03-06
Payer: MEDICARE

## 2025-03-06 VITALS
SYSTOLIC BLOOD PRESSURE: 132 MMHG | HEART RATE: 75 BPM | DIASTOLIC BLOOD PRESSURE: 82 MMHG | WEIGHT: 241 LBS | BODY MASS INDEX: 40.1 KG/M2 | OXYGEN SATURATION: 92 %

## 2025-03-06 DIAGNOSIS — I25.10 CORONARY ARTERY DISEASE INVOLVING NATIVE CORONARY ARTERY OF NATIVE HEART WITHOUT ANGINA PECTORIS: ICD-10-CM

## 2025-03-06 DIAGNOSIS — I48.0 PAROXYSMAL ATRIAL FIBRILLATION (MULTI): Chronic | ICD-10-CM

## 2025-03-06 DIAGNOSIS — I50.32 CHRONIC HEART FAILURE WITH PRESERVED EJECTION FRACTION: Chronic | ICD-10-CM

## 2025-03-06 DIAGNOSIS — E78.2 MIXED HYPERLIPIDEMIA: Chronic | ICD-10-CM

## 2025-03-06 DIAGNOSIS — I10 PRIMARY HYPERTENSION: Chronic | ICD-10-CM

## 2025-03-06 DIAGNOSIS — R07.89 CHEST TIGHTNESS: Primary | ICD-10-CM

## 2025-03-06 LAB
Q ONSET: 222 MS
QRS COUNT: 10 BEATS
QRS DURATION: 84 MS
QT INTERVAL: 432 MS
QTC CALCULATION(BAZETT): 434 MS
QTC FREDERICIA: 434 MS
R AXIS: 108 DEGREES
T AXIS: 55 DEGREES
T OFFSET: 438 MS
VENTRICULAR RATE: 61 BPM

## 2025-03-06 PROCEDURE — 1160F RVW MEDS BY RX/DR IN RCRD: CPT | Performed by: INTERNAL MEDICINE

## 2025-03-06 PROCEDURE — 93005 ELECTROCARDIOGRAM TRACING: CPT | Performed by: INTERNAL MEDICINE

## 2025-03-06 PROCEDURE — 99214 OFFICE O/P EST MOD 30 MIN: CPT | Performed by: INTERNAL MEDICINE

## 2025-03-06 PROCEDURE — 3075F SYST BP GE 130 - 139MM HG: CPT | Performed by: INTERNAL MEDICINE

## 2025-03-06 PROCEDURE — 1159F MED LIST DOCD IN RCRD: CPT | Performed by: INTERNAL MEDICINE

## 2025-03-06 PROCEDURE — 1036F TOBACCO NON-USER: CPT | Performed by: INTERNAL MEDICINE

## 2025-03-06 PROCEDURE — 3079F DIAST BP 80-89 MM HG: CPT | Performed by: INTERNAL MEDICINE

## 2025-03-06 RX ORDER — ROSUVASTATIN CALCIUM 10 MG/1
10 TABLET, COATED ORAL DAILY
Qty: 30 TABLET | Refills: 11 | Status: SHIPPED | OUTPATIENT
Start: 2025-03-06 | End: 2026-03-06

## 2025-03-06 NOTE — PATIENT INSTRUCTIONS
1. Perm Afib. Chads Vasc is 4.No further bleeding. Con't of Eliquis. Off ASA. Con't with AC with DOAC.     2.HTN-BP well controlled. Con't cardura 1 bid, lisinopril 40 BID, amlodipine 10mg/day, and lasix 40mg/day.     3. Lipids- In the past unable to tolerate 3 statin. Now willing to try rosuva 10 mg every other day.  10/24/2024 LDL 95 HDL 47 triglycerides 132 LFTs normal. She'll follow up with Dr. Dupree in 3 months for FBW.      4. 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 Seen by Pulm. Recent breathing test was fine.     5. DCHF-BNP > 300.  She will continue with Lasix 40 mg daily.  Minimal lower extremity edema.  I believe a lot of this is improved since she is been started on CPAP for her MARTA.     6. MARTA-doing great with CPAP.  Feels better.  Lower extremity edema is improved.    7. CAD- mild calcification noted on a CT scan 5/2024. Will try statins when she returns. She does have mild midsternal CP. Often reproducible with palp.  Regadenoson nuclear stress test 6/3/2024 normal perfusion ejection fraction 74%    RTC 6 months. EKG today. Start Rosuva 10 every other day. FBW with DR. Dupree.

## 2025-03-06 NOTE — PROGRESS NOTES
Referred by Cory Ch is here for follow up. She had a fall in 12/2024. CT head revealed a prior CVA.   Past Medical History:  Problem List Items Addressed This Visit    None     Past Medical History:   Diagnosis Date    Aortic stenosis 12/03/2023    mild    Atrial fibrillation (Multi) 12/03/2023    Bradycardia 12/03/2023    Off BB    Chest tightness 05/23/2024    Chronic kidney disease (CKD) 12/03/2023    Coronary artery calcification 05/23/2024    Coronary calcification      Coronary artery disease involving native coronary artery of native heart without angina pectoris 05/23/2024    Essential (primary) hypertension     Benign hypertension    Hyperlipidemia 10/25/2023    Hypertension 12/03/2023    Localized edema 09/21/2022    Mitral valve regurgitation 12/03/2023    Mild-moderate    Obstructive sleep apnea syndrome 12/03/2023    CPAP    MARTA on CPAP 12/03/2023    Paroxysmal atrial fibrillation (Multi) 12/03/2023    PAF newly diagnosed in August 2018. HR controlled by itself. On Eliquis. [id number] score 4.      Past Surgical History:  She has a past surgical history that includes Hysterectomy.      Social History:  She reports that she quit smoking about 27 years ago. Her smoking use included cigarettes. She started smoking about 77 years ago. She has a 50 pack-year smoking history. She has been exposed to tobacco smoke. She has never used smokeless tobacco. No history on file for alcohol use and drug use.    Family History:  Family History   Problem Relation Name Age of Onset    Ovarian cancer Father's Sister       Allergies:  Ciprofloxacin, Ibuprofen, Penicillins, Statins-hmg-coa reductase inhibitors, Sulfa (sulfonamide antibiotics), Trimethoprim, Zoster vaccine live (pf), and Bactrim [sulfamethoxazole-trimethoprim]    Outpatient Medications:  Current Outpatient Medications   Medication Instructions    acetaminophen (TYLENOL 8 HOUR) 650 mg, oral, 2 times daily    albuterol 90 mcg/actuation  inhaler Inhale 2 puffs every 6 hours if needed.    alendronate (Fosamax) 70 mg tablet Take 1 tablet (70 mg) by mouth 1 (one) time per week.    amLODIPine (NORVASC) 10 mg, oral, Daily    cyanocobalamin (Vitamin B-12) 500 mcg tablet oral, Daily RT    docusate sodium (COLACE) 100 mg, oral, Every 12 hours PRN    doxazosin (CARDURA) 1 mg, oral, 2 times daily    Eliquis 5 mg, oral, 2 times daily    flaxseed oiL 1,000 mg capsule 1 capsule, oral, Daily    folic acid (FOLVITE) 1 mg, oral, Daily    hydroxychloroquine (PLAQUENIL) 200 mg, oral, Every 12 hours    lansoprazole (Prevacid) 30 mg DR capsule     Lasix 40 mg, oral, Daily    levothyroxine (SYNTHROID, LEVOXYL) 25 mcg, oral, Daily    lisinopril 20 mg, oral, 2 times daily    lisinopril 40 mg, 2 times daily    loratadine (Claritin) 10 mg tablet 1 tablet, oral, As needed    methotrexate (TREXALL) 15 mg, oral, Weekly    multivitamin tablet 1 tablet, oral, Daily    nystatin (Mycostatin) 100,000 unit/gram powder Apply 1 Application topically if needed.    potassium chloride CR 20 mEq ER tablet 1 tablet, oral, Daily    sulfaSALAzine (AZULFIDINE) 1,000 mg, 2 times daily    vitamins A,C,E-zinc-copper 2,148 mcg-113 mg-45 mg-17.4mg tablet 1 tablet, oral, 2 times daily     Last Recorded Vitals:  There were no vitals filed for this visit.  Physical Exam  Patient is alert and oriented x3.  HEENT is unremarkable mucous members are moist  Neck no JVP no bruits upstrokes are full no thyromegaly  Lungs are clear bilaterally.  No wheezing crackles or rales  Heart irregular rhythm normal S1-S2 there is no S3 no murmurs are heard.  Abdomen is soft bs are positive nontender nondistended no organomegaly no pulsatile masses  Extremities have no edema.  Distal pulses present palpable.  Neuro is grossly nonfocal  Skin has no rashes     Last Labs:  CBC -  Lab Results   Component Value Date    WBC 5.0 12/20/2024    HGB 14.0 12/20/2024    HCT 43.4 12/20/2024    MCV 99 12/20/2024     (L)  12/20/2024     CMP -  Lab Results   Component Value Date    CALCIUM 9.2 12/20/2024    PROT 7.1 12/20/2024    ALBUMIN 4.6 12/20/2024    AST 30 12/20/2024    ALT 17 12/20/2024    ALKPHOS 56 12/20/2024    BILITOT 0.7 12/20/2024     LIPID PANEL -   Lab Results   Component Value Date    CHOL 168 10/24/2024    HDL 46.7 10/24/2024    CHHDL 3.6 10/24/2024    VLDL 26 10/24/2024    TRIG 132 10/24/2024    NHDL 121 10/24/2024     RENAL FUNCTION PANEL -   Lab Results   Component Value Date    K 4.3 12/20/2024     Lab Results   Component Value Date     (H) 05/15/2023    HGBA1C 5.3 01/22/2024     Procedure    Regadenoson nuclear stress test 6/3/2024 normal.  Ejection fraction 74%    CT chest 5/16/24 cor calcification    VENOUS U/S [06/09/2023]: No ev/of DVT.      ECHO [02/03/2023]: EF 55-60%. Low normal RVSF. LA mod dial. RA mild-mod dial. Mild-mod MR. AV sclerosis.Echo 9/4/18 EF 60-65%     ECHO [09/21/2022]: EF 60%, Mild AS     CXR [05/15/2019]: No interval change from 3/1/2018.     PHARM NST [09/04/2018]: Normal - 70%     CXR (03/01/2018): Mild cardiomegaly prominence of central pulm vasculature.     CAROTID [11/27/2017]: Less than 50% stenosis prox MARY / LICA     ECHOÂ [11/08/2016]: EF 55-60%. Mod LVH. Mildly dial LA.          Assessment/Plan   1. Perm Afib. Chads Vasc is 4.No further bleeding. Con't of Eliquis. Off ASA. Con't with AC with DOAC.     2.HTN-BP well controlled. Con't cardura 1 bid, lisinopril 40 BID, amlodipine 10mg/day, and lasix 40mg/day.     3. Lipids- In the past unable to tolerate 3 statin. Now willing to try rosuva 10 mg every other day.  10/24/2024 LDL 95 HDL 47 triglycerides 132 LFTs normal. She'll follow up with Dr. Dupree in 3 months for FBW.      4. 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 Seen by Pulm. Recent breathing test was fine.     5. DCHF-BNP > 300.  She will continue with Lasix 40 mg daily.  Minimal lower extremity edema.  I believe a lot of this is improved since she is been started  on CPAP for her MARTA.     6. MARTA-doing great with CPAP.  Feels better.  Lower extremity edema is improved.    7. CAD- mild calcification noted on a CT scan 5/2024. Will try statins when she returns. She does have mild midsternal CP. Often reproducible with palp.  Regadenoson nuclear stress test 6/3/2024 normal perfusion ejection fraction 74%    RTC 6 months. EKG today. Start Rosuva 10 every other day. FBW with DR. Dupree.    Judy Ray MD     Instructions and follow up

## 2025-03-11 ENCOUNTER — HOSPITAL ENCOUNTER (OUTPATIENT)
Dept: VASCULAR MEDICINE | Facility: HOSPITAL | Age: 85
Discharge: HOME | End: 2025-03-11
Payer: MEDICARE

## 2025-03-11 DIAGNOSIS — Z86.73 PERSONAL HISTORY OF TRANSIENT ISCHEMIC ATTACK (TIA), AND CEREBRAL INFARCTION WITHOUT RESIDUAL DEFICITS: ICD-10-CM

## 2025-03-11 DIAGNOSIS — G45.9 TRANSIENT CEREBRAL ISCHEMIC ATTACK, UNSPECIFIED: ICD-10-CM

## 2025-03-11 PROCEDURE — 93880 EXTRACRANIAL BILAT STUDY: CPT

## 2025-03-11 PROCEDURE — 93880 EXTRACRANIAL BILAT STUDY: CPT | Performed by: SURGERY

## 2025-03-20 DIAGNOSIS — E78.2 MIXED HYPERLIPIDEMIA: ICD-10-CM

## 2025-03-21 RX ORDER — DOXAZOSIN 1 MG/1
1 TABLET ORAL 2 TIMES DAILY
Qty: 180 TABLET | Refills: 3 | Status: SHIPPED | OUTPATIENT
Start: 2025-03-21

## 2025-04-21 ENCOUNTER — HOSPITAL ENCOUNTER (OUTPATIENT)
Facility: HOSPITAL | Age: 85
Setting detail: OBSERVATION
Discharge: NURSING, REHAB OR LONG-TERM CARE | End: 2025-04-21
Attending: EMERGENCY MEDICINE | Admitting: INTERNAL MEDICINE
Payer: MEDICARE

## 2025-04-21 ENCOUNTER — APPOINTMENT (OUTPATIENT)
Dept: RADIOLOGY | Facility: HOSPITAL | Age: 85
End: 2025-04-21
Payer: MEDICARE

## 2025-04-21 DIAGNOSIS — M54.50 ACUTE MIDLINE LOW BACK PAIN WITHOUT SCIATICA: ICD-10-CM

## 2025-04-21 DIAGNOSIS — R33.9 URINARY RETENTION: ICD-10-CM

## 2025-04-21 DIAGNOSIS — S39.012A LUMBAR STRAIN, INITIAL ENCOUNTER: Primary | ICD-10-CM

## 2025-04-21 DIAGNOSIS — M54.50 ACUTE LEFT-SIDED LOW BACK PAIN WITHOUT SCIATICA: ICD-10-CM

## 2025-04-21 LAB
ALBUMIN SERPL BCP-MCNC: 4.8 G/DL (ref 3.4–5)
ALP SERPL-CCNC: 65 U/L (ref 33–136)
ALT SERPL W P-5'-P-CCNC: 15 U/L (ref 7–45)
ANION GAP SERPL CALC-SCNC: 16 MMOL/L (ref 10–20)
AST SERPL W P-5'-P-CCNC: 57 U/L (ref 9–39)
BASOPHILS # BLD AUTO: 0.02 X10*3/UL (ref 0–0.1)
BASOPHILS NFR BLD AUTO: 0.4 %
BILIRUB SERPL-MCNC: 0.7 MG/DL (ref 0–1.2)
BUN SERPL-MCNC: 33 MG/DL (ref 6–23)
CALCIUM SERPL-MCNC: 9.2 MG/DL (ref 8.6–10.3)
CHLORIDE SERPL-SCNC: 104 MMOL/L (ref 98–107)
CO2 SERPL-SCNC: 23 MMOL/L (ref 21–32)
CREAT SERPL-MCNC: 1.16 MG/DL (ref 0.5–1.05)
EGFRCR SERPLBLD CKD-EPI 2021: 47 ML/MIN/1.73M*2
EOSINOPHIL # BLD AUTO: 0.05 X10*3/UL (ref 0–0.4)
EOSINOPHIL NFR BLD AUTO: 1 %
ERYTHROCYTE [DISTWIDTH] IN BLOOD BY AUTOMATED COUNT: 15.5 % (ref 11.5–14.5)
GLUCOSE SERPL-MCNC: 117 MG/DL (ref 74–99)
HCT VFR BLD AUTO: 42.7 % (ref 36–46)
HGB BLD-MCNC: 12.9 G/DL (ref 12–16)
IMM GRANULOCYTES # BLD AUTO: 0.02 X10*3/UL (ref 0–0.5)
IMM GRANULOCYTES NFR BLD AUTO: 0.4 % (ref 0–0.9)
LYMPHOCYTES # BLD AUTO: 0.68 X10*3/UL (ref 0.8–3)
LYMPHOCYTES NFR BLD AUTO: 13.1 %
MCH RBC QN AUTO: 30.6 PG (ref 26–34)
MCHC RBC AUTO-ENTMCNC: 30.2 G/DL (ref 32–36)
MCV RBC AUTO: 101 FL (ref 80–100)
MONOCYTES # BLD AUTO: 0.43 X10*3/UL (ref 0.05–0.8)
MONOCYTES NFR BLD AUTO: 8.3 %
NEUTROPHILS # BLD AUTO: 4.01 X10*3/UL (ref 1.6–5.5)
NEUTROPHILS NFR BLD AUTO: 76.8 %
NRBC BLD-RTO: 0 /100 WBCS (ref 0–0)
PLATELET # BLD AUTO: 113 X10*3/UL (ref 150–450)
POTASSIUM SERPL-SCNC: 4.7 MMOL/L (ref 3.5–5.3)
POTASSIUM SERPL-SCNC: 7.3 MMOL/L (ref 3.5–5.3)
PROT SERPL-MCNC: 7.8 G/DL (ref 6.4–8.2)
RBC # BLD AUTO: 4.22 X10*6/UL (ref 4–5.2)
SODIUM SERPL-SCNC: 136 MMOL/L (ref 136–145)
WBC # BLD AUTO: 5.2 X10*3/UL (ref 4.4–11.3)

## 2025-04-21 PROCEDURE — 85025 COMPLETE CBC W/AUTO DIFF WBC: CPT | Performed by: EMERGENCY MEDICINE

## 2025-04-21 PROCEDURE — 2500000001 HC RX 250 WO HCPCS SELF ADMINISTERED DRUGS (ALT 637 FOR MEDICARE OP): Performed by: INTERNAL MEDICINE

## 2025-04-21 PROCEDURE — 36415 COLL VENOUS BLD VENIPUNCTURE: CPT | Performed by: EMERGENCY MEDICINE

## 2025-04-21 PROCEDURE — G0378 HOSPITAL OBSERVATION PER HR: HCPCS

## 2025-04-21 PROCEDURE — 99285 EMERGENCY DEPT VISIT HI MDM: CPT | Mod: 25 | Performed by: EMERGENCY MEDICINE

## 2025-04-21 PROCEDURE — 99222 1ST HOSP IP/OBS MODERATE 55: CPT

## 2025-04-21 PROCEDURE — 72131 CT LUMBAR SPINE W/O DYE: CPT

## 2025-04-21 PROCEDURE — 2500000004 HC RX 250 GENERAL PHARMACY W/ HCPCS (ALT 636 FOR OP/ED): Mod: JZ | Performed by: EMERGENCY MEDICINE

## 2025-04-21 PROCEDURE — 84132 ASSAY OF SERUM POTASSIUM: CPT | Mod: 59 | Performed by: EMERGENCY MEDICINE

## 2025-04-21 PROCEDURE — 96374 THER/PROPH/DIAG INJ IV PUSH: CPT

## 2025-04-21 PROCEDURE — 2500000004 HC RX 250 GENERAL PHARMACY W/ HCPCS (ALT 636 FOR OP/ED)

## 2025-04-21 PROCEDURE — 80053 COMPREHEN METABOLIC PANEL: CPT | Performed by: EMERGENCY MEDICINE

## 2025-04-21 PROCEDURE — 72131 CT LUMBAR SPINE W/O DYE: CPT | Performed by: RADIOLOGY

## 2025-04-21 PROCEDURE — 96372 THER/PROPH/DIAG INJ SC/IM: CPT | Mod: 59 | Performed by: EMERGENCY MEDICINE

## 2025-04-21 PROCEDURE — 2500000001 HC RX 250 WO HCPCS SELF ADMINISTERED DRUGS (ALT 637 FOR MEDICARE OP)

## 2025-04-21 PROCEDURE — 2500000005 HC RX 250 GENERAL PHARMACY W/O HCPCS: Performed by: EMERGENCY MEDICINE

## 2025-04-21 RX ORDER — ACETAMINOPHEN 650 MG/1
650 SUPPOSITORY RECTAL EVERY 4 HOURS PRN
Status: DISCONTINUED | OUTPATIENT
Start: 2025-04-21 | End: 2025-05-01 | Stop reason: HOSPADM

## 2025-04-21 RX ORDER — CYCLOBENZAPRINE HCL 10 MG
5 TABLET ORAL 3 TIMES DAILY PRN
Status: DISCONTINUED | OUTPATIENT
Start: 2025-04-21 | End: 2025-04-30

## 2025-04-21 RX ORDER — ORPHENADRINE CITRATE 30 MG/ML
60 INJECTION INTRAMUSCULAR; INTRAVENOUS ONCE
Status: COMPLETED | OUTPATIENT
Start: 2025-04-21 | End: 2025-04-21

## 2025-04-21 RX ORDER — LEVOTHYROXINE SODIUM 25 UG/1
25 TABLET ORAL NIGHTLY
Status: DISCONTINUED | OUTPATIENT
Start: 2025-04-21 | End: 2025-05-01 | Stop reason: HOSPADM

## 2025-04-21 RX ORDER — METHYLPREDNISOLONE 4 MG/1
20 TABLET ORAL ONCE
Status: COMPLETED | OUTPATIENT
Start: 2025-04-22 | End: 2025-04-22

## 2025-04-21 RX ORDER — ACETAMINOPHEN 160 MG/5ML
650 SOLUTION ORAL EVERY 4 HOURS PRN
Status: DISCONTINUED | OUTPATIENT
Start: 2025-04-21 | End: 2025-05-01 | Stop reason: HOSPADM

## 2025-04-21 RX ORDER — VIT C/E/ZN/COPPR/LUTEIN/ZEAXAN 250MG-90MG
25 CAPSULE ORAL NIGHTLY
COMMUNITY

## 2025-04-21 RX ORDER — ACETAMINOPHEN 325 MG/1
650 TABLET ORAL EVERY 4 HOURS PRN
Status: DISCONTINUED | OUTPATIENT
Start: 2025-04-21 | End: 2025-05-01 | Stop reason: HOSPADM

## 2025-04-21 RX ORDER — FUROSEMIDE 40 MG/1
40 TABLET ORAL DAILY
Status: DISCONTINUED | OUTPATIENT
Start: 2025-04-22 | End: 2025-05-01 | Stop reason: HOSPADM

## 2025-04-21 RX ORDER — DOXAZOSIN 2 MG/1
1 TABLET ORAL 2 TIMES DAILY
Status: DISCONTINUED | OUTPATIENT
Start: 2025-04-21 | End: 2025-05-01 | Stop reason: HOSPADM

## 2025-04-21 RX ORDER — METHYLPREDNISOLONE 4 MG/1
8 TABLET ORAL ONCE
Status: COMPLETED | OUTPATIENT
Start: 2025-04-25 | End: 2025-04-25

## 2025-04-21 RX ORDER — CALCIUM CARBONATE 400(1000)
400 TABLET,CHEWABLE ORAL
COMMUNITY

## 2025-04-21 RX ORDER — OXYCODONE HYDROCHLORIDE 5 MG/1
5 TABLET ORAL EVERY 6 HOURS PRN
Refills: 0 | Status: DISCONTINUED | OUTPATIENT
Start: 2025-04-21 | End: 2025-04-30

## 2025-04-21 RX ORDER — LIDOCAINE 560 MG/1
1 PATCH PERCUTANEOUS; TOPICAL; TRANSDERMAL DAILY
Status: DISCONTINUED | OUTPATIENT
Start: 2025-04-21 | End: 2025-05-01 | Stop reason: HOSPADM

## 2025-04-21 RX ORDER — ALBUTEROL SULFATE 90 UG/1
2 INHALANT RESPIRATORY (INHALATION) EVERY 6 HOURS PRN
Status: DISCONTINUED | OUTPATIENT
Start: 2025-04-21 | End: 2025-04-21

## 2025-04-21 RX ORDER — METHYLPREDNISOLONE 4 MG/1
12 TABLET ORAL ONCE
Status: COMPLETED | OUTPATIENT
Start: 2025-04-24 | End: 2025-04-24

## 2025-04-21 RX ORDER — ROSUVASTATIN CALCIUM 10 MG/1
10 TABLET, COATED ORAL DAILY
Status: DISCONTINUED | OUTPATIENT
Start: 2025-04-22 | End: 2025-04-27

## 2025-04-21 RX ORDER — LISINOPRIL 40 MG/1
40 TABLET ORAL 2 TIMES DAILY
Status: DISCONTINUED | OUTPATIENT
Start: 2025-04-21 | End: 2025-05-01 | Stop reason: HOSPADM

## 2025-04-21 RX ORDER — METHYLPREDNISOLONE 4 MG/1
16 TABLET ORAL ONCE
Status: COMPLETED | OUTPATIENT
Start: 2025-04-23 | End: 2025-04-23

## 2025-04-21 RX ORDER — AMLODIPINE BESYLATE 10 MG/1
10 TABLET ORAL EVERY MORNING
Status: DISCONTINUED | OUTPATIENT
Start: 2025-04-22 | End: 2025-05-01 | Stop reason: HOSPADM

## 2025-04-21 RX ORDER — METHYLPREDNISOLONE 4 MG/1
24 TABLET ORAL ONCE
Status: COMPLETED | OUTPATIENT
Start: 2025-04-21 | End: 2025-04-21

## 2025-04-21 RX ORDER — HYDROXYCHLOROQUINE SULFATE 200 MG/1
200 TABLET, FILM COATED ORAL EVERY 12 HOURS
Status: DISCONTINUED | OUTPATIENT
Start: 2025-04-21 | End: 2025-05-01 | Stop reason: HOSPADM

## 2025-04-21 RX ORDER — ALBUTEROL SULFATE 90 UG/1
2 INHALANT RESPIRATORY (INHALATION) EVERY 2 HOUR PRN
Status: DISCONTINUED | OUTPATIENT
Start: 2025-04-21 | End: 2025-05-01 | Stop reason: HOSPADM

## 2025-04-21 RX ORDER — METHYLPREDNISOLONE 4 MG/1
4 TABLET ORAL ONCE
Status: COMPLETED | OUTPATIENT
Start: 2025-04-26 | End: 2025-04-26

## 2025-04-21 RX ADMIN — ACETAMINOPHEN 650 MG: 325 TABLET, FILM COATED ORAL at 21:24

## 2025-04-21 RX ADMIN — LISINOPRIL 40 MG: 40 TABLET ORAL at 21:23

## 2025-04-21 RX ADMIN — METHYLPREDNISOLONE SODIUM SUCCINATE 125 MG: 125 INJECTION, POWDER, FOR SOLUTION INTRAMUSCULAR; INTRAVENOUS at 12:30

## 2025-04-21 RX ADMIN — ORPHENADRINE CITRATE 60 MG: 60 INJECTION INTRAMUSCULAR; INTRAVENOUS at 12:30

## 2025-04-21 RX ADMIN — LIDOCAINE 1 PATCH: 4 PATCH TOPICAL at 12:29

## 2025-04-21 RX ADMIN — DOXAZOSIN 1 MG: 2 TABLET ORAL at 21:23

## 2025-04-21 RX ADMIN — METHYLPREDNISOLONE 24 MG: 4 TABLET ORAL at 21:32

## 2025-04-21 RX ADMIN — OXYCODONE HYDROCHLORIDE 5 MG: 5 TABLET ORAL at 21:25

## 2025-04-21 RX ADMIN — LEVOTHYROXINE SODIUM 25 MCG: 25 TABLET ORAL at 21:24

## 2025-04-21 RX ADMIN — HYDROXYCHLOROQUINE SULFATE 200 MG: 200 TABLET ORAL at 23:03

## 2025-04-21 RX ADMIN — APIXABAN 5 MG: 5 TABLET, FILM COATED ORAL at 21:30

## 2025-04-21 SDOH — SOCIAL STABILITY: SOCIAL INSECURITY: ARE THERE ANY APPARENT SIGNS OF INJURIES/BEHAVIORS THAT COULD BE RELATED TO ABUSE/NEGLECT?: NO

## 2025-04-21 SDOH — SOCIAL STABILITY: SOCIAL INSECURITY: HAVE YOU HAD THOUGHTS OF HARMING ANYONE ELSE?: NO

## 2025-04-21 SDOH — HEALTH STABILITY: MENTAL HEALTH: HOW OFTEN DO YOU HAVE A DRINK CONTAINING ALCOHOL?: NEVER

## 2025-04-21 SDOH — ECONOMIC STABILITY: FOOD INSECURITY: WITHIN THE PAST 12 MONTHS, YOU WORRIED THAT YOUR FOOD WOULD RUN OUT BEFORE YOU GOT THE MONEY TO BUY MORE.: NEVER TRUE

## 2025-04-21 SDOH — SOCIAL STABILITY: SOCIAL INSECURITY: HAS ANYONE EVER THREATENED TO HURT YOUR FAMILY OR YOUR PETS?: NO

## 2025-04-21 SDOH — SOCIAL STABILITY: SOCIAL INSECURITY: ARE YOU MARRIED, WIDOWED, DIVORCED, SEPARATED, NEVER MARRIED, OR LIVING WITH A PARTNER?: WIDOWED

## 2025-04-21 SDOH — ECONOMIC STABILITY: HOUSING INSECURITY: IN THE PAST 12 MONTHS, HOW MANY TIMES HAVE YOU MOVED WHERE YOU WERE LIVING?: 0

## 2025-04-21 SDOH — SOCIAL STABILITY: SOCIAL NETWORK: HOW OFTEN DO YOU ATTEND MEETINGS OF THE CLUBS OR ORGANIZATIONS YOU BELONG TO?: 1 TO 4 TIMES PER YEAR

## 2025-04-21 SDOH — SOCIAL STABILITY: SOCIAL INSECURITY: WITHIN THE LAST YEAR, HAVE YOU BEEN HUMILIATED OR EMOTIONALLY ABUSED IN OTHER WAYS BY YOUR PARTNER OR EX-PARTNER?: NO

## 2025-04-21 SDOH — SOCIAL STABILITY: SOCIAL INSECURITY: DO YOU FEEL UNSAFE GOING BACK TO THE PLACE WHERE YOU ARE LIVING?: NO

## 2025-04-21 SDOH — ECONOMIC STABILITY: HOUSING INSECURITY: AT ANY TIME IN THE PAST 12 MONTHS, WERE YOU HOMELESS OR LIVING IN A SHELTER (INCLUDING NOW)?: NO

## 2025-04-21 SDOH — SOCIAL STABILITY: SOCIAL INSECURITY: WITHIN THE LAST YEAR, HAVE YOU BEEN AFRAID OF YOUR PARTNER OR EX-PARTNER?: NO

## 2025-04-21 SDOH — HEALTH STABILITY: PHYSICAL HEALTH
HOW OFTEN DO YOU NEED TO HAVE SOMEONE HELP YOU WHEN YOU READ INSTRUCTIONS, PAMPHLETS, OR OTHER WRITTEN MATERIAL FROM YOUR DOCTOR OR PHARMACY?: NEVER

## 2025-04-21 SDOH — SOCIAL STABILITY: SOCIAL NETWORK: HOW OFTEN DO YOU ATTEND CHURCH OR RELIGIOUS SERVICES?: 1 TO 4 TIMES PER YEAR

## 2025-04-21 SDOH — SOCIAL STABILITY: SOCIAL INSECURITY: HAVE YOU HAD ANY THOUGHTS OF HARMING ANYONE ELSE?: NO

## 2025-04-21 SDOH — ECONOMIC STABILITY: FOOD INSECURITY: WITHIN THE PAST 12 MONTHS, THE FOOD YOU BOUGHT JUST DIDN'T LAST AND YOU DIDN'T HAVE MONEY TO GET MORE.: NEVER TRUE

## 2025-04-21 SDOH — HEALTH STABILITY: PHYSICAL HEALTH: ON AVERAGE, HOW MANY DAYS PER WEEK DO YOU ENGAGE IN MODERATE TO STRENUOUS EXERCISE (LIKE A BRISK WALK)?: 7 DAYS

## 2025-04-21 SDOH — HEALTH STABILITY: MENTAL HEALTH: HOW OFTEN DO YOU HAVE SIX OR MORE DRINKS ON ONE OCCASION?: NEVER

## 2025-04-21 SDOH — SOCIAL STABILITY: SOCIAL INSECURITY
WITHIN THE LAST YEAR, HAVE YOU BEEN RAPED OR FORCED TO HAVE ANY KIND OF SEXUAL ACTIVITY BY YOUR PARTNER OR EX-PARTNER?: NO

## 2025-04-21 SDOH — SOCIAL STABILITY: SOCIAL NETWORK
DO YOU BELONG TO ANY CLUBS OR ORGANIZATIONS SUCH AS CHURCH GROUPS, UNIONS, FRATERNAL OR ATHLETIC GROUPS, OR SCHOOL GROUPS?: YES

## 2025-04-21 SDOH — SOCIAL STABILITY: SOCIAL NETWORK: IN A TYPICAL WEEK, HOW MANY TIMES DO YOU TALK ON THE PHONE WITH FAMILY, FRIENDS, OR NEIGHBORS?: THREE TIMES A WEEK

## 2025-04-21 SDOH — SOCIAL STABILITY: SOCIAL INSECURITY
WITHIN THE LAST YEAR, HAVE YOU BEEN KICKED, HIT, SLAPPED, OR OTHERWISE PHYSICALLY HURT BY YOUR PARTNER OR EX-PARTNER?: NO

## 2025-04-21 SDOH — SOCIAL STABILITY: SOCIAL INSECURITY: ARE YOU OR HAVE YOU BEEN THREATENED OR ABUSED PHYSICALLY, EMOTIONALLY, OR SEXUALLY BY ANYONE?: NO

## 2025-04-21 SDOH — SOCIAL STABILITY: SOCIAL INSECURITY: DO YOU FEEL ANYONE HAS EXPLOITED OR TAKEN ADVANTAGE OF YOU FINANCIALLY OR OF YOUR PERSONAL PROPERTY?: NO

## 2025-04-21 SDOH — ECONOMIC STABILITY: INCOME INSECURITY: IN THE PAST 12 MONTHS HAS THE ELECTRIC, GAS, OIL, OR WATER COMPANY THREATENED TO SHUT OFF SERVICES IN YOUR HOME?: NO

## 2025-04-21 SDOH — HEALTH STABILITY: MENTAL HEALTH: EXPERIENCED ANY OF THE FOLLOWING LIFE EVENTS: OTHER (COMMENT)

## 2025-04-21 SDOH — SOCIAL STABILITY: SOCIAL INSECURITY: ABUSE: ADULT

## 2025-04-21 SDOH — SOCIAL STABILITY: SOCIAL INSECURITY: DOES ANYONE TRY TO KEEP YOU FROM HAVING/CONTACTING OTHER FRIENDS OR DOING THINGS OUTSIDE YOUR HOME?: NO

## 2025-04-21 SDOH — HEALTH STABILITY: MENTAL HEALTH
DO YOU FEEL STRESS - TENSE, RESTLESS, NERVOUS, OR ANXIOUS, OR UNABLE TO SLEEP AT NIGHT BECAUSE YOUR MIND IS TROUBLED ALL THE TIME - THESE DAYS?: ONLY A LITTLE

## 2025-04-21 SDOH — ECONOMIC STABILITY: TRANSPORTATION INSECURITY: IN THE PAST 12 MONTHS, HAS LACK OF TRANSPORTATION KEPT YOU FROM MEDICAL APPOINTMENTS OR FROM GETTING MEDICATIONS?: NO

## 2025-04-21 SDOH — HEALTH STABILITY: MENTAL HEALTH: HOW MANY DRINKS CONTAINING ALCOHOL DO YOU HAVE ON A TYPICAL DAY WHEN YOU ARE DRINKING?: PATIENT DOES NOT DRINK

## 2025-04-21 SDOH — ECONOMIC STABILITY: FOOD INSECURITY: HOW HARD IS IT FOR YOU TO PAY FOR THE VERY BASICS LIKE FOOD, HOUSING, MEDICAL CARE, AND HEATING?: NOT HARD AT ALL

## 2025-04-21 SDOH — SOCIAL STABILITY: SOCIAL NETWORK: HOW OFTEN DO YOU GET TOGETHER WITH FRIENDS OR RELATIVES?: THREE TIMES A WEEK

## 2025-04-21 SDOH — HEALTH STABILITY: PHYSICAL HEALTH: ON AVERAGE, HOW MANY MINUTES DO YOU ENGAGE IN EXERCISE AT THIS LEVEL?: 150+ MIN

## 2025-04-21 SDOH — ECONOMIC STABILITY: HOUSING INSECURITY: IN THE LAST 12 MONTHS, WAS THERE A TIME WHEN YOU WERE NOT ABLE TO PAY THE MORTGAGE OR RENT ON TIME?: NO

## 2025-04-21 SDOH — SOCIAL STABILITY: SOCIAL INSECURITY: POSSIBLE ABUSE REPORTED TO:: OTHER (COMMENT)

## 2025-04-21 ASSESSMENT — PATIENT HEALTH QUESTIONNAIRE - PHQ9
1. LITTLE INTEREST OR PLEASURE IN DOING THINGS: NOT AT ALL
SUM OF ALL RESPONSES TO PHQ9 QUESTIONS 1 & 2: 0
2. FEELING DOWN, DEPRESSED OR HOPELESS: NOT AT ALL

## 2025-04-21 ASSESSMENT — ACTIVITIES OF DAILY LIVING (ADL)
TOILETING: INDEPENDENT
BATHING: INDEPENDENT
JUDGMENT_ADEQUATE_SAFELY_COMPLETE_DAILY_ACTIVITIES: YES
DRESSING YOURSELF: INDEPENDENT
WALKS IN HOME: INDEPENDENT
GROOMING: INDEPENDENT
ADEQUATE_TO_COMPLETE_ADL: YES
PATIENT'S MEMORY ADEQUATE TO SAFELY COMPLETE DAILY ACTIVITIES?: YES
LACK_OF_TRANSPORTATION: NO
FEEDING YOURSELF: INDEPENDENT
HEARING - LEFT EAR: FUNCTIONAL
HEARING - RIGHT EAR: FUNCTIONAL

## 2025-04-21 ASSESSMENT — LIFESTYLE VARIABLES
SKIP TO QUESTIONS 9-10: 1
HAVE YOU EVER FELT YOU SHOULD CUT DOWN ON YOUR DRINKING: NO
HOW MANY STANDARD DRINKS CONTAINING ALCOHOL DO YOU HAVE ON A TYPICAL DAY: PATIENT DOES NOT DRINK
HOW OFTEN DO YOU HAVE A DRINK CONTAINING ALCOHOL: NEVER
HAVE PEOPLE ANNOYED YOU BY CRITICIZING YOUR DRINKING: NO
AUDIT-C TOTAL SCORE: 0
AUDIT-C TOTAL SCORE: 0
PRESCIPTION_ABUSE_PAST_12_MONTHS: NO
SKIP TO QUESTIONS 9-10: 1
TOTAL SCORE: 0
EVER HAD A DRINK FIRST THING IN THE MORNING TO STEADY YOUR NERVES TO GET RID OF A HANGOVER: NO
SUBSTANCE_ABUSE_PAST_12_MONTHS: NO
HOW OFTEN DO YOU HAVE 6 OR MORE DRINKS ON ONE OCCASION: NEVER
AUDIT-C TOTAL SCORE: 0
EVER FELT BAD OR GUILTY ABOUT YOUR DRINKING: NO

## 2025-04-21 ASSESSMENT — PAIN DESCRIPTION - LOCATION: LOCATION: BACK

## 2025-04-21 ASSESSMENT — COGNITIVE AND FUNCTIONAL STATUS - GENERAL
PATIENT BASELINE BEDBOUND: NO
MOBILITY SCORE: 24
DAILY ACTIVITIY SCORE: 24

## 2025-04-21 ASSESSMENT — PAIN DESCRIPTION - ORIENTATION: ORIENTATION: LEFT

## 2025-04-21 ASSESSMENT — PAIN - FUNCTIONAL ASSESSMENT: PAIN_FUNCTIONAL_ASSESSMENT: 0-10

## 2025-04-21 ASSESSMENT — PAIN SCALES - GENERAL
PAINLEVEL_OUTOF10: 9
PAINLEVEL_OUTOF10: 0 - NO PAIN
PAINLEVEL_OUTOF10: 9

## 2025-04-21 ASSESSMENT — COLUMBIA-SUICIDE SEVERITY RATING SCALE - C-SSRS
1. IN THE PAST MONTH, HAVE YOU WISHED YOU WERE DEAD OR WISHED YOU COULD GO TO SLEEP AND NOT WAKE UP?: NO
6. HAVE YOU EVER DONE ANYTHING, STARTED TO DO ANYTHING, OR PREPARED TO DO ANYTHING TO END YOUR LIFE?: NO
2. HAVE YOU ACTUALLY HAD ANY THOUGHTS OF KILLING YOURSELF?: NO

## 2025-04-21 ASSESSMENT — PAIN SCALES - WONG BAKER
WONGBAKER_NUMERICALRESPONSE: HURTS WHOLE LOT
WONGBAKER_NUMERICALRESPONSE: NO HURT

## 2025-04-21 NOTE — PROGRESS NOTES
Pharmacy Medication History Review    Jing Bae is a 84 y.o. female admitted for No Principal Problem: There is no principal problem currently on the Problem List. Please update the Problem List and refresh.. Pharmacy reviewed the patient's onces-up-kfhwsifci medications and allergies for accuracy.    The list below reflectives the updated PTA list. Please review each medication in order reconciliation for additional clarification and justification.  Prior to Admission medications    Medication Sig Start Date End Date Taking? Authorizing Provider   acetaminophen (Tylenol 8 HOUR) 650 mg ER tablet Take 1 tablet (650 mg) by mouth twice a day.   Yes Historical Provider, MD   albuterol 90 mcg/actuation inhaler Inhale 2 puffs every 6 hours if needed.   Yes Historical Provider, MD   alendronate (Fosamax) 70 mg tablet Take 1 tablet (70 mg) by mouth 1 (one) time per week. Every Monday   Yes Historical Provider, MD   amLODIPine (Norvasc) 10 mg tablet TAKE 1 TABLET EVERY DAY  Patient taking differently: Take 1 tablet (10 mg) by mouth once daily in the morning. 1/10/25  Yes Milton Aviles PA-C   calcium carbonate (Calcium Antacid) 400 mg calcium (1,000 mg) chewable tablet Chew 400 mg once daily at noon. Take with meals.   Yes Historical Provider, MD   cholecalciferol (Vitamin D-3) 25 mcg (1,000 units) capsule Take 1 capsule (25 mcg) by mouth once daily at bedtime.   Yes Historical Provider, MD   cyanocobalamin (Vitamin B-12) 500 mcg tablet Take 1 tablet (500 mcg) by mouth once daily in the morning.   Yes Historical Provider, MD   docusate sodium (Colace) 100 mg capsule Take 1 capsule (100 mg) by mouth every 12 hours if needed for constipation.   Yes Historical Provider, MD   doxazosin (Cardura) 1 mg tablet TAKE 1 TABLET TWICE DAILY 3/21/25  Yes Milton Aviles PA-C   Eliquis 5 mg tablet TAKE 1 TABLET(5 MG) BY MOUTH TWICE DAILY 12/19/24  Yes Judy Ray MD   flaxseed oiL 1,000 mg capsule Take 1 capsule (1,000 mg) by  mouth once daily in the evening.   Yes Historical Provider, MD   folic acid (Folvite) 1 mg tablet Take 1 tablet (1 mg) by mouth once daily in the morning.   Yes Historical Provider, MD   hydroxychloroquine (Plaquenil) 200 mg tablet Take 1 tablet (200 mg) by mouth every 12 hours.   Yes Historical Provider, MD   lansoprazole (Prevacid) 30 mg DR capsule Take 1 capsule (30 mg) by mouth once daily in the morning. Take before meals. 7/31/24  Yes Historical MD Caroline   Lasix 40 mg tablet Take 1 tablet (40 mg) by mouth once daily.   Yes Historical Provider, MD   levothyroxine (Synthroid, Levoxyl) 25 mcg tablet Take 1 tablet (25 mcg) by mouth once daily at bedtime. Takes at 10 pm   Yes Historical Provider, MD   lisinopril 40 mg tablet Take 1 tablet (40 mg) by mouth 2 times a day.   Yes Historical Provider, MD   loratadine (Claritin) 10 mg tablet Take 1 tablet (10 mg) by mouth once daily in the morning.   Yes Historical Provider, MD   methotrexate (Trexall) 2.5 mg tablet Take 6 tablets (15 mg total) by mouth once a week.  Every Sunday   Yes Historical Provider, MD   multivitamin tablet Take 1 tablet by mouth once daily in the morning.   Yes Historical Provider, MD   nystatin (Mycostatin) 100,000 unit/gram powder Apply 1 Application topically if needed.   Yes Historical Provider, MD   potassium chloride CR 20 mEq ER tablet Take 1 tablet (20 mEq) by mouth once daily in the morning.   Yes Historical Provider, MD   sulfaSALAzine (Azulfidine) 500 mg tablet Take 2 tablets (1,000 mg) by mouth 2 times a day.   Yes Historical Provider, MD   vitamins A,C,E-zinc-copper 2,148 mcg-113 mg-45 mg-17.4mg tablet Take 1 tablet by mouth 2 times a day.   Yes Historical Provider, MD   rosuvastatin (Crestor) 10 mg tablet Take 1 tablet (10 mg) by mouth once daily. 3/6/25 3/6/26 no Judy Ray MD        The list below reflectives the updated allergy list. Please review each documented allergy for additional clarification and  justification.  Allergies  Reviewed by Jg Jara RN on 4/21/2025        Severity Reactions Comments    Bactrim [sulfamethoxazole-trimethoprim] Medium Nausea/vomiting     Ciprofloxacin Medium Other Tendon rupture    Ibuprofen Medium Other on Eliquis and daily aspirin    Penicillins Medium Hives     Rosuvastatin Medium Headache, Dizziness     Statins-hmg-coa Reductase Inhibitors Medium Myalgia     Sulfa (sulfonamide Antibiotics) Medium Nausea/vomiting     Trimethoprim Medium Nausea/vomiting     Zoster Vaccine Live (pf) Medium GI Upset             Below are additional concerns with the patient's PTA list.      Majo Louis

## 2025-04-21 NOTE — ED PROVIDER NOTES
HPI   Chief Complaint   Patient presents with    Back Pain       84-year-old female presenting with lower back pain.  She states this started about 3 days ago.  She states she was wearing her CPAP in bed and was uncomfortable laying on her back and when she rolled over to her left she felt a twinge in the lower back on the left side.  Patient states the pain is getting worse.  She typically ambulates with a cane and now is had to use a walker.  Today she was unable to use the walker.  She called her primary care physician to see if she could call in some medicine for her but her physician requested she go to the office.  Patient was not able to go to the office and called an ambulance to transport her to the emergency room.  Denies any direct trauma.  No loss of bladder or bowel control.  No paresthesias.              Patient History   Medical History[1]  Surgical History[2]  Family History[3]  Social History[4]    Physical Exam   ED Triage Vitals [04/21/25 1135]   Temperature Heart Rate Respirations BP   36.4 °C (97.5 °F) 80 16 (!) 191/84      Pulse Ox Temp src Heart Rate Source Patient Position   98 % -- -- --      BP Location FiO2 (%)     Right arm --       Physical Exam  Vitals and nursing note reviewed.   Constitutional:       General: She is not in acute distress.     Appearance: She is well-developed.   HENT:      Head: Normocephalic and atraumatic.   Eyes:      Conjunctiva/sclera: Conjunctivae normal.   Cardiovascular:      Rate and Rhythm: Normal rate and regular rhythm.      Heart sounds: No murmur heard.  Pulmonary:      Effort: Pulmonary effort is normal. No respiratory distress.      Breath sounds: Normal breath sounds.   Abdominal:      Palpations: Abdomen is soft.      Tenderness: There is no abdominal tenderness.   Musculoskeletal:         General: No swelling.      Cervical back: Neck supple.        Back:    Skin:     General: Skin is warm and dry.      Capillary Refill: Capillary refill takes less  than 2 seconds.   Neurological:      Mental Status: She is alert.   Psychiatric:         Mood and Affect: Mood normal.           ED Course & MDM   Diagnoses as of 04/21/25 1649   Lumbar strain, initial encounter                 No data recorded     Sherman Coma Scale Score: 15 (04/21/25 1134 : Jg Jara RN)                           Medical Decision Making  84-year-old female presenting with back pain.  She is unable to ambulate secondary to pain.  No evidence of cauda equina syndrome by history or examination.  IV line was established.  Basic lab work will be obtained.  Patient is medicated with Norflex, Lidoderm patch, Solu-Medrol milligrams.  Imaging of the lumbar spine will be obtained.  Patient feeling improved while sitting in bed although she has not been able to get out of bed.  CBC shows normal white blood cell count, hemoglobin.  Her platelets are low at 113.  Creatinine near baseline at 1.13.  Initial potassium came back elevated however the specimen was hemolyzed.  Her second potassium is normal.  CT of the lumbar spine was negative for anything acute To try to get her up out of the bed and she pulled her up self up to sitting.  Required me and another nurse to get her to the sitting position.  At this point both she and her son did not feel comfortable with her going home.        Procedure  Procedures         [1]   Past Medical History:  Diagnosis Date    Aortic stenosis 12/03/2023    mild    Atrial fibrillation (Multi) 12/03/2023    Bradycardia 12/03/2023    Off BB    Chest tightness 05/23/2024    Chronic heart failure with preserved ejection fraction 03/06/2025    Chronic kidney disease (CKD) 12/03/2023    Coronary artery calcification 05/23/2024    Coronary calcification      Coronary artery disease involving native coronary artery of native heart without angina pectoris 05/23/2024    Essential (primary) hypertension     Benign hypertension    Hyperlipidemia 10/25/2023    Hypertension  2023    Localized edema 2022    Mitral valve regurgitation 2023    Mild-moderate    Obstructive sleep apnea syndrome 2023    CPAP    MARTA on CPAP 2023    Paroxysmal atrial fibrillation (Multi) 2023    PAF newly diagnosed in 2018. HR controlled by itself. On Eliquis. [id number] score 4.   [2]   Past Surgical History:  Procedure Laterality Date    HYSTERECTOMY     [3]   Family History  Problem Relation Name Age of Onset    Ovarian cancer Father's Sister     [4]   Social History  Tobacco Use    Smoking status: Former     Current packs/day: 0.00     Average packs/day: 1 pack/day for 50.0 years (50.0 ttl pk-yrs)     Types: Cigarettes     Start date: 1948     Quit date: 1998     Years since quittin.3     Passive exposure: Past    Smokeless tobacco: Never   Substance Use Topics    Alcohol use: Not on file    Drug use: Not on file        Vasquez Harris DO  [id number]

## 2025-04-21 NOTE — H&P
Internal Medicine History & Physical             PATIENT NAME: Jing Bae  MRN: [id number]    SERVICE DATE:  4/21/2025  SERVICE TIME:  5:26 PM    Jing Bae is a 84 y.o. female on day 0 of admission presenting with Lumbar strain, initial encounter.      HPI    Patient is a 84-year-old female with medical history significant for A-fib on anticoagulation, aortic stenosis, sinus bradycardia, CKD stage III, CAD, hypertension, hyperlipidemia, MARTA on CPAP, rheumatoid arthritis, hypothyroidism presenting to [facility] with low back pain.  Patient noted that she was noticing some lower back discomfort on Thursday that has significantly worsened to where she could no longer get out of bed without assistance on 4/20 which prompted her to come to the hospital for further evaluation.  Patient noted that at baseline she uses a walker and is able to roll herself out of bed and get up without any assistance however the day prior to admission she needed help from her son and was unable to properly get out of bed due to how significant her lower back pain is.  Patient describes the low back pain as a sharp pain located exactly at the lumbosacral joint.  She denies any numbness and tingling that radiates down to her feet, loss of her urinary function or bowels, or any muscle spasms.  She denied any major trauma or falls that could have caused her to have's onset of back pain.  Patient also denies any fevers, chills, nausea, vomiting, chest pain, shortness of breath.    In the emergency department, patient is hemodynamically stable.  Laboratory workup unremarkable.  CT imaging also does not show any acute findings.  Patient is admitted to general medicine team for further evaluation due to significant low back pain that is limiting patient's activities of daily living.    Past Medical History: As mentioned above  Surgical History: Hysterectomy  Social History:  Currently denies smoking, alcohol use, illicit drug use.  Lives at home by herself  Family History: Ovarian cancer on paternal father side.    ROS  Complete 12 point review of systems was negative aside from HPI.    OBJECTIVE  Vitals:    04/21/25 1315 04/21/25 1600 04/21/25 1615 04/21/25 1630   BP:    180/80   BP Location:       Pulse: 77 72 81 78   Resp:   18 16   Temp:       SpO2: 94% (!) 90% (!) 93% 95%      Results from last 7 days   Lab Units 04/21/25  1217   WBC AUTO x10*3/uL 5.2   HEMOGLOBIN g/dL 12.9   HEMATOCRIT % 42.7   PLATELETS AUTO x10*3/uL 113*   NEUTROS PCT AUTO % 76.8   LYMPHS PCT AUTO % 13.1   MONOS PCT AUTO % 8.3   EOS PCT AUTO % 1.0      Results from last 7 days   Lab Units 04/21/25  1357 04/21/25  1217   SODIUM mmol/L  --  136   POTASSIUM mmol/L 4.7 7.3*   CHLORIDE mmol/L  --  104   CO2 mmol/L  --  23   BUN mg/dL  --  33*   CREATININE mg/dL  --  1.16*   CALCIUM mg/dL  --  9.2   PROTEIN TOTAL g/dL  --  7.8   BILIRUBIN TOTAL mg/dL  --  0.7   ALK PHOS U/L  --  65   ALT U/L  --  15   AST U/L  --  57*   GLUCOSE mg/dL  --  117*       Physical Exam  Physical Exam:  General:  Pleasant and cooperative.   HEENT:  Normocephalic, atraumatic, mucus membranes moist.   Neck:  Trachea midline.  No JVD.    Chest:  Clear to auscultation bilaterally. No wheezes, rales, or rhonchi.  CV:  Regular rate and rhythm.  Positive S1/S2.   Abdomen: Bowel sounds present in all four quadrants, abdomen is soft, non-tender, non-distended.  Extremities:  No lower extremity edema or cyanosis.   Neurological:  AAOx3. No focal deficits.  Skin:  Warm and dry.       ASSESSMENT & PLAN    Patient is a 84-year-old female with medical history significant for A-fib on anticoagulation, aortic stenosis, sinus bradycardia, CKD stage III, CAD, hypertension, hyperlipidemia, MARTA on CPAP, rheumatoid arthritis, [facility]     Low back pain, musculoskeletal pain  Paraspinal muscle tenderness  Morbid  obesity    PLAN:  - Patient started on a Medrol Dosepak  - Patient started on Flexeril 5 mg 3 times daily as needed  - Tylenol for mild pain  - PT/OT    CKD stage III    PLAN:  - Monitor patient's creatinine.  Currently at baseline.  - Avoiding Toradol and NSAIDs especially in the setting of her chronic kidney disease.    CAD  A-fib on Eliquis  Hypertension  Hyperlipidemia  PLAN:  - Continue anticoagulation  - Continue amlodipine and lisinopril  - Continue diuretic  - Continue statin    Chronic Conditions  Hypothyroidism-continue Synthroid  MARTA-continue CPAP      DVT ppx: Eliquis  GI ppx: Protonix  IVF: None  Diet: Regular diet  Consults: PT/OT  CODE STATUS: Full code    Blake Allen, DO  Please SecureChat for any further questions  This is a preliminary note, please await attending attestation for final A/P

## 2025-04-21 NOTE — ED TRIAGE NOTES
Pt from home with lower back pain. She rolled over in bed and had sudden lower back pain. Worsening pain over the last few days. No loss of bowel or bladder control.

## 2025-04-22 ENCOUNTER — HOME HEALTH ADMISSION (OUTPATIENT)
Dept: HOME HEALTH SERVICES | Facility: HOME HEALTH | Age: 85
End: 2025-04-22
Payer: MEDICARE

## 2025-04-22 ENCOUNTER — DOCUMENTATION (OUTPATIENT)
Dept: HOME HEALTH SERVICES | Facility: HOME HEALTH | Age: 85
End: 2025-04-22
Payer: MEDICARE

## 2025-04-22 ENCOUNTER — PHARMACY VISIT (OUTPATIENT)
Dept: PHARMACY | Facility: CLINIC | Age: 85
End: 2025-04-22
Payer: COMMERCIAL

## 2025-04-22 LAB
ALBUMIN SERPL BCP-MCNC: 4.2 G/DL (ref 3.4–5)
ANION GAP SERPL CALC-SCNC: 13 MMOL/L (ref 10–20)
BUN SERPL-MCNC: 27 MG/DL (ref 6–23)
CALCIUM SERPL-MCNC: 9.3 MG/DL (ref 8.6–10.3)
CHLORIDE SERPL-SCNC: 103 MMOL/L (ref 98–107)
CO2 SERPL-SCNC: 27 MMOL/L (ref 21–32)
CREAT SERPL-MCNC: 1.01 MG/DL (ref 0.5–1.05)
EGFRCR SERPLBLD CKD-EPI 2021: 55 ML/MIN/1.73M*2
ERYTHROCYTE [DISTWIDTH] IN BLOOD BY AUTOMATED COUNT: 15.1 % (ref 11.5–14.5)
GLUCOSE SERPL-MCNC: 125 MG/DL (ref 74–99)
HCT VFR BLD AUTO: 40.4 % (ref 36–46)
HGB BLD-MCNC: 12.7 G/DL (ref 12–16)
HOLD SPECIMEN: NORMAL
MAGNESIUM SERPL-MCNC: 1.94 MG/DL (ref 1.6–2.4)
MCH RBC QN AUTO: 31.1 PG (ref 26–34)
MCHC RBC AUTO-ENTMCNC: 31.4 G/DL (ref 32–36)
MCV RBC AUTO: 99 FL (ref 80–100)
NRBC BLD-RTO: 0 /100 WBCS (ref 0–0)
PHOSPHATE SERPL-MCNC: 4.3 MG/DL (ref 2.5–4.9)
PLATELET # BLD AUTO: 143 X10*3/UL (ref 150–450)
POTASSIUM SERPL-SCNC: 4.6 MMOL/L (ref 3.5–5.3)
RBC # BLD AUTO: 4.08 X10*6/UL (ref 4–5.2)
SODIUM SERPL-SCNC: 138 MMOL/L (ref 136–145)
WBC # BLD AUTO: 6.4 X10*3/UL (ref 4.4–11.3)

## 2025-04-22 PROCEDURE — 85027 COMPLETE CBC AUTOMATED: CPT

## 2025-04-22 PROCEDURE — G0378 HOSPITAL OBSERVATION PER HR: HCPCS

## 2025-04-22 PROCEDURE — 80069 RENAL FUNCTION PANEL: CPT

## 2025-04-22 PROCEDURE — 2500000004 HC RX 250 GENERAL PHARMACY W/ HCPCS (ALT 636 FOR OP/ED)

## 2025-04-22 PROCEDURE — RXMED WILLOW AMBULATORY MEDICATION CHARGE

## 2025-04-22 PROCEDURE — 36415 COLL VENOUS BLD VENIPUNCTURE: CPT

## 2025-04-22 PROCEDURE — 2500000005 HC RX 250 GENERAL PHARMACY W/O HCPCS: Performed by: EMERGENCY MEDICINE

## 2025-04-22 PROCEDURE — 97161 PT EVAL LOW COMPLEX 20 MIN: CPT | Mod: GP

## 2025-04-22 PROCEDURE — 96375 TX/PRO/DX INJ NEW DRUG ADDON: CPT

## 2025-04-22 PROCEDURE — 2500000001 HC RX 250 WO HCPCS SELF ADMINISTERED DRUGS (ALT 637 FOR MEDICARE OP): Performed by: INTERNAL MEDICINE

## 2025-04-22 PROCEDURE — 99239 HOSP IP/OBS DSCHRG MGMT >30: CPT | Performed by: INTERNAL MEDICINE

## 2025-04-22 PROCEDURE — 83735 ASSAY OF MAGNESIUM: CPT

## 2025-04-22 PROCEDURE — 97165 OT EVAL LOW COMPLEX 30 MIN: CPT | Mod: GO

## 2025-04-22 PROCEDURE — 2500000001 HC RX 250 WO HCPCS SELF ADMINISTERED DRUGS (ALT 637 FOR MEDICARE OP)

## 2025-04-22 RX ORDER — METHYLPREDNISOLONE 4 MG/1
TABLET ORAL
Qty: 1 EACH | Refills: 0 | Status: SHIPPED | OUTPATIENT
Start: 2025-04-22 | End: 2025-04-22

## 2025-04-22 RX ORDER — METHYLPREDNISOLONE 4 MG/1
TABLET ORAL
Qty: 21 EACH | Refills: 0 | Status: SHIPPED | OUTPATIENT
Start: 2025-04-22 | End: 2025-04-26 | Stop reason: HOSPADM

## 2025-04-22 RX ORDER — KETOROLAC TROMETHAMINE 30 MG/ML
15 INJECTION, SOLUTION INTRAMUSCULAR; INTRAVENOUS ONCE
Status: COMPLETED | OUTPATIENT
Start: 2025-04-22 | End: 2025-04-22

## 2025-04-22 RX ORDER — CYCLOBENZAPRINE HCL 5 MG
5 TABLET ORAL 3 TIMES DAILY PRN
Qty: 20 TABLET | Refills: 0 | Status: SHIPPED | OUTPATIENT
Start: 2025-04-22 | End: 2025-04-29

## 2025-04-22 RX ORDER — PANTOPRAZOLE SODIUM 40 MG/1
40 TABLET, DELAYED RELEASE ORAL
Status: DISCONTINUED | OUTPATIENT
Start: 2025-04-23 | End: 2025-04-22

## 2025-04-22 RX ORDER — PANTOPRAZOLE SODIUM 40 MG/1
40 TABLET, DELAYED RELEASE ORAL
Status: DISCONTINUED | OUTPATIENT
Start: 2025-04-22 | End: 2025-05-01 | Stop reason: HOSPADM

## 2025-04-22 RX ORDER — ACETAMINOPHEN 325 MG/1
975 TABLET ORAL 2 TIMES DAILY
Status: DISCONTINUED | OUTPATIENT
Start: 2025-04-22 | End: 2025-05-01 | Stop reason: HOSPADM

## 2025-04-22 RX ADMIN — LIDOCAINE 1 PATCH: 4 PATCH TOPICAL at 09:01

## 2025-04-22 RX ADMIN — APIXABAN 5 MG: 5 TABLET, FILM COATED ORAL at 09:01

## 2025-04-22 RX ADMIN — PANTOPRAZOLE SODIUM 40 MG: 40 TABLET, DELAYED RELEASE ORAL at 10:29

## 2025-04-22 RX ADMIN — APIXABAN 5 MG: 5 TABLET, FILM COATED ORAL at 21:21

## 2025-04-22 RX ADMIN — KETOROLAC TROMETHAMINE 15 MG: 30 INJECTION, SOLUTION INTRAMUSCULAR at 16:33

## 2025-04-22 RX ADMIN — AMLODIPINE BESYLATE 10 MG: 10 TABLET ORAL at 09:01

## 2025-04-22 RX ADMIN — LISINOPRIL 40 MG: 40 TABLET ORAL at 09:01

## 2025-04-22 RX ADMIN — HYDROXYCHLOROQUINE SULFATE 200 MG: 200 TABLET ORAL at 10:29

## 2025-04-22 RX ADMIN — OXYCODONE HYDROCHLORIDE 5 MG: 5 TABLET ORAL at 15:52

## 2025-04-22 RX ADMIN — METHYLPREDNISOLONE 20 MG: 4 TABLET ORAL at 12:43

## 2025-04-22 RX ADMIN — CYCLOBENZAPRINE 5 MG: 10 TABLET, FILM COATED ORAL at 15:59

## 2025-04-22 RX ADMIN — HYDROXYCHLOROQUINE SULFATE 200 MG: 200 TABLET ORAL at 22:44

## 2025-04-22 RX ADMIN — DOXAZOSIN 1 MG: 2 TABLET ORAL at 21:08

## 2025-04-22 RX ADMIN — FUROSEMIDE 40 MG: 40 TABLET ORAL at 09:01

## 2025-04-22 RX ADMIN — ACETAMINOPHEN 975 MG: 325 TABLET, FILM COATED ORAL at 22:40

## 2025-04-22 RX ADMIN — DOXAZOSIN 1 MG: 2 TABLET ORAL at 09:30

## 2025-04-22 RX ADMIN — LEVOTHYROXINE SODIUM 25 MCG: 25 TABLET ORAL at 21:11

## 2025-04-22 RX ADMIN — ACETAMINOPHEN 975 MG: 325 TABLET, FILM COATED ORAL at 10:28

## 2025-04-22 RX ADMIN — LISINOPRIL 40 MG: 40 TABLET ORAL at 21:10

## 2025-04-22 ASSESSMENT — COGNITIVE AND FUNCTIONAL STATUS - GENERAL
STANDING UP FROM CHAIR USING ARMS: A LITTLE
WALKING IN HOSPITAL ROOM: A LITTLE
MOBILITY SCORE: 22
TOILETING: A LITTLE
TOILETING: A LITTLE
HELP NEEDED FOR BATHING: A LITTLE
CLIMB 3 TO 5 STEPS WITH RAILING: A LITTLE
DAILY ACTIVITIY SCORE: 23
CLIMB 3 TO 5 STEPS WITH RAILING: A LITTLE
DAILY ACTIVITIY SCORE: 20
MOBILITY SCORE: 19
TURNING FROM BACK TO SIDE WHILE IN FLAT BAD: A LITTLE
WALKING IN HOSPITAL ROOM: A LITTLE
DRESSING REGULAR LOWER BODY CLOTHING: A LOT
MOVING TO AND FROM BED TO CHAIR: A LITTLE

## 2025-04-22 ASSESSMENT — PAIN SCALES - GENERAL
PAINLEVEL_OUTOF10: 5 - MODERATE PAIN
PAINLEVEL_OUTOF10: 0 - NO PAIN
PAINLEVEL_OUTOF10: 10 - WORST POSSIBLE PAIN
PAINLEVEL_OUTOF10: 0 - NO PAIN
PAINLEVEL_OUTOF10: 5 - MODERATE PAIN
PAINLEVEL_OUTOF10: 6

## 2025-04-22 ASSESSMENT — PAIN - FUNCTIONAL ASSESSMENT
PAIN_FUNCTIONAL_ASSESSMENT: 0-10

## 2025-04-22 ASSESSMENT — PAIN SCALES - WONG BAKER: WONGBAKER_NUMERICALRESPONSE: NO HURT

## 2025-04-22 NOTE — PROGRESS NOTES
Physical Therapy    Physical Therapy Evaluation    Patient Name: Jing Bae  MRN: [id number]  Today's Date: 4/22/2025   Time Calculation  Start Time: 1030  Stop Time: 1055  Time Calculation (min): 25 min  706/706-A    Assessment/Plan   PT Assessment  PT Assessment Results: Decreased strength, Decreased endurance, Impaired balance, Decreased mobility  Rehab Prognosis: Good  Barriers to Discharge Home: No anticipated barriers  Evaluation/Treatment Tolerance: Patient limited by fatigue  Strengths: Ability to acquire knowledge, Capable of completing ADLs semi/independent  Barriers to Participation: Comorbidities  End of Session Communication: Bedside nurse  Assessment Comment: Pt admitted 4/21 with low back pain and found to have strained muscle with all imaging negative. Pt demos decreased endurance and pain with mobility. Pt would benefit from low intensity therapy.  End of Session Patient Position: Up in chair, Alarm on  IP OR SWING BED PT PLAN  Inpatient or Swing Bed: Inpatient  PT Plan  Treatment/Interventions: Bed mobility, Transfer training, Gait training, Balance training, Strengthening, Endurance training, Therapeutic exercise, Therapeutic activity, Home exercise program, Positioning  PT Plan: Ongoing PT  PT Frequency: 3 times per week  PT Discharge Recommendations: Low intensity level of continued care  PT - OK to Discharge: Yes    Subjective     Current Problem:  1. Lumbar strain, initial encounter  cyclobenzaprine (Flexeril) 5 mg tablet      2. Acute midline low back pain without sciatica  Referral to Physical Therapy    Referral to Occupational Therapy    methylPREDNISolone (Medrol, Dante,) 4 mg tablets    DISCONTINUED: methylPREDNISolone (Medrol, Dante,) 4 mg tablets      3. Acute left-sided low back pain without sciatica  cyclobenzaprine (Flexeril) 5 mg tablet    methylPREDNISolone (Medrol, Dante,) 4 mg tablets    Referral to Home Care    DISCONTINUED: methylPREDNISolone (Medrol, Dante,) 4 mg tablets         Problem List[1]    General Visit Information:  General  Reason for Referral: PT eval and treat; impaired mobility  Referred By: Blake Allen DO  Past Medical History Relevant to Rehab: 4/21/25: lumbar strain, low back pain, unable to get out of bed.   CT lumbar spine: degenerative changesno acute findings;  PMH: A fib, CAD, CKD, HTN, HLD, hypothyroidism, aortic stenosis, sinus bradycardia, MARTA, on CPAP,  RA, morbid obesity  Co-Treatment: OT  Co-Treatment Reason: to maximize patient safety and participation  Prior to Session Communication: Bedside nurse  Patient Position Received: Bed, 2 rail up, Alarm on  General Comment: Pt pleasant and agreeable to therapy.    Home Living:  Home Living  Home Living Comments: Lives alone in one story condo with 1 step to enter. Independent ADLs, transfers & mobility with cane prn, used rollator for outings. Independent IADLs & driving; son local/supportive. Owns high toilet with grab bar, walk in shower with seat, grab bar & hand held shower.    Prior Level of Function:  Prior Function Per Pt/Caregiver Report  Level of [place]: Independent with ADLs and functional transfers, Independent with homemaking with ambulation    Precautions:  Precautions  Medical Precautions: Fall precautions  Precautions Comment: OOB with assist     Objective     Pain:  Pain Assessment  Pain Assessment: 0-10  0-10 (Numeric) Pain Score: 5 - Moderate pain  Pain Type: Acute pain  Pain Location: Back  Pain Orientation: Left, Lower  Pain Interventions: Repositioned    Cognition:  Cognition  Overall Cognitive Status: Within Functional Limits    General Assessments:      Activity Tolerance  Endurance: Decreased tolerance for upright activites  Sensation  Light Touch: No apparent deficits  Strength  Strength Comments: BLE WFL at least 3+/5     Coordination  Movements are Fluid and Coordinated: Yes     Functional Assessments:     Bed Mobility  Bed Mobility:  (completed supine to sit with Michelle x1 and log  roll technique. Denies dizziness.)  Transfers  Transfer:  (from EOB to FWW with CGA and Michelle x1 from low surface toilet.)  Ambulation/Gait Training  Ambulation/Gait Training Performed:  (completed ~10'x1 and ~20'x1 with use of FWW and CGA for safety. Denies dizziness. No acute LOB. Denies LBP with ambulation.)     Extremity/Trunk Assessments:     RLE   RLE : Within Functional Limits  LLE   LLE : Within Functional Limits    Outcome Measures:     [facility] Basic Mobility  Turning from your back to your side while in a flat bed without using bedrails: None  Moving from lying on your back to sitting on the side of a flat bed without using bedrails: A little  Moving to and from bed to chair (including a wheelchair): A little  Standing up from a chair using your arms (e.g. wheelchair or bedside chair): A little  To walk in hospital room: A little  Climbing 3-5 steps with railing: A little  Basic Mobility - Total Score: 19     Goals:  Encounter Problems       Encounter Problems (Active)       PT Problem       PT Goal 1 STG - Pt will transition supine <> sitting with MOD I  (Progressing)       Start:  04/22/25    Expected End:  05/06/25            PT Goal 2 STG - Pt will transfer STS with MOD I  (Progressing)       Start:  04/22/25    Expected End:  05/06/25            PT Goal 3 STG - Pt will amb 75' using FWW with MOD I  (Progressing)       Start:  04/22/25    Expected End:  05/06/25            PT Goal 4 STG - Pt will perform a B LE ther ex program of 2-3 sets of 10  (Progressing)       Start:  04/22/25    Expected End:  05/06/25               Pain - Adult            Education Documentation  Precautions, taught by Rosaura Juarez PT at 4/22/2025  3:39 PM.  Learner: Patient  Readiness: Acceptance  Method: Explanation  Response: Verbalizes Understanding, Needs Reinforcement    Mobility Training, taught by Rosaura Juarez PT at 4/22/2025  3:39 PM.  Learner: Patient  Readiness: Acceptance  Method: Explanation  Response: Verbalizes  Understanding, Needs Reinforcement    Education Comments  No comments found.              [1]   Patient Active Problem List  Diagnosis    Paroxysmal atrial fibrillation (Multi)    Bradycardia    Chronic kidney disease (CKD)    Edema leg    Fatigue    Hypertension    Hypothyroidism    Interstitial lung disease (Multi)    Lower back pain    Class 2 obesity with body mass index (BMI) of 39.0 to 39.9 in adult    Osteoporosis    Pulmonary vascular congestion    Seronegative rheumatoid arthritis (Multi)    Thrombocytopenia (CMS-HCC)    Vaccine counseling    Chondrocalcinosis    Anemia    Mitral valve regurgitation    Aortic stenosis    Obstructive sleep apnea syndrome    Shortness of breath at rest    Arthralgia of hip    Left ventricular systolic dysfunction (LVSD)    Localized edema    Hyperlipidemia    Lung nodule    Candidiasis of skin    Coronary artery disease involving native coronary artery of native heart without angina pectoris    Chest tightness    Chronic heart failure with preserved ejection fraction    Lumbar strain, initial encounter

## 2025-04-22 NOTE — CARE PLAN
The patient's goals for the shift include pain relief    The clinical goals for the shift include comfort

## 2025-04-22 NOTE — DISCHARGE SUMMARY
Discharge Diagnosis  Lumbar strain, initial encounter           Issues Requiring Follow-Up  Lumbosacral strain    Discharge Meds     Medication List      START taking these medications     methylPREDNISolone 4 mg tablets; Commonly known as: Medrol (Dante); Follow   schedule on package instructions     CHANGE how you take these medications     amLODIPine 10 mg tablet; Commonly known as: Norvasc; TAKE 1 TABLET EVERY   DAY; What changed: when to take this     CONTINUE taking these medications     acetaminophen 650 mg ER tablet; Commonly known as: Tylenol 8 HOUR   albuterol 90 mcg/actuation inhaler   alendronate 70 mg tablet; Commonly known as: Fosamax   Calcium Antacid 400 mg calcium (1,000 mg) chewable tablet; Generic drug:   calcium carbonate   cholecalciferol 25 mcg (1,000 units) capsule; Commonly known as: Vitamin   D-3   cyanocobalamin 500 mcg tablet; Commonly known as: Vitamin B-12   docusate sodium 100 mg capsule; Commonly known as: Colace   doxazosin 1 mg tablet; Commonly known as: Cardura; TAKE 1 TABLET TWICE   DAILY   Eliquis 5 mg tablet; Generic drug: apixaban; TAKE 1 TABLET(5 MG) BY   MOUTH TWICE DAILY   flaxseed oiL 1,000 mg capsule   folic acid 1 mg tablet; Commonly known as: Folvite   hydroxychloroquine 200 mg tablet; Commonly known as: Plaquenil   lansoprazole 30 mg DR capsule; Commonly known as: Prevacid   Lasix 40 mg tablet; Generic drug: furosemide   levothyroxine 25 mcg tablet; Commonly known as: Synthroid, Levoxyl   lisinopril 40 mg tablet   loratadine 10 mg tablet; Commonly known as: Claritin   methotrexate 2.5 mg tablet; Commonly known as: Trexall   multivitamin tablet   nystatin 100,000 unit/gram powder; Commonly known as: Mycostatin   potassium chloride CR 20 mEq ER tablet; Commonly known as: Klor-Con M20   sulfaSALAzine 500 mg tablet; Commonly known as: Azulfidine   vitamins A,C,E-zinc-copper 2,148 mcg-113 mg-45 mg-17.4mg tablet     ASK your doctor about these medications     rosuvastatin 10 mg  tablet; Commonly known as: Crestor; Take 1 tablet (10   mg) by mouth once daily.       Test Results Pending At Discharge  Pending Labs       No current pending labs.            Hospital Course   Patient is a 84-year-old female with medical history significant forA-fib on anticoagulation, aortic stenosis, sinus bradycardia, CKD stage III, CAD, hypertension, hyperlipidemia, MARTA on CPAP, rheumatoid arthritis, hypothyroidism presenting to [facility] with low back pain.  Workup was negative for sciatic pain, trauma, electrolyte abnormalities, infection as the cause of her lower back pain.  Pain improved with Flexeril and steroids.  Patient noted that her pain is much better and able to sit up by herself without assistance at bedside.  AM-PAC is 20.  Patient is stable to be discharged to home.    Pertinent Physical Exam At Time of Discharge  Physical Exam    Physical Exam:  General:  Pleasant and cooperative. No apparent distress.  HEENT:  Normocephalic, atraumatic, mucus membranes moist.   Neck:  Trachea midline.  No JVD.    Chest:  Clear to auscultation bilaterally. No wheezes, rales, or rhonchi.  CV:  Regular rate and rhythm.  Positive S1/S2.   Abdomen: Bowel sounds present in all four quadrants, abdomen is soft, non-tender, non-distended.  Extremities:  No lower extremity edema or cyanosis.   Neurological:  AAOx3. No focal deficits.  Skin:  Warm and dry.      Outpatient Follow-Up  Future Appointments   Date Time Provider Department Center   9/9/2025  3:00 PM Judy Ray MD [id number] [facility]

## 2025-04-22 NOTE — NURSING NOTE
Redness developed around IV site.  IV assessed and patent with no pain with flushing and no leaking/infiltration with flushing noted.  IV left in at this time .  Dressing changed.

## 2025-04-22 NOTE — PROGRESS NOTES
Jing Bae is a 84 y.o. female on day 0 of admission presenting with Lumbar strain, initial encounter.  Record reviewed.  Presented to ED from home for worsening LBP.  PMH includes A-fib (on AC), aortic stenosis, CKD III, HTN, MARTA on CPAP, RA.  This TCC attempted to meet with patient to complete Care Transitions assessment.  Patient with other members of care team.  Care Transitions will continue to follow and re-attempt as able.    1205 pm addendum     04/22/25 1251   Discharge Planning   Living Arrangements Alone   Support Systems Children;Family members;Friends/neighbors   Assistance Needed iadls, driving PTA, ambulates with walker or cane as needed   Type of Residence Private residence   Number of Stairs to Enter Residence 2   Number of Stairs Within Residence 0   Do you have animals or pets at home? No   Home or Post Acute Services In home services   Type of Home Care Services Home OT;Home PT   Expected Discharge Disposition Home H   Does the patient need discharge transport arranged? No   Patient Choice   Provider Choice list and CMS website (https://medicare.gov/care-compare#search) for post-acute Quality and Resource Measure Data were provided and reviewed with: Other (Comment)  (decl.cined list)   Patient / Family choosing to utilize agency / facility established prior to hospitalization   ([facility])   This TCC met with patient and family at bedside, introduced self and explained role.  Demographic information and insurance verified.  Patient is from home alone, independent and driving PTA.  Ambulates with walker or cane, as needed.  Denies SW needs at this time.  Patient plans to return home at discharge.  Disposition pending PT/OT evals.  Patient's son is planned to provide transportation home.  Care Transitions will continue to follow.    12:45 pm addendum  Received message from resident, patient is ready for discharge.  PT/notes still pending, OT recommending low intensity therapy.  Patient's  preference is [facility].  Provider updated, orders placed.  This TCC phoned patient's son, and provided update, as OT recommending 24 hr support, as needed.  Son to update patient and plans to stay with patient tonight.  Provider updated.  Care Transitions will continue to follow.

## 2025-04-22 NOTE — CARE PLAN
The patient's goals for the shift include pain relief    The clinical goals for the shift include comfort      Problem: Pain - Adult  Goal: Verbalizes/displays adequate comfort level or baseline comfort level  Outcome: Progressing     Problem: Safety - Adult  Goal: Free from fall injury  Outcome: Progressing     Problem: Discharge Planning  Goal: Discharge to home or other facility with appropriate resources  Outcome: Progressing     Problem: Chronic Conditions and Co-morbidities  Goal: Patient's chronic conditions and co-morbidity symptoms are monitored and maintained or improved  Outcome: Progressing     Problem: Nutrition  Goal: Nutrient intake appropriate for maintaining nutritional needs  Outcome: Progressing     Problem: Pain  Goal: Takes deep breaths with improved pain control throughout the shift  Outcome: Progressing  Goal: Turns in bed with improved pain control throughout the shift  Outcome: Progressing  Goal: Walks with improved pain control throughout the shift  Outcome: Progressing  Goal: Performs ADL's with improved pain control throughout shift  Outcome: Progressing  Goal: Participates in PT with improved pain control throughout the shift  Outcome: Progressing  Goal: Free from opioid side effects throughout the shift  Outcome: Progressing  Goal: Free from acute confusion related to pain meds throughout the shift  Outcome: Progressing

## 2025-04-22 NOTE — PROGRESS NOTES
Occupational Therapy    Evaluation    Patient Name: Jing Bae  MRN: [id number]  Today's Date: 4/22/2025  Time Calculation  Start Time: 1031  Stop Time: 1055  Time Calculation (min): 24 min    Assessment  IP OT Assessment  OT Assessment: Patient presents with a decline in functional status and would benefit from 24 hour support with O.T. services at a low intensity to improve independence with ADLs, transfers & mobility.  Prognosis: Good  Barriers to Discharge Home: Caregiver assistance, Physical needs  Caregiver Assistance:  (Patient lives alone, son local/supportive)  Physical Needs: 24hr mobility assistance needed, 24hr ADL assistance needed  End of Session Patient Position: Up in chair, Alarm on (call light in reach)    Plan:  Treatment Interventions: ADL retraining, Functional transfer training, Neuromuscular reeducation, Compensatory technique education  OT Frequency: 3 times per week  OT Discharge Recommendations: Low intensity level of continued care (24 hour support for safety)  OT - OK to Discharge: Yes (from an O.T. standpoint)    Subjective     Current Problem:  Problem List[1]    General:  General  Reason for Referral: OT eval & treat for ADLs/safety (Lumbar strain)  Referred By: Blake Allen DO  Past Medical History Relevant to Rehab: 4/21/25: lumbar strain, low back pain, unable to get out of bed.   CT lumbar spine: degenerative changesno acute findings;  PMH: A fib, CAD, CKD, HTN, HLD, hypothyroidism, aortic stenosis, sinus bradycardia, MARTA, on CPAP,  RA, morbid obesity  Co-Treatment: PT  Co-Treatment Reason: To maximize patient safety & mobility  Prior to Session Communication: Bedside nurse  Patient Position Received: Bed, 2 rail up, Alarm on  General Comment: Patient cleared for therapy by nursing.    Precautions:  Precautions Comment: OOB with assist, fall/safety, bed alarm         Pain:  Pain Assessment  Pain Assessment: 0-10  0-10 (Numeric) Pain Score: 5 - Moderate pain  Pain Type:  Acute pain  Pain Location: Back  Pain Orientation: Lower, Left  Pain Interventions: Repositioned    Objective     Cognition:  Overall Cognitive Status: Within Functional Limits (mod cues for safety/hand placement)     Home Living:  Home Living Comments: Lives alone in one story condo with 1 step to enter. Independent ADLs, transfers & mobility with cane prn, used rollator for outings. Independent IADLs & driving; son local/supportive. Owns high toilet with grab bar, walk in shower with seat, grab bar & hand held shower.        ADL:  Grooming Assistance: Independent  UE Dressing Assistance: Stand by  LE Dressing Assistance: Moderate  LE Dressing Deficit:  (unable to reach LE's to don socks due to limited flexibility and pain; may benefit from adaptive equipment.  Patient reports wearing slip on sandals at home without socks at home)  Toileting Assistance with Device: Minimal    Activity Tolerance:  Endurance: Decreased tolerance for upright activites    Bed Mobility/Transfers:   Bed Mobility  Bed Mobility:  (min assist supine to sit with log rolling techs and rail)  Transfers  Transfer:  (CGA sit to stand from edge of bed; min assist sit to stand from low toilet with grab bar.)    Ambulation/Gait Training:  Functional Mobility  Functional Mobility Performed:  (CGA with wheeled walker)    Sitting Balance:  Dynamic Sitting Balance  Dynamic Sitting-Balance Support: Feet supported  Dynamic Sitting-Level of Assistance: Close supervision    Standing Balance:  Dynamic Standing Balance  Dynamic Standing-Balance Support: Bilateral upper extremity supported  Dynamic Standing-Level of Assistance: Contact guard    Sensation:  Light Touch: No apparent deficits    Strength:  Strength Comments: BUE grossly 4/5, WFL for ADLs    Coordination:  Movements are Fluid and Coordinated: Yes       Outcome Measures: [facility] Daily Activity  Putting on and taking off regular lower body clothing: A lot  Bathing (including washing, rinsing,  drying): A little  Putting on and taking off regular upper body clothing: None  Toileting, which includes using toilet, bedpan or urinal: A little  Taking care of personal grooming such as brushing teeth: None  Eating Meals: None  Daily Activity - Total Score: 20                    EDUCATION:     Education Documentation  ADL Training, taught by Diana Beaver OT at 4/22/2025 11:37 AM.  Learner: Patient  Readiness: Acceptance  Method: Explanation  Response: Verbalizes Understanding    Education Comments  No comments found.        Goals:   Encounter Problems       Encounter Problems (Active)       OT Goals       Increase LE bathing, dressing & toileting to supervision with adaptive equipment as needed.  (Progressing)       Start:  04/22/25    Expected End:  05/06/25            Increase bed mobility, functional transfers and functional mobility to/from bed, chair & commode to supervision with DME for safety  (Progressing)       Start:  04/22/25    Expected End:  05/06/25            Demonstrate compliance to body mechanics and safety techs during ADLs   (Progressing)       Start:  04/22/25    Expected End:  05/06/25                          [1]   Patient Active Problem List  Diagnosis    Paroxysmal atrial fibrillation (Multi)    Bradycardia    Chronic kidney disease (CKD)    Edema leg    Fatigue    Hypertension    Hypothyroidism    Interstitial lung disease (Multi)    Lower back pain    Class 2 obesity with body mass index (BMI) of 39.0 to 39.9 in adult    Osteoporosis    Pulmonary vascular congestion    Seronegative rheumatoid arthritis (Multi)    Thrombocytopenia (CMS-HCC)    Vaccine counseling    Chondrocalcinosis    Anemia    Mitral valve regurgitation    Aortic stenosis    Obstructive sleep apnea syndrome    Shortness of breath at rest    Arthralgia of hip    Left ventricular systolic dysfunction (LVSD)    Localized edema    Hyperlipidemia    Lung nodule    Candidiasis of skin    Coronary artery disease involving  native coronary artery of native heart without angina pectoris    Chest tightness    Chronic heart failure with preserved ejection fraction    Lumbar strain, initial encounter

## 2025-04-22 NOTE — HH CARE COORDINATION
Home Care received a Referral for Nursing, Physical Therapy, and Occupational Therapy. We have processed the referral for a Start of Care on 4/23-4/24.     If you have any questions or concerns, please feel free to contact us at [phone]. Follow the prompts, enter your five digit zip code, and you will be directed to your care team on [facility].

## 2025-04-23 ENCOUNTER — APPOINTMENT (OUTPATIENT)
Dept: RADIOLOGY | Facility: HOSPITAL | Age: 85
End: 2025-04-23
Payer: MEDICARE

## 2025-04-23 LAB
ALBUMIN SERPL BCP-MCNC: 4.1 G/DL (ref 3.4–5)
ANION GAP SERPL CALC-SCNC: 13 MMOL/L (ref 10–20)
APPEARANCE UR: CLEAR
BILIRUB UR STRIP.AUTO-MCNC: NEGATIVE MG/DL
BUN SERPL-MCNC: 34 MG/DL (ref 6–23)
CALCIUM SERPL-MCNC: 8.7 MG/DL (ref 8.6–10.3)
CHLORIDE SERPL-SCNC: 102 MMOL/L (ref 98–107)
CO2 SERPL-SCNC: 27 MMOL/L (ref 21–32)
COLOR UR: COLORLESS
CREAT SERPL-MCNC: 1.1 MG/DL (ref 0.5–1.05)
EGFRCR SERPLBLD CKD-EPI 2021: 50 ML/MIN/1.73M*2
ERYTHROCYTE [DISTWIDTH] IN BLOOD BY AUTOMATED COUNT: 15.1 % (ref 11.5–14.5)
GLUCOSE SERPL-MCNC: 85 MG/DL (ref 74–99)
GLUCOSE UR STRIP.AUTO-MCNC: NORMAL MG/DL
HCT VFR BLD AUTO: 39.5 % (ref 36–46)
HGB BLD-MCNC: 12.4 G/DL (ref 12–16)
KETONES UR STRIP.AUTO-MCNC: NEGATIVE MG/DL
LEUKOCYTE ESTERASE UR QL STRIP.AUTO: NEGATIVE
MAGNESIUM SERPL-MCNC: 1.92 MG/DL (ref 1.6–2.4)
MCH RBC QN AUTO: 31.2 PG (ref 26–34)
MCHC RBC AUTO-ENTMCNC: 31.4 G/DL (ref 32–36)
MCV RBC AUTO: 99 FL (ref 80–100)
MUCOUS THREADS #/AREA URNS AUTO: NORMAL /LPF
NITRITE UR QL STRIP.AUTO: NEGATIVE
NRBC BLD-RTO: 0 /100 WBCS (ref 0–0)
PH UR STRIP.AUTO: 5 [PH]
PHOSPHATE SERPL-MCNC: 4.3 MG/DL (ref 2.5–4.9)
PLATELET # BLD AUTO: 142 X10*3/UL (ref 150–450)
POTASSIUM SERPL-SCNC: 3.8 MMOL/L (ref 3.5–5.3)
PROT UR STRIP.AUTO-MCNC: NEGATIVE MG/DL
RBC # BLD AUTO: 3.98 X10*6/UL (ref 4–5.2)
RBC # UR STRIP.AUTO: ABNORMAL MG/DL
RBC #/AREA URNS AUTO: NORMAL /HPF
SODIUM SERPL-SCNC: 138 MMOL/L (ref 136–145)
SP GR UR STRIP.AUTO: 1
UROBILINOGEN UR STRIP.AUTO-MCNC: NORMAL MG/DL
WBC # BLD AUTO: 5.2 X10*3/UL (ref 4.4–11.3)
WBC #/AREA URNS AUTO: NORMAL /HPF

## 2025-04-23 PROCEDURE — 72148 MRI LUMBAR SPINE W/O DYE: CPT | Performed by: RADIOLOGY

## 2025-04-23 PROCEDURE — 2500000004 HC RX 250 GENERAL PHARMACY W/ HCPCS (ALT 636 FOR OP/ED)

## 2025-04-23 PROCEDURE — 85027 COMPLETE CBC AUTOMATED: CPT

## 2025-04-23 PROCEDURE — 83735 ASSAY OF MAGNESIUM: CPT

## 2025-04-23 PROCEDURE — 81001 URINALYSIS AUTO W/SCOPE: CPT

## 2025-04-23 PROCEDURE — 80069 RENAL FUNCTION PANEL: CPT

## 2025-04-23 PROCEDURE — 2500000005 HC RX 250 GENERAL PHARMACY W/O HCPCS: Performed by: EMERGENCY MEDICINE

## 2025-04-23 PROCEDURE — 2500000001 HC RX 250 WO HCPCS SELF ADMINISTERED DRUGS (ALT 637 FOR MEDICARE OP)

## 2025-04-23 PROCEDURE — 2500000002 HC RX 250 W HCPCS SELF ADMINISTERED DRUGS (ALT 637 FOR MEDICARE OP, ALT 636 FOR OP/ED): Performed by: INTERNAL MEDICINE

## 2025-04-23 PROCEDURE — 2500000001 HC RX 250 WO HCPCS SELF ADMINISTERED DRUGS (ALT 637 FOR MEDICARE OP): Performed by: INTERNAL MEDICINE

## 2025-04-23 PROCEDURE — 99233 SBSQ HOSP IP/OBS HIGH 50: CPT | Performed by: INTERNAL MEDICINE

## 2025-04-23 PROCEDURE — 2500000002 HC RX 250 W HCPCS SELF ADMINISTERED DRUGS (ALT 637 FOR MEDICARE OP, ALT 636 FOR OP/ED)

## 2025-04-23 PROCEDURE — G0378 HOSPITAL OBSERVATION PER HR: HCPCS

## 2025-04-23 PROCEDURE — 97535 SELF CARE MNGMENT TRAINING: CPT | Mod: GP,CQ

## 2025-04-23 PROCEDURE — 36415 COLL VENOUS BLD VENIPUNCTURE: CPT

## 2025-04-23 PROCEDURE — 72148 MRI LUMBAR SPINE W/O DYE: CPT

## 2025-04-23 RX ORDER — DOCUSATE SODIUM 100 MG/1
100 CAPSULE, LIQUID FILLED ORAL EVERY 12 HOURS PRN
Status: DISCONTINUED | OUTPATIENT
Start: 2025-04-23 | End: 2025-05-01 | Stop reason: HOSPADM

## 2025-04-23 RX ORDER — TAMSULOSIN HYDROCHLORIDE 0.4 MG/1
0.4 CAPSULE ORAL DAILY
Status: DISCONTINUED | OUTPATIENT
Start: 2025-04-23 | End: 2025-04-29

## 2025-04-23 RX ORDER — POLYETHYLENE GLYCOL 3350 17 G/17G
17 POWDER, FOR SOLUTION ORAL DAILY
Status: DISCONTINUED | OUTPATIENT
Start: 2025-04-23 | End: 2025-04-24

## 2025-04-23 RX ORDER — SULFASALAZINE 500 MG/1
1000 TABLET ORAL 2 TIMES DAILY
Status: DISCONTINUED | OUTPATIENT
Start: 2025-04-23 | End: 2025-05-01 | Stop reason: HOSPADM

## 2025-04-23 RX ADMIN — PANTOPRAZOLE SODIUM 40 MG: 40 TABLET, DELAYED RELEASE ORAL at 06:24

## 2025-04-23 RX ADMIN — LISINOPRIL 40 MG: 40 TABLET ORAL at 21:15

## 2025-04-23 RX ADMIN — AMLODIPINE BESYLATE 10 MG: 10 TABLET ORAL at 08:30

## 2025-04-23 RX ADMIN — LEVOTHYROXINE SODIUM 25 MCG: 25 TABLET ORAL at 21:14

## 2025-04-23 RX ADMIN — LIDOCAINE 1 PATCH: 4 PATCH TOPICAL at 08:32

## 2025-04-23 RX ADMIN — SULFASALAZINE 1000 MG: 500 TABLET ORAL at 21:15

## 2025-04-23 RX ADMIN — APIXABAN 5 MG: 5 TABLET, FILM COATED ORAL at 21:15

## 2025-04-23 RX ADMIN — ROSUVASTATIN CALCIUM 10 MG: 10 TABLET, FILM COATED ORAL at 08:30

## 2025-04-23 RX ADMIN — ACETAMINOPHEN 975 MG: 325 TABLET, FILM COATED ORAL at 22:55

## 2025-04-23 RX ADMIN — HYDROXYCHLOROQUINE SULFATE 200 MG: 200 TABLET ORAL at 10:30

## 2025-04-23 RX ADMIN — DOXAZOSIN 1 MG: 2 TABLET ORAL at 21:15

## 2025-04-23 RX ADMIN — CYCLOBENZAPRINE 5 MG: 10 TABLET, FILM COATED ORAL at 21:15

## 2025-04-23 RX ADMIN — METHYLPREDNISOLONE 16 MG: 4 TABLET ORAL at 12:11

## 2025-04-23 RX ADMIN — CYCLOBENZAPRINE 5 MG: 10 TABLET, FILM COATED ORAL at 08:48

## 2025-04-23 RX ADMIN — DOXAZOSIN 1 MG: 2 TABLET ORAL at 08:30

## 2025-04-23 RX ADMIN — OXYCODONE HYDROCHLORIDE 5 MG: 5 TABLET ORAL at 02:45

## 2025-04-23 RX ADMIN — CYCLOBENZAPRINE 5 MG: 10 TABLET, FILM COATED ORAL at 15:25

## 2025-04-23 RX ADMIN — TAMSULOSIN HYDROCHLORIDE 0.4 MG: 0.4 CAPSULE ORAL at 15:34

## 2025-04-23 RX ADMIN — POLYETHYLENE GLYCOL 3350 17 G: 17 POWDER, FOR SOLUTION ORAL at 21:15

## 2025-04-23 RX ADMIN — ACETAMINOPHEN 975 MG: 325 TABLET, FILM COATED ORAL at 10:29

## 2025-04-23 RX ADMIN — LISINOPRIL 40 MG: 40 TABLET ORAL at 08:30

## 2025-04-23 RX ADMIN — DOCUSATE SODIUM 100 MG: 100 CAPSULE, LIQUID FILLED ORAL at 08:26

## 2025-04-23 RX ADMIN — FUROSEMIDE 40 MG: 40 TABLET ORAL at 08:30

## 2025-04-23 RX ADMIN — APIXABAN 5 MG: 5 TABLET, FILM COATED ORAL at 08:30

## 2025-04-23 RX ADMIN — HYDROXYCHLOROQUINE SULFATE 200 MG: 200 TABLET ORAL at 22:55

## 2025-04-23 RX ADMIN — SULFASALAZINE 1000 MG: 500 TABLET ORAL at 12:11

## 2025-04-23 ASSESSMENT — COGNITIVE AND FUNCTIONAL STATUS - GENERAL
WALKING IN HOSPITAL ROOM: TOTAL
CLIMB 3 TO 5 STEPS WITH RAILING: TOTAL
MOVING FROM LYING ON BACK TO SITTING ON SIDE OF FLAT BED WITH BEDRAILS: A LOT
MOVING TO AND FROM BED TO CHAIR: TOTAL
STANDING UP FROM CHAIR USING ARMS: TOTAL
MOBILITY SCORE: 8
TURNING FROM BACK TO SIDE WHILE IN FLAT BAD: A LOT

## 2025-04-23 ASSESSMENT — PAIN SCALES - GENERAL
PAINLEVEL_OUTOF10: 4
PAINLEVEL_OUTOF10: 5 - MODERATE PAIN
PAINLEVEL_OUTOF10: 8
PAINLEVEL_OUTOF10: 10 - WORST POSSIBLE PAIN

## 2025-04-23 ASSESSMENT — PAIN - FUNCTIONAL ASSESSMENT
PAIN_FUNCTIONAL_ASSESSMENT: 0-10

## 2025-04-23 ASSESSMENT — PAIN SCALES - WONG BAKER
WONGBAKER_NUMERICALRESPONSE: HURTS WHOLE LOT
WONGBAKER_NUMERICALRESPONSE: HURTS WORST

## 2025-04-23 NOTE — PROGRESS NOTES
"Physical Therapy    Physical Therapy Treatment    Patient Name: Jing Bae  MRN: [id number]  Department: [facility]  Room: [facility]70-  Today's Date: 4/23/2025  Time Calculation  Start Time: 0958  Stop Time: 1027  Time Calculation (min): 29 min         Assessment/Plan   PT Assessment  Evaluation/Treatment Tolerance: Patient limited by pain  End of Session Communication: Bedside nurse  End of Session Patient Position: Bed, 3 rail up, Alarm on     PT Plan  Treatment/Interventions: Bed mobility, Transfer training, Gait training, Balance training, Strengthening, Endurance training, Therapeutic exercise, Therapeutic activity, Home exercise program, Positioning  PT Plan: Ongoing PT  PT Frequency: 3 times per week  PT Discharge Recommendations: Moderate intensity level of continued care (collaborated with evaluating PT)  PT - OK to Discharge: Yes      General Visit Information:   PT  Visit  PT Received On: 04/23/25  General  Prior to Session Communication: Bedside nurse, Physician  Patient Position Received: Bed, 3 rail up, Alarm off, not on at start of session  General Comment:  (pt agreeable to participate in therapy though tx session very limited by pain.  pt does appear very anxious and demos self-limiting behavior at times.)    Subjective   Precautions:  Precautions  Medical Precautions: Fall precautions  Precautions Comment: OOB with assist; purewick        Objective   Pain:  Pain Assessment  Pain Assessment: 0-10  0-10 (Numeric) Pain Score:  (\"20\")  Pain Type: Acute pain  Pain Location: Back  Pain Orientation: Left, Lower  Pain Interventions:  (cold packs applied both pre and post tx session.  DBT and guided imagery also discussed.)    Cognition:  Cognition  Overall Cognitive Status: Within Functional Limits     Treatments:  Bed Mobility  Bed Mobility:  (sup <> sit with max A x 2 via log roll technique.  cuing for sequencing; increased time needed to complete.  pt able to sit EOB </=5 min. however requiring " continual max A x 1-2 to maintain static sitting balance 2/2 heavy posterior lean.  repeated cuing for anterior wt shift though pt with much difficulty achieving, even when utilizing BUEs on walker to assist.  after returning supine, pt also able to roll R/L with max A x 1 for linens to be straightened out; bedrails used to assist.)     Transfers  Transfer:  (unable to progress at this time d/t poor sitting balance / tolerance.)    Outcome Measures:  [facility] Basic Mobility  Turning from your back to your side while in a flat bed without using bedrails: A lot  Moving from lying on your back to sitting on the side of a flat bed without using bedrails: A lot  Moving to and from bed to chair (including a wheelchair): Total  Standing up from a chair using your arms (e.g. wheelchair or bedside chair): Total  To walk in hospital room: Total  Climbing 3-5 steps with railing: Total  Basic Mobility - Total Score: 8    Education Documentation  Precautions, taught by Paige Uribe PTA at 4/23/2025 11:31 AM.  Learner: Patient  Readiness: Acceptance  Method: Explanation  Response: Verbalizes Understanding, Needs Reinforcement    Mobility Training, taught by Paige Uribe PTA at 4/23/2025 11:31 AM.  Learner: Patient  Readiness: Acceptance  Method: Explanation  Response: Verbalizes Understanding, Needs Reinforcement    Education Comments  No comments found.        EDUCATION:       Encounter Problems       Encounter Problems (Active)       PT Problem       PT Goal 1 STG - Pt will transition supine <> sitting with MOD I  (Progressing)       Start:  04/22/25    Expected End:  05/06/25            PT Goal 2 STG - Pt will transfer STS with MOD I  (Progressing)       Start:  04/22/25    Expected End:  05/06/25            PT Goal 3 STG - Pt will amb 75' using FWW with MOD I  (Progressing)       Start:  04/22/25    Expected End:  05/06/25            PT Goal 4 STG - Pt will perform a B LE ther ex program of 2-3 sets of 10  (Progressing)        Start:  04/22/25    Expected End:  05/06/25

## 2025-04-23 NOTE — NURSING NOTE
Manual BP performed (180/80)- automatic SBP reading over 200.  Patient at baseline for pain, moderately anxious d/t elevated BP and other concerns with medications.  Patient denies chest pains, dizziness or any other cardiac symptoms.  Patients needs met and made comfortable.

## 2025-04-23 NOTE — PROGRESS NOTES
04/23/25 1256   Discharge Planning   Home or Post Acute Services Post acute facilities (Rehab/SNF/etc)   Type of Post Acute Facility Services Skilled nursing   Expected Discharge Disposition SNF   Does the patient need discharge transport arranged? Yes   RoundTrip coordination needed? Yes   Has discharge transport been arranged? No   Intensity of Service   Intensity of Service 0-30 min     TCC received message from PT that patient is now requiring Mod intensity therapy.  AMPAC for PT reviewed and AMPAC is 8.  MD updated and SNF list printed for patient.  Met with patient and son at bedside to discuss therapy recommendations.  Discussed SNF and recommendations.  Patient and son agreeable with SNF.  Choice list provided.  Care transitions to follow for recommendations.      1350pm: Received phone call from patient's son TJ with choices for SNF.  Choices include: 1. [facility], 2. [place], 3. [place].  DSC contacted to place new SNF referrals.

## 2025-04-23 NOTE — CARE PLAN
The patient's goals for the shift include pain relief    The clinical goals for the shift include pain management      Problem: Pain - Adult  Goal: Verbalizes/displays adequate comfort level or baseline comfort level  Outcome: Progressing     Problem: Safety - Adult  Goal: Free from fall injury  Outcome: Progressing     Problem: Discharge Planning  Goal: Discharge to home or other facility with appropriate resources  Outcome: Progressing     Problem: Chronic Conditions and Co-morbidities  Goal: Patient's chronic conditions and co-morbidity symptoms are monitored and maintained or improved  Outcome: Progressing     Problem: Nutrition  Goal: Nutrient intake appropriate for maintaining nutritional needs  Outcome: Progressing     Problem: Pain  Goal: Takes deep breaths with improved pain control throughout the shift  Outcome: Progressing  Goal: Turns in bed with improved pain control throughout the shift  Outcome: Progressing  Goal: Walks with improved pain control throughout the shift  Outcome: Progressing  Goal: Performs ADL's with improved pain control throughout shift  Outcome: Progressing  Goal: Participates in PT with improved pain control throughout the shift  Outcome: Progressing  Goal: Free from opioid side effects throughout the shift  Outcome: Progressing  Goal: Free from acute confusion related to pain meds throughout the shift  Outcome: Progressing

## 2025-04-23 NOTE — PROGRESS NOTES
Internal Medicine Progress Note         Jing Bae is a 84 y.o. female on day 0 of admission presenting with Lumbar strain, initial encounter.    SUBJECTIVE    Patient was seen and examined at bedside.  Yesterday patient worked with physical therapy in the morning and when I saw her at bedside in the morning she was able to sit up in bed without assistance.  However in the afternoon when patient was again try to attempt to get out of bed she had strained her back causing her to have severe pain requiring to nursing staff members to help patient with ambulation.  Given the patient had initially received an AM-PAC of 20 and was scheduled to be discharged home due to her acute change in clinical status discharge was held until she was reevaluated with physical therapy this morning and seen by social work.  This morning patient endorses still in pain having difficulty with physical activity and not taking any muscle relaxants overnight despite being offered as a medication for pain control.      OBJECTIVE    Vitals:    04/23/25 0300 04/23/25 0408 04/23/25 0716 04/23/25 0837   BP:  180/80  160/90   BP Location:       Patient Position:       Pulse: 84  71    Resp: 18  18    Temp: 36.7 °C (98.1 °F)  36.5 °C (97.7 °F)    TempSrc: Temporal  Temporal    SpO2: 94%  92%    Weight:       Height:          Results from last 7 days   Lab Units 04/23/25  0525 04/22/25  0553 04/21/25  1217   WBC AUTO x10*3/uL 5.2   < > 5.2   HEMOGLOBIN g/dL 12.4   < > 12.9   HEMATOCRIT % 39.5   < > 42.7   PLATELETS AUTO x10*3/uL 142*   < > 113*   NEUTROS PCT AUTO %  --   --  76.8   LYMPHS PCT AUTO %  --   --  13.1   MONOS PCT AUTO %  --   --  8.3   EOS PCT AUTO %  --   --  1.0    < > = values in this interval not displayed.     Results from last 7 days   Lab Units 04/23/25  0525 04/21/25  1357 04/21/25  1217   SODIUM mmol/L 138   < > 136   POTASSIUM mmol/L 3.8   < > 7.3*    CHLORIDE mmol/L 102   < > 104   CO2 mmol/L 27   < > 23   BUN mg/dL 34*   < > 33*   CREATININE mg/dL 1.10*   < > 1.16*   CALCIUM mg/dL 8.7   < > 9.2   PROTEIN TOTAL g/dL  --   --  7.8   BILIRUBIN TOTAL mg/dL  --   --  0.7   ALK PHOS U/L  --   --  65   ALT U/L  --   --  15   AST U/L  --   --  57*   GLUCOSE mg/dL 85   < > 117*    < > = values in this interval not displayed.       Scheduled Medications  Scheduled Medications[1]       Physical Exam  Physical Exam:  General:  Pleasant and cooperative.  Obese female.  Agitated however answering questions appropriately  HEENT:  Normocephalic, atraumatic, mucus membranes moist.   Neck:  Trachea midline.  No JVD.    Chest:  Clear to auscultation bilaterally. No wheezes, rales, or rhonchi.  CV:  Regular rate and rhythm.  Positive S1/S2.   Abdomen: Bowel sounds present in all four quadrants, abdomen is soft, non-tender, non-distended.  Extremities:  No lower extremity edema or cyanosis.   Neurological:  AAOx3. No focal deficits.  Skin:  Warm and dry.                 ASSESSMENT & PLAN    Lumbosacral strain  Paraspinal muscle tenderness  Morbid obesity  Ambulatory dysfunction secondary to above  History of rheumatoid arthritis  History of chronic degenerative joint disease    PLAN:  - Repeat PT OT testing showed AM-PAC 8.  Antibiotic day prior was 20  - I again recommended patient trial Flexeril, Tylenol, as needed ibuprofen for pain control  - I emphasized the importance of trying to get out of bed and maintain some level of physical activity as lack of physical activity could further worsen her lumbosacral strain.  - Continue with Medrol Dosepak  - Continue patient's home medications for rheumatoid arthritis  - MRI ordered to get a baseline of her degenerative joint disease of the lower back  - Referral to neurosurgery in the outpatient setting      CKD stage III     PLAN:  - Monitor patient's creatinine.  Currently at baseline.  - Toradol and NSAIDs as needed while patient's  creatinine is at baseline.     CAD  A-fib on Eliquis  Hypertension  Hyperlipidemia  PLAN:  - Continue anticoagulation  - Continue amlodipine and lisinopril  - Continue diuretic  - Continue statin     Chronic Conditions  Hypothyroidism-continue Synthroid  MARTA-continue CPAP        DVT ppx: Eliquis  GI ppx: Protonix  IVF: None  Diet: Regular diet  Consults: PT/OT  CODE STATUS: Full code    Blake Allen, DO   Please SecureChat for any further questions  This is a preliminary note, please await attending attestation for final A/P          [1] acetaminophen, 975 mg, oral, BID  amLODIPine, 10 mg, oral, q AM  apixaban, 5 mg, oral, BID  doxazosin, 1 mg, oral, BID  furosemide, 40 mg, oral, Daily  hydroxychloroquine, 200 mg, oral, q12h  levothyroxine, 25 mcg, oral, Nightly  lidocaine, 1 patch, transdermal, Daily  lisinopril, 40 mg, oral, BID  methylPREDNISolone, 16 mg, oral, Once   Followed by  [START ON 4/24/2025] methylPREDNISolone, 12 mg, oral, Once   Followed by  [START ON 4/25/2025] methylPREDNISolone, 8 mg, oral, Once   Followed by  [START ON 4/26/2025] methylPREDNISolone, 4 mg, oral, Once  pantoprazole, 40 mg, oral, Daily before breakfast  rosuvastatin, 10 mg, oral, Daily  sulfaSALAzine, 1,000 mg, oral, BID

## 2025-04-23 NOTE — PROGRESS NOTES
Occupational Therapy                     Therapy Communication Note    Patient Name: Jing Bae  MRN: [id number]  Department: [facility]  Room: 70/706-A  Today's Date: 4/23/2025     Discipline: Occupational Therapy    OT Missed Visit: Yes     Missed Visit Reason:  (Defer O.T. this date as patient reporting severe pain lower abdomen and back; RN reports patient scheduled for MRI this date; Per EMR, patient requiring increased assist for all care due to pain and will not be able to manage at home alone. Anticipate patient would benefit from OT at a moderate intensity to promote increased independence with ADLs & functional transfers.)    Missed Time: Attempt

## 2025-04-24 ENCOUNTER — DOCUMENTATION (OUTPATIENT)
Dept: HOME HEALTH SERVICES | Facility: HOME HEALTH | Age: 85
End: 2025-04-24
Payer: MEDICARE

## 2025-04-24 LAB
ALBUMIN SERPL BCP-MCNC: 4 G/DL (ref 3.4–5)
ANION GAP SERPL CALC-SCNC: 10 MMOL/L (ref 10–20)
BUN SERPL-MCNC: 30 MG/DL (ref 6–23)
CALCIUM SERPL-MCNC: 9 MG/DL (ref 8.6–10.3)
CHLORIDE SERPL-SCNC: 103 MMOL/L (ref 98–107)
CO2 SERPL-SCNC: 29 MMOL/L (ref 21–32)
CREAT SERPL-MCNC: 1.09 MG/DL (ref 0.5–1.05)
EGFRCR SERPLBLD CKD-EPI 2021: 51 ML/MIN/1.73M*2
ERYTHROCYTE [DISTWIDTH] IN BLOOD BY AUTOMATED COUNT: 14.6 % (ref 11.5–14.5)
GLUCOSE SERPL-MCNC: 88 MG/DL (ref 74–99)
HCT VFR BLD AUTO: 41.8 % (ref 36–46)
HGB BLD-MCNC: 12.8 G/DL (ref 12–16)
HOLD SPECIMEN: NORMAL
MAGNESIUM SERPL-MCNC: 2.12 MG/DL (ref 1.6–2.4)
MCH RBC QN AUTO: 31.2 PG (ref 26–34)
MCHC RBC AUTO-ENTMCNC: 30.6 G/DL (ref 32–36)
MCV RBC AUTO: 102 FL (ref 80–100)
NRBC BLD-RTO: 0 /100 WBCS (ref 0–0)
PHOSPHATE SERPL-MCNC: 4.3 MG/DL (ref 2.5–4.9)
PLATELET # BLD AUTO: 143 X10*3/UL (ref 150–450)
POTASSIUM SERPL-SCNC: 3.7 MMOL/L (ref 3.5–5.3)
RBC # BLD AUTO: 4.1 X10*6/UL (ref 4–5.2)
SODIUM SERPL-SCNC: 138 MMOL/L (ref 136–145)
WBC # BLD AUTO: 6.4 X10*3/UL (ref 4.4–11.3)

## 2025-04-24 PROCEDURE — 2500000001 HC RX 250 WO HCPCS SELF ADMINISTERED DRUGS (ALT 637 FOR MEDICARE OP)

## 2025-04-24 PROCEDURE — 80069 RENAL FUNCTION PANEL: CPT

## 2025-04-24 PROCEDURE — 83735 ASSAY OF MAGNESIUM: CPT

## 2025-04-24 PROCEDURE — 99232 SBSQ HOSP IP/OBS MODERATE 35: CPT | Performed by: INTERNAL MEDICINE

## 2025-04-24 PROCEDURE — 36415 COLL VENOUS BLD VENIPUNCTURE: CPT

## 2025-04-24 PROCEDURE — 2500000004 HC RX 250 GENERAL PHARMACY W/ HCPCS (ALT 636 FOR OP/ED)

## 2025-04-24 PROCEDURE — 2500000005 HC RX 250 GENERAL PHARMACY W/O HCPCS: Performed by: EMERGENCY MEDICINE

## 2025-04-24 PROCEDURE — G0378 HOSPITAL OBSERVATION PER HR: HCPCS

## 2025-04-24 PROCEDURE — 2500000001 HC RX 250 WO HCPCS SELF ADMINISTERED DRUGS (ALT 637 FOR MEDICARE OP): Performed by: INTERNAL MEDICINE

## 2025-04-24 PROCEDURE — 2500000002 HC RX 250 W HCPCS SELF ADMINISTERED DRUGS (ALT 637 FOR MEDICARE OP, ALT 636 FOR OP/ED)

## 2025-04-24 PROCEDURE — 85027 COMPLETE CBC AUTOMATED: CPT

## 2025-04-24 PROCEDURE — 2500000002 HC RX 250 W HCPCS SELF ADMINISTERED DRUGS (ALT 637 FOR MEDICARE OP, ALT 636 FOR OP/ED): Performed by: INTERNAL MEDICINE

## 2025-04-24 PROCEDURE — 2500000001 HC RX 250 WO HCPCS SELF ADMINISTERED DRUGS (ALT 637 FOR MEDICARE OP): Performed by: STUDENT IN AN ORGANIZED HEALTH CARE EDUCATION/TRAINING PROGRAM

## 2025-04-24 PROCEDURE — 51701 INSERT BLADDER CATHETER: CPT

## 2025-04-24 RX ORDER — NYSTATIN 100000 [USP'U]/G
1 POWDER TOPICAL 2 TIMES DAILY
Status: DISCONTINUED | OUTPATIENT
Start: 2025-04-24 | End: 2025-05-01 | Stop reason: HOSPADM

## 2025-04-24 RX ORDER — POLYETHYLENE GLYCOL 3350 17 G/17G
17 POWDER, FOR SOLUTION ORAL DAILY
Status: DISCONTINUED | OUTPATIENT
Start: 2025-04-24 | End: 2025-05-01 | Stop reason: HOSPADM

## 2025-04-24 RX ADMIN — LIDOCAINE 1 PATCH: 4 PATCH TOPICAL at 08:37

## 2025-04-24 RX ADMIN — OXYCODONE HYDROCHLORIDE 5 MG: 5 TABLET ORAL at 21:15

## 2025-04-24 RX ADMIN — CYCLOBENZAPRINE 5 MG: 10 TABLET, FILM COATED ORAL at 23:17

## 2025-04-24 RX ADMIN — FUROSEMIDE 40 MG: 40 TABLET ORAL at 08:37

## 2025-04-24 RX ADMIN — LEVOTHYROXINE SODIUM 25 MCG: 25 TABLET ORAL at 21:51

## 2025-04-24 RX ADMIN — HYDROXYCHLOROQUINE SULFATE 200 MG: 200 TABLET ORAL at 23:17

## 2025-04-24 RX ADMIN — OXYCODONE HYDROCHLORIDE 5 MG: 5 TABLET ORAL at 03:43

## 2025-04-24 RX ADMIN — ROSUVASTATIN CALCIUM 10 MG: 10 TABLET, FILM COATED ORAL at 08:37

## 2025-04-24 RX ADMIN — DOXAZOSIN 1 MG: 2 TABLET ORAL at 08:37

## 2025-04-24 RX ADMIN — ACETAMINOPHEN 975 MG: 325 TABLET, FILM COATED ORAL at 11:18

## 2025-04-24 RX ADMIN — DOXAZOSIN 1 MG: 2 TABLET ORAL at 21:15

## 2025-04-24 RX ADMIN — HYDROXYCHLOROQUINE SULFATE 200 MG: 200 TABLET ORAL at 11:18

## 2025-04-24 RX ADMIN — SULFASALAZINE 1000 MG: 500 TABLET ORAL at 21:15

## 2025-04-24 RX ADMIN — NYSTATIN 1 APPLICATION: 100000 POWDER TOPICAL at 23:17

## 2025-04-24 RX ADMIN — ACETAMINOPHEN 975 MG: 325 TABLET, FILM COATED ORAL at 23:17

## 2025-04-24 RX ADMIN — APIXABAN 5 MG: 5 TABLET, FILM COATED ORAL at 21:15

## 2025-04-24 RX ADMIN — NYSTATIN 1 APPLICATION: 100000 POWDER TOPICAL at 08:37

## 2025-04-24 RX ADMIN — TAMSULOSIN HYDROCHLORIDE 0.4 MG: 0.4 CAPSULE ORAL at 08:38

## 2025-04-24 RX ADMIN — LISINOPRIL 40 MG: 40 TABLET ORAL at 08:37

## 2025-04-24 RX ADMIN — METHYLPREDNISOLONE 12 MG: 4 TABLET ORAL at 13:11

## 2025-04-24 RX ADMIN — PANTOPRAZOLE SODIUM 40 MG: 40 TABLET, DELAYED RELEASE ORAL at 06:18

## 2025-04-24 RX ADMIN — CYCLOBENZAPRINE 5 MG: 10 TABLET, FILM COATED ORAL at 16:06

## 2025-04-24 RX ADMIN — APIXABAN 5 MG: 5 TABLET, FILM COATED ORAL at 08:37

## 2025-04-24 RX ADMIN — POLYETHYLENE GLYCOL 3350 17 G: 17 POWDER, FOR SOLUTION ORAL at 08:37

## 2025-04-24 RX ADMIN — AMLODIPINE BESYLATE 10 MG: 10 TABLET ORAL at 08:37

## 2025-04-24 RX ADMIN — DOCUSATE SODIUM 100 MG: 100 CAPSULE, LIQUID FILLED ORAL at 21:22

## 2025-04-24 RX ADMIN — LISINOPRIL 40 MG: 40 TABLET ORAL at 21:15

## 2025-04-24 RX ADMIN — SULFASALAZINE 1000 MG: 500 TABLET ORAL at 08:38

## 2025-04-24 ASSESSMENT — PAIN SCALES - GENERAL
PAINLEVEL_OUTOF10: 7
PAINLEVEL_OUTOF10: 10 - WORST POSSIBLE PAIN
PAINLEVEL_OUTOF10: 0 - NO PAIN
PAINLEVEL_OUTOF10: 3
PAINLEVEL_OUTOF10: 5 - MODERATE PAIN

## 2025-04-24 ASSESSMENT — COGNITIVE AND FUNCTIONAL STATUS - GENERAL
TOILETING: A LITTLE
WALKING IN HOSPITAL ROOM: A LITTLE
DAILY ACTIVITIY SCORE: 23
CLIMB 3 TO 5 STEPS WITH RAILING: A LITTLE
MOBILITY SCORE: 22
MOBILITY SCORE: 22
WALKING IN HOSPITAL ROOM: A LITTLE
CLIMB 3 TO 5 STEPS WITH RAILING: A LITTLE

## 2025-04-24 ASSESSMENT — PAIN - FUNCTIONAL ASSESSMENT
PAIN_FUNCTIONAL_ASSESSMENT: 0-10

## 2025-04-24 NOTE — PROGRESS NOTES
Social work consult placed for discharge planning. SW reviewed pt's chart. No SW needs foreseen at this time. SW signing off; available upon request.

## 2025-04-24 NOTE — CARE PLAN
The patient's goals for the shift include pain relief    The clinical goals for the shift include pt to remain free from falls until end of shift      Problem: Pain - Adult  Goal: Verbalizes/displays adequate comfort level or baseline comfort level  Outcome: Progressing     Problem: Safety - Adult  Goal: Free from fall injury  Outcome: Progressing     Problem: Discharge Planning  Goal: Discharge to home or other facility with appropriate resources  Outcome: Progressing     Problem: Chronic Conditions and Co-morbidities  Goal: Patient's chronic conditions and co-morbidity symptoms are monitored and maintained or improved  Outcome: Progressing     Problem: Nutrition  Goal: Nutrient intake appropriate for maintaining nutritional needs  Outcome: Progressing     Problem: Pain  Goal: Takes deep breaths with improved pain control throughout the shift  Outcome: Progressing  Goal: Turns in bed with improved pain control throughout the shift  Outcome: Progressing  Goal: Walks with improved pain control throughout the shift  Outcome: Progressing  Goal: Performs ADL's with improved pain control throughout shift  Outcome: Progressing  Goal: Participates in PT with improved pain control throughout the shift  Outcome: Progressing  Goal: Free from opioid side effects throughout the shift  Outcome: Progressing  Goal: Free from acute confusion related to pain meds throughout the shift  Outcome: Progressing

## 2025-04-24 NOTE — HH CARE COORDINATION
Home Care received a referral for Nursing, Physical Therapy, and Occupational Therapy. Unfortunately, we are unable to accept and process the referral at this time.    Reason:  DC to SNF    Patients, please reach out to the referring provider or your PCP to assist in obtaining an alternative home care agency and/or guidance to meet your needs.    Providers, please reach out to  Home Care at [phone] with any questions regarding the declined referral.

## 2025-04-24 NOTE — PROGRESS NOTES
Spoke with patient at bedside. FOC is [facility]. Informed facility. Requested [facility] precert team start precert.

## 2025-04-24 NOTE — PROGRESS NOTES
Internal Medicine Progress Note         Jing Bae is a 84 y.o. female on day 0 of admission presenting with Lumbar strain, initial encounter.    SUBJECTIVE    Patient notes her pain is better controlled today.  Yesterday she was having episodes of urinary retention needing straight catheterization.  UA was unremarkable.  Emphasized importance of trying to ambulate to the best of her ability.      OBJECTIVE    Vitals:    04/23/25 1949 04/24/25 0254 04/24/25 0315 04/24/25 0758   BP: 142/70 (!) 184/89 166/86 (!) 185/88   BP Location: Right arm Right arm Left arm Right arm   Patient Position: Lying Lying  Lying   Pulse: 73 59  69   Resp: 17 20  20   Temp: 36.5 °C (97.7 °F) 35.5 °C (95.9 °F)  36.6 °C (97.9 °F)   TempSrc: Temporal Temporal  Temporal   SpO2: 92% 92%  92%   Weight:       Height:          Results from last 7 days   Lab Units 04/24/25  0548 04/22/25  0553 04/21/25  1217   WBC AUTO x10*3/uL 6.4   < > 5.2   HEMOGLOBIN g/dL 12.8   < > 12.9   HEMATOCRIT % 41.8   < > 42.7   PLATELETS AUTO x10*3/uL 143*   < > 113*   NEUTROS PCT AUTO %  --   --  76.8   LYMPHS PCT AUTO %  --   --  13.1   MONOS PCT AUTO %  --   --  8.3   EOS PCT AUTO %  --   --  1.0    < > = values in this interval not displayed.     Results from last 7 days   Lab Units 04/24/25  0548 04/21/25  1357 04/21/25  1217   SODIUM mmol/L 138   < > 136   POTASSIUM mmol/L 3.7   < > 7.3*   CHLORIDE mmol/L 103   < > 104   CO2 mmol/L 29   < > 23   BUN mg/dL 30*   < > 33*   CREATININE mg/dL 1.09*   < > 1.16*   CALCIUM mg/dL 9.0   < > 9.2   PROTEIN TOTAL g/dL  --   --  7.8   BILIRUBIN TOTAL mg/dL  --   --  0.7   ALK PHOS U/L  --   --  65   ALT U/L  --   --  15   AST U/L  --   --  57*   GLUCOSE mg/dL 88   < > 117*    < > = values in this interval not displayed.       Scheduled Medications  Scheduled Medications[1]       Physical Exam  Physical Exam:  General:  Pleasant and cooperative.  Obese  female.  Agitated however answering questions appropriately  HEENT:  Normocephalic, atraumatic, mucus membranes moist.   Neck:  Trachea midline.  No JVD.    Chest:  Clear to auscultation bilaterally. No wheezes, rales, or rhonchi.  CV:  Regular rate and rhythm.  Positive S1/S2.   Abdomen: Bowel sounds present in all four quadrants, abdomen is soft, non-tender, non-distended.  Extremities:  No lower extremity edema or cyanosis.   Neurological:  AAOx3. No focal deficits.  Skin:  Warm and dry.                 ASSESSMENT & PLAN    Lumbosacral strain  Paraspinal muscle tenderness  Morbid obesity  Ambulatory dysfunction secondary to above  History of rheumatoid arthritis  History of chronic degenerative joint disease    PLAN:  - Repeat PT OT testing showed AM-PAC 8.    - I again recommended patient trial Flexeril, Tylenol, as needed ibuprofen for pain control  - I emphasized the importance of trying to get out of bed and maintain some level of physical activity as lack of physical activity could further worsen her lumbosacral strain.  - Continue with Medrol Dosepak  - Continue patient's home medications for rheumatoid arthritis  - MRI showed degenerative changes in lower back.  Moderate spinal stenosis, narrowing of the subarticular recess and moderate bilateral neural chaney stenosis  - Referral to neurosurgery in the outpatient setting      CKD stage III  Acute urinary retention     PLAN:  - Monitor patient's creatinine.  Currently at baseline.  - Toradol and NSAIDs as needed while patient's creatinine is at baseline.  - Patient had 800 cc after voiding.  She was straight cathed x 1.  Monitor PVR and encourage ambulation.  - Flomax added.     CAD  A-fib on Eliquis  Hypertension  Hyperlipidemia  PLAN:  - Continue anticoagulation  - Continue amlodipine and lisinopril  - Continue diuretic  - Continue statin     Chronic Conditions  Hypothyroidism-continue Synthroid  MARTA-continue CPAP        DVT ppx: Eliquis  GI ppx:  Protonix  IVF: None  Diet: Regular diet  Consults: PT/OT  CODE STATUS: Full code  Dispo: Awaiting acceptance from a rehab facility prior to discharge.    Blake Allen, DO   Please SecureChat for any further questions  This is a preliminary note, please await attending attestation for final A/P          [1] acetaminophen, 975 mg, oral, BID  amLODIPine, 10 mg, oral, q AM  apixaban, 5 mg, oral, BID  doxazosin, 1 mg, oral, BID  furosemide, 40 mg, oral, Daily  hydroxychloroquine, 200 mg, oral, q12h  levothyroxine, 25 mcg, oral, Nightly  lidocaine, 1 patch, transdermal, Daily  lisinopril, 40 mg, oral, BID  methylPREDNISolone, 12 mg, oral, Once   Followed by  [START ON 4/25/2025] methylPREDNISolone, 8 mg, oral, Once   Followed by  [START ON 4/26/2025] methylPREDNISolone, 4 mg, oral, Once  nystatin, 1 Application, Topical, BID  pantoprazole, 40 mg, oral, Daily before breakfast  polyethylene glycol, 17 g, oral, Daily  rosuvastatin, 10 mg, oral, Daily  sulfaSALAzine, 1,000 mg, oral, BID  tamsulosin, 0.4 mg, oral, Daily

## 2025-04-24 NOTE — CARE PLAN
Problem: Safety - Adult  Goal: Free from fall injury  Outcome: Progressing       The clinical goals for the shift include pt to remain free from falls until end of shift

## 2025-04-25 LAB
ALBUMIN SERPL BCP-MCNC: 4.3 G/DL (ref 3.4–5)
ANION GAP SERPL CALC-SCNC: 11 MMOL/L (ref 10–20)
BUN SERPL-MCNC: 27 MG/DL (ref 6–23)
CALCIUM SERPL-MCNC: 9.3 MG/DL (ref 8.6–10.3)
CHLORIDE SERPL-SCNC: 102 MMOL/L (ref 98–107)
CO2 SERPL-SCNC: 30 MMOL/L (ref 21–32)
CREAT SERPL-MCNC: 1.16 MG/DL (ref 0.5–1.05)
EGFRCR SERPLBLD CKD-EPI 2021: 47 ML/MIN/1.73M*2
ERYTHROCYTE [DISTWIDTH] IN BLOOD BY AUTOMATED COUNT: 14.6 % (ref 11.5–14.5)
GLUCOSE SERPL-MCNC: 95 MG/DL (ref 74–99)
HCT VFR BLD AUTO: 44.7 % (ref 36–46)
HGB BLD-MCNC: 13.6 G/DL (ref 12–16)
MAGNESIUM SERPL-MCNC: 2.14 MG/DL (ref 1.6–2.4)
MCH RBC QN AUTO: 30.8 PG (ref 26–34)
MCHC RBC AUTO-ENTMCNC: 30.4 G/DL (ref 32–36)
MCV RBC AUTO: 101 FL (ref 80–100)
NRBC BLD-RTO: 0 /100 WBCS (ref 0–0)
PHOSPHATE SERPL-MCNC: 4 MG/DL (ref 2.5–4.9)
PLATELET # BLD AUTO: 156 X10*3/UL (ref 150–450)
POTASSIUM SERPL-SCNC: 3.9 MMOL/L (ref 3.5–5.3)
RBC # BLD AUTO: 4.41 X10*6/UL (ref 4–5.2)
SODIUM SERPL-SCNC: 139 MMOL/L (ref 136–145)
WBC # BLD AUTO: 6 X10*3/UL (ref 4.4–11.3)

## 2025-04-25 PROCEDURE — G0378 HOSPITAL OBSERVATION PER HR: HCPCS

## 2025-04-25 PROCEDURE — 97116 GAIT TRAINING THERAPY: CPT | Mod: GP,CQ

## 2025-04-25 PROCEDURE — 99232 SBSQ HOSP IP/OBS MODERATE 35: CPT | Performed by: INTERNAL MEDICINE

## 2025-04-25 PROCEDURE — 2500000001 HC RX 250 WO HCPCS SELF ADMINISTERED DRUGS (ALT 637 FOR MEDICARE OP): Performed by: STUDENT IN AN ORGANIZED HEALTH CARE EDUCATION/TRAINING PROGRAM

## 2025-04-25 PROCEDURE — 36415 COLL VENOUS BLD VENIPUNCTURE: CPT

## 2025-04-25 PROCEDURE — 2500000004 HC RX 250 GENERAL PHARMACY W/ HCPCS (ALT 636 FOR OP/ED)

## 2025-04-25 PROCEDURE — 97535 SELF CARE MNGMENT TRAINING: CPT | Mod: GP,CQ

## 2025-04-25 PROCEDURE — 83735 ASSAY OF MAGNESIUM: CPT

## 2025-04-25 PROCEDURE — 2500000002 HC RX 250 W HCPCS SELF ADMINISTERED DRUGS (ALT 637 FOR MEDICARE OP, ALT 636 FOR OP/ED): Performed by: INTERNAL MEDICINE

## 2025-04-25 PROCEDURE — 85027 COMPLETE CBC AUTOMATED: CPT

## 2025-04-25 PROCEDURE — 2500000002 HC RX 250 W HCPCS SELF ADMINISTERED DRUGS (ALT 637 FOR MEDICARE OP, ALT 636 FOR OP/ED)

## 2025-04-25 PROCEDURE — 80069 RENAL FUNCTION PANEL: CPT

## 2025-04-25 PROCEDURE — 2500000005 HC RX 250 GENERAL PHARMACY W/O HCPCS: Performed by: EMERGENCY MEDICINE

## 2025-04-25 PROCEDURE — 2500000001 HC RX 250 WO HCPCS SELF ADMINISTERED DRUGS (ALT 637 FOR MEDICARE OP)

## 2025-04-25 RX ORDER — MAGNESIUM HYDROXIDE 2400 MG/10ML
10 SUSPENSION ORAL DAILY PRN
Status: DISCONTINUED | OUTPATIENT
Start: 2025-04-25 | End: 2025-05-01 | Stop reason: HOSPADM

## 2025-04-25 RX ORDER — HYDROXYZINE HYDROCHLORIDE 10 MG/1
10 TABLET, FILM COATED ORAL EVERY 8 HOURS PRN
Status: DISCONTINUED | OUTPATIENT
Start: 2025-04-25 | End: 2025-05-01 | Stop reason: HOSPADM

## 2025-04-25 RX ADMIN — SULFASALAZINE 1000 MG: 500 TABLET ORAL at 21:08

## 2025-04-25 RX ADMIN — TAMSULOSIN HYDROCHLORIDE 0.4 MG: 0.4 CAPSULE ORAL at 09:54

## 2025-04-25 RX ADMIN — CYCLOBENZAPRINE 5 MG: 10 TABLET, FILM COATED ORAL at 18:36

## 2025-04-25 RX ADMIN — FUROSEMIDE 40 MG: 40 TABLET ORAL at 09:54

## 2025-04-25 RX ADMIN — AMLODIPINE BESYLATE 10 MG: 10 TABLET ORAL at 09:54

## 2025-04-25 RX ADMIN — NYSTATIN 1 APPLICATION: 100000 POWDER TOPICAL at 09:53

## 2025-04-25 RX ADMIN — SULFASALAZINE 1000 MG: 500 TABLET ORAL at 09:55

## 2025-04-25 RX ADMIN — LEVOTHYROXINE SODIUM 25 MCG: 25 TABLET ORAL at 22:41

## 2025-04-25 RX ADMIN — DOCUSATE SODIUM 100 MG: 100 CAPSULE, LIQUID FILLED ORAL at 21:06

## 2025-04-25 RX ADMIN — LISINOPRIL 40 MG: 40 TABLET ORAL at 09:54

## 2025-04-25 RX ADMIN — DOXAZOSIN 1 MG: 2 TABLET ORAL at 21:10

## 2025-04-25 RX ADMIN — HYDROXYCHLOROQUINE SULFATE 200 MG: 200 TABLET ORAL at 12:40

## 2025-04-25 RX ADMIN — DOXAZOSIN 1 MG: 2 TABLET ORAL at 09:58

## 2025-04-25 RX ADMIN — LIDOCAINE 1 PATCH: 4 PATCH TOPICAL at 09:54

## 2025-04-25 RX ADMIN — CYCLOBENZAPRINE 5 MG: 10 TABLET, FILM COATED ORAL at 06:53

## 2025-04-25 RX ADMIN — ROSUVASTATIN CALCIUM 10 MG: 10 TABLET, FILM COATED ORAL at 09:54

## 2025-04-25 RX ADMIN — PANTOPRAZOLE SODIUM 40 MG: 40 TABLET, DELAYED RELEASE ORAL at 06:52

## 2025-04-25 RX ADMIN — NYSTATIN 1 APPLICATION: 100000 POWDER TOPICAL at 21:08

## 2025-04-25 RX ADMIN — ACETAMINOPHEN 975 MG: 325 TABLET, FILM COATED ORAL at 12:40

## 2025-04-25 RX ADMIN — ACETAMINOPHEN 975 MG: 325 TABLET, FILM COATED ORAL at 23:54

## 2025-04-25 RX ADMIN — POLYETHYLENE GLYCOL 3350 17 G: 17 POWDER, FOR SOLUTION ORAL at 09:54

## 2025-04-25 RX ADMIN — LISINOPRIL 40 MG: 40 TABLET ORAL at 21:07

## 2025-04-25 RX ADMIN — MAGNESIUM HYDROXIDE 10 ML: 2400 SUSPENSION ORAL at 21:06

## 2025-04-25 RX ADMIN — METHYLPREDNISOLONE 8 MG: 4 TABLET ORAL at 12:42

## 2025-04-25 RX ADMIN — HYDROXYZINE HYDROCHLORIDE 10 MG: 10 TABLET ORAL at 09:55

## 2025-04-25 RX ADMIN — APIXABAN 5 MG: 5 TABLET, FILM COATED ORAL at 09:54

## 2025-04-25 RX ADMIN — HYDROXYCHLOROQUINE SULFATE 200 MG: 200 TABLET ORAL at 23:54

## 2025-04-25 RX ADMIN — APIXABAN 5 MG: 5 TABLET, FILM COATED ORAL at 21:06

## 2025-04-25 ASSESSMENT — PAIN SCALES - GENERAL
PAINLEVEL_OUTOF10: 8
PAINLEVEL_OUTOF10: 8

## 2025-04-25 ASSESSMENT — COGNITIVE AND FUNCTIONAL STATUS - GENERAL
TURNING FROM BACK TO SIDE WHILE IN FLAT BAD: A LOT
MOBILITY SCORE: 13
MOVING FROM LYING ON BACK TO SITTING ON SIDE OF FLAT BED WITH BEDRAILS: A LOT
WALKING IN HOSPITAL ROOM: A LITTLE
CLIMB 3 TO 5 STEPS WITH RAILING: TOTAL
MOVING TO AND FROM BED TO CHAIR: A LITTLE
STANDING UP FROM CHAIR USING ARMS: A LOT

## 2025-04-25 ASSESSMENT — PAIN - FUNCTIONAL ASSESSMENT: PAIN_FUNCTIONAL_ASSESSMENT: 0-10

## 2025-04-25 NOTE — CARE PLAN
The patient's goals for the shift include pain relief    The clinical goals for the shift include free from falls

## 2025-04-25 NOTE — PROGRESS NOTES
Physical Therapy    Physical Therapy Treatment    Patient Name: Jing Bae  MRN: [id number]  Department: [facility]  Room: [facility]  Today's Date: 4/25/2025  Time Calculation  Start Time: 0903  Stop Time: 0931  Time Calculation (min): 28 min         Assessment/Plan   PT Assessment  End of Session Communication: Bedside nurse  End of Session Patient Position: Up in chair, Alarm on     PT Plan  Treatment/Interventions: Bed mobility, Transfer training, Gait training, Balance training, Strengthening, Endurance training, Range of motion, Therapeutic exercise, Therapeutic activity, Home exercise program, Positioning  PT Plan: Ongoing PT  PT Frequency: 3 times per week  PT Discharge Recommendations: Moderate intensity level of continued care (collaborated with evaluating PT)  PT - OK to Discharge: Yes      General Visit Information:   PT  Visit  PT Received On: 04/25/25  General  Prior to Session Communication: Bedside nurse, PCT/NA/CTA  Patient Position Received: Bed, 2 rail up, Alarm off, not on at start of session, Alarm off, caregiver present (PCNA at bedside)  General Comment:  (pt pleasant; agreeable to participate in therapy)    Subjective   Precautions:  Precautions  Medical Precautions: Fall precautions  Precautions Comment: OOB with assist        Objective   Pain:  Pain Assessment  Pain Assessment: 0-10  0-10 (Numeric) Pain Score: 8  Pain Type: Acute pain  Pain Location: Back  Pain Orientation: Left, Lower  Pain Interventions:  (cold pack applied end of tx session)    Cognition:  Cognition  Overall Cognitive Status: Within Functional Limits     Treatments:  Bed Mobility  Bed Mobility:  (sup > sit with mod A x 1 via logroll technique; bedrail used to assist.)    Ambulation/Gait Training  Ambulation/Gait Training Performed:  (pt ambulates approx. 12 ft and 20 ft with seated rest break btwn.  FWW and min A x 1.  pt also able to maintain additional ~2 min. static stance at FWW with CGA.)    Transfers  Transfer:   (sit <> stand x2 trials with FWW.  mod A x 2 from bed;  mod A x 1 from BSC.  cuing for safe hand placement/ sequencing.)    Outcome Measures:  [facility] Basic Mobility  Turning from your back to your side while in a flat bed without using bedrails: A lot  Moving from lying on your back to sitting on the side of a flat bed without using bedrails: A lot  Moving to and from bed to chair (including a wheelchair): A little  Standing up from a chair using your arms (e.g. wheelchair or bedside chair): A lot  To walk in hospital room: A little  Climbing 3-5 steps with railing: Total  Basic Mobility - Total Score: 13    Education Documentation  Precautions, taught by Paige Uribe PTA at 4/25/2025 12:32 PM.  Learner: Patient  Readiness: Acceptance  Method: Explanation  Response: Verbalizes Understanding, Needs Reinforcement    Body Mechanics, taught by Paige Uribe PTA at 4/25/2025 12:32 PM.  Learner: Patient  Readiness: Acceptance  Method: Explanation  Response: Verbalizes Understanding, Needs Reinforcement    Mobility Training, taught by Paige Uribe PTA at 4/25/2025 12:32 PM.  Learner: Patient  Readiness: Acceptance  Method: Explanation  Response: Verbalizes Understanding, Needs Reinforcement    Education Comments  No comments found.        EDUCATION:       Encounter Problems       Encounter Problems (Active)       PT Problem       PT Goal 1 STG - Pt will transition supine <> sitting with MOD I  (Progressing)       Start:  04/22/25    Expected End:  05/06/25            PT Goal 2 STG - Pt will transfer STS with MOD I  (Progressing)       Start:  04/22/25    Expected End:  05/06/25            PT Goal 3 STG - Pt will amb 75' using FWW with MOD I  (Progressing)       Start:  04/22/25    Expected End:  05/06/25            PT Goal 4 STG - Pt will perform a B LE ther ex program of 2-3 sets of 10  (Progressing)       Start:  04/22/25    Expected End:  05/06/25

## 2025-04-25 NOTE — PROGRESS NOTES
Internal Medicine Progress Note         Jing Bae is a 84 y.o. female on day 0 of admission presenting with Lumbar strain, initial encounter.    SUBJECTIVE    Overall patient's noted her back pain has much improved.  Previously she was noting her back pain was a 20 out of 10 and now she is noting it is a 6-7 out of 10.  She also noticed that the Flexeril providing her significant relief.  Patient did not she is having urinary retention requiring straight catheterization.  She declined the Perry.      OBJECTIVE    Vitals:    04/24/25 1620 04/24/25 1948 04/25/25 0500 04/25/25 0809   BP: (!) 187/78 160/70 151/67 158/67   BP Location:  Left arm Left arm Left arm   Patient Position:   Lying Lying   Pulse: 66 68 61 68   Resp:  18 19 18   Temp: 36.6 °C (97.9 °F)  36.1 °C (97 °F) 37.1 °C (98.8 °F)   TempSrc:   Temporal Temporal   SpO2: 93%  92% 91%   Weight:       Height:          Results from last 7 days   Lab Units 04/25/25  0545 04/22/25  0553 04/21/25  1217   WBC AUTO x10*3/uL 6.0   < > 5.2   HEMOGLOBIN g/dL 13.6   < > 12.9   HEMATOCRIT % 44.7   < > 42.7   PLATELETS AUTO x10*3/uL 156   < > 113*   NEUTROS PCT AUTO %  --   --  76.8   LYMPHS PCT AUTO %  --   --  13.1   MONOS PCT AUTO %  --   --  8.3   EOS PCT AUTO %  --   --  1.0    < > = values in this interval not displayed.     Results from last 7 days   Lab Units 04/25/25  0544 04/21/25  1357 04/21/25  1217   SODIUM mmol/L 139   < > 136   POTASSIUM mmol/L 3.9   < > 7.3*   CHLORIDE mmol/L 102   < > 104   CO2 mmol/L 30   < > 23   BUN mg/dL 27*   < > 33*   CREATININE mg/dL 1.16*   < > 1.16*   CALCIUM mg/dL 9.3   < > 9.2   PROTEIN TOTAL g/dL  --   --  7.8   BILIRUBIN TOTAL mg/dL  --   --  0.7   ALK PHOS U/L  --   --  65   ALT U/L  --   --  15   AST U/L  --   --  57*   GLUCOSE mg/dL 95   < > 117*    < > = values in this interval not displayed.       Scheduled Medications  Scheduled Medications[1]        Physical Exam  Physical Exam:  General:  Pleasant and cooperative.  Obese female.  Agitated however answering questions appropriately  HEENT:  Normocephalic, atraumatic, mucus membranes moist.   Neck:  Trachea midline.  No JVD.    Chest:  Clear to auscultation bilaterally. No wheezes, rales, or rhonchi.  CV:  Regular rate and rhythm.  Positive S1/S2.   Abdomen: Bowel sounds present in all four quadrants, abdomen is soft, non-tender, non-distended.  Extremities:  No lower extremity edema or cyanosis.   Neurological:  AAOx3. No focal deficits.  Skin:  Warm and dry.                 ASSESSMENT & PLAN    Lumbosacral strain  Paraspinal muscle tenderness  Morbid obesity  Ambulatory dysfunction secondary to above  History of rheumatoid arthritis  History of chronic degenerative joint disease    PLAN:  -Continue scheduled Tylenol, Flexeril 3 times daily as needed, lidocaine patch  - Patient did not want to take gabapentin and therefore was discontinued.  - Patient takes oxycodone 5 mg as needed for severe pain  - Emphasized the importance of trying to ambulate to the best of her ability.  Discussed with nursing staff to try to get patient to walk today in the hospital halls.  - Continue with Medrol Dosepak  - Continue patient's home medications for rheumatoid arthritis  - MRI showed degenerative changes in lower back.  Moderate spinal stenosis, narrowing of the subarticular recess and moderate bilateral neural chaney stenosis  - Referral to neurosurgery in the outpatient setting      CKD stage III  Acute urinary retention     PLAN:  - Monitor patient's creatinine.  Currently at baseline.  - Toradol and NSAIDs as needed while patient's creatinine is at baseline.  -Patient continues to have some residual volume after she urinates.  We did offer her a Perry catheter with urology follow-up in the outpatient for voiding trials however patient declined wanting to have a Perry in place at this time.  - Will refer patient to  urology for concern for overflow incontinence.  - Patient will be discharged to rehab facility with intermittent straight catheterization as needed for urinary retention.  - Continue Flomax       CAD  A-fib on Eliquis  Hypertension  Hyperlipidemia  PLAN:  - Continue anticoagulation  - Continue amlodipine and lisinopril  - Continue diuretic  - Continue statin     Chronic Conditions  Hypothyroidism-continue Synthroid  MARTA-continue CPAP        DVT ppx: Eliquis  GI ppx: Protonix  IVF: None  Diet: Regular diet  Consults: PT/OT  CODE STATUS: Full code  Dispo: Awaiting acceptance from a rehab facility prior to discharge.    Blake Allen,    Please SecureChat for any further questions  This is a preliminary note, please await attending attestation for final A/P          [1] acetaminophen, 975 mg, oral, BID  amLODIPine, 10 mg, oral, q AM  apixaban, 5 mg, oral, BID  doxazosin, 1 mg, oral, BID  furosemide, 40 mg, oral, Daily  hydroxychloroquine, 200 mg, oral, q12h  levothyroxine, 25 mcg, oral, Nightly  lidocaine, 1 patch, transdermal, Daily  lisinopril, 40 mg, oral, BID  methylPREDNISolone, 8 mg, oral, Once   Followed by  [START ON 4/26/2025] methylPREDNISolone, 4 mg, oral, Once  nystatin, 1 Application, Topical, BID  pantoprazole, 40 mg, oral, Daily before breakfast  polyethylene glycol, 17 g, oral, Daily  rosuvastatin, 10 mg, oral, Daily  sulfaSALAzine, 1,000 mg, oral, BID  tamsulosin, 0.4 mg, oral, Daily

## 2025-04-25 NOTE — CARE PLAN
The patient's goals for the shift include pain relief    The clinical goals for the shift include pt to remain free from falls until end of shift

## 2025-04-26 LAB
ALBUMIN SERPL BCP-MCNC: 3.9 G/DL (ref 3.4–5)
ANION GAP SERPL CALC-SCNC: 11 MMOL/L (ref 10–20)
BUN SERPL-MCNC: 32 MG/DL (ref 6–23)
CALCIUM SERPL-MCNC: 8.9 MG/DL (ref 8.6–10.3)
CHLORIDE SERPL-SCNC: 100 MMOL/L (ref 98–107)
CO2 SERPL-SCNC: 33 MMOL/L (ref 21–32)
CREAT SERPL-MCNC: 1.22 MG/DL (ref 0.5–1.05)
EGFRCR SERPLBLD CKD-EPI 2021: 44 ML/MIN/1.73M*2
ERYTHROCYTE [DISTWIDTH] IN BLOOD BY AUTOMATED COUNT: 14.8 % (ref 11.5–14.5)
GLUCOSE SERPL-MCNC: 94 MG/DL (ref 74–99)
HCT VFR BLD AUTO: 40.4 % (ref 36–46)
HGB BLD-MCNC: 12.7 G/DL (ref 12–16)
MAGNESIUM SERPL-MCNC: 2.41 MG/DL (ref 1.6–2.4)
MCH RBC QN AUTO: 31.4 PG (ref 26–34)
MCHC RBC AUTO-ENTMCNC: 31.4 G/DL (ref 32–36)
MCV RBC AUTO: 100 FL (ref 80–100)
NRBC BLD-RTO: 0 /100 WBCS (ref 0–0)
PHOSPHATE SERPL-MCNC: 4.1 MG/DL (ref 2.5–4.9)
PLATELET # BLD AUTO: 146 X10*3/UL (ref 150–450)
POTASSIUM SERPL-SCNC: 4.2 MMOL/L (ref 3.5–5.3)
RBC # BLD AUTO: 4.04 X10*6/UL (ref 4–5.2)
SODIUM SERPL-SCNC: 140 MMOL/L (ref 136–145)
WBC # BLD AUTO: 5.3 X10*3/UL (ref 4.4–11.3)

## 2025-04-26 PROCEDURE — 2500000004 HC RX 250 GENERAL PHARMACY W/ HCPCS (ALT 636 FOR OP/ED)

## 2025-04-26 PROCEDURE — 2500000001 HC RX 250 WO HCPCS SELF ADMINISTERED DRUGS (ALT 637 FOR MEDICARE OP): Performed by: INTERNAL MEDICINE

## 2025-04-26 PROCEDURE — 2500000001 HC RX 250 WO HCPCS SELF ADMINISTERED DRUGS (ALT 637 FOR MEDICARE OP)

## 2025-04-26 PROCEDURE — 2500000002 HC RX 250 W HCPCS SELF ADMINISTERED DRUGS (ALT 637 FOR MEDICARE OP, ALT 636 FOR OP/ED): Performed by: INTERNAL MEDICINE

## 2025-04-26 PROCEDURE — 80069 RENAL FUNCTION PANEL: CPT

## 2025-04-26 PROCEDURE — G0378 HOSPITAL OBSERVATION PER HR: HCPCS

## 2025-04-26 PROCEDURE — 85027 COMPLETE CBC AUTOMATED: CPT

## 2025-04-26 PROCEDURE — 2500000002 HC RX 250 W HCPCS SELF ADMINISTERED DRUGS (ALT 637 FOR MEDICARE OP, ALT 636 FOR OP/ED)

## 2025-04-26 PROCEDURE — 51701 INSERT BLADDER CATHETER: CPT

## 2025-04-26 PROCEDURE — 83735 ASSAY OF MAGNESIUM: CPT

## 2025-04-26 PROCEDURE — 2500000005 HC RX 250 GENERAL PHARMACY W/O HCPCS: Performed by: EMERGENCY MEDICINE

## 2025-04-26 PROCEDURE — 36415 COLL VENOUS BLD VENIPUNCTURE: CPT

## 2025-04-26 PROCEDURE — 99232 SBSQ HOSP IP/OBS MODERATE 35: CPT | Performed by: INTERNAL MEDICINE

## 2025-04-26 RX ORDER — BISACODYL 10 MG/1
10 SUPPOSITORY RECTAL DAILY
Status: DISCONTINUED | OUTPATIENT
Start: 2025-04-26 | End: 2025-04-26

## 2025-04-26 RX ORDER — BISACODYL 10 MG/1
10 SUPPOSITORY RECTAL DAILY PRN
Status: DISCONTINUED | OUTPATIENT
Start: 2025-04-26 | End: 2025-05-01 | Stop reason: HOSPADM

## 2025-04-26 RX ADMIN — LEVOTHYROXINE SODIUM 25 MCG: 25 TABLET ORAL at 22:24

## 2025-04-26 RX ADMIN — CYCLOBENZAPRINE 5 MG: 10 TABLET, FILM COATED ORAL at 20:46

## 2025-04-26 RX ADMIN — POLYETHYLENE GLYCOL 3350 17 G: 17 POWDER, FOR SOLUTION ORAL at 09:44

## 2025-04-26 RX ADMIN — LIDOCAINE 1 PATCH: 4 PATCH TOPICAL at 09:43

## 2025-04-26 RX ADMIN — LISINOPRIL 40 MG: 40 TABLET ORAL at 09:45

## 2025-04-26 RX ADMIN — SULFASALAZINE 1000 MG: 500 TABLET ORAL at 20:46

## 2025-04-26 RX ADMIN — PANTOPRAZOLE SODIUM 40 MG: 40 TABLET, DELAYED RELEASE ORAL at 06:23

## 2025-04-26 RX ADMIN — LISINOPRIL 40 MG: 40 TABLET ORAL at 20:46

## 2025-04-26 RX ADMIN — NYSTATIN 1 APPLICATION: 100000 POWDER TOPICAL at 20:48

## 2025-04-26 RX ADMIN — FUROSEMIDE 40 MG: 40 TABLET ORAL at 09:44

## 2025-04-26 RX ADMIN — TAMSULOSIN HYDROCHLORIDE 0.4 MG: 0.4 CAPSULE ORAL at 09:44

## 2025-04-26 RX ADMIN — APIXABAN 5 MG: 5 TABLET, FILM COATED ORAL at 09:44

## 2025-04-26 RX ADMIN — METHYLPREDNISOLONE 4 MG: 4 TABLET ORAL at 12:40

## 2025-04-26 RX ADMIN — HYDROXYCHLOROQUINE SULFATE 200 MG: 200 TABLET ORAL at 23:08

## 2025-04-26 RX ADMIN — HYDROXYCHLOROQUINE SULFATE 200 MG: 200 TABLET ORAL at 10:18

## 2025-04-26 RX ADMIN — CYCLOBENZAPRINE 5 MG: 10 TABLET, FILM COATED ORAL at 06:31

## 2025-04-26 RX ADMIN — AMLODIPINE BESYLATE 10 MG: 10 TABLET ORAL at 09:44

## 2025-04-26 RX ADMIN — BISACODYL 10 MG: 10 SUPPOSITORY RECTAL at 10:18

## 2025-04-26 RX ADMIN — SULFASALAZINE 1000 MG: 500 TABLET ORAL at 09:45

## 2025-04-26 RX ADMIN — ACETAMINOPHEN 975 MG: 325 TABLET, FILM COATED ORAL at 23:08

## 2025-04-26 RX ADMIN — DOXAZOSIN 1 MG: 2 TABLET ORAL at 20:46

## 2025-04-26 RX ADMIN — DOXAZOSIN 1 MG: 2 TABLET ORAL at 09:45

## 2025-04-26 RX ADMIN — ACETAMINOPHEN 975 MG: 325 TABLET, FILM COATED ORAL at 10:18

## 2025-04-26 RX ADMIN — APIXABAN 5 MG: 5 TABLET, FILM COATED ORAL at 20:46

## 2025-04-26 RX ADMIN — NYSTATIN 1 APPLICATION: 100000 POWDER TOPICAL at 09:46

## 2025-04-26 ASSESSMENT — COGNITIVE AND FUNCTIONAL STATUS - GENERAL
DAILY ACTIVITIY SCORE: 23
MOBILITY SCORE: 21
CLIMB 3 TO 5 STEPS WITH RAILING: A LITTLE
MOVING TO AND FROM BED TO CHAIR: A LITTLE
WALKING IN HOSPITAL ROOM: A LITTLE
TOILETING: A LITTLE

## 2025-04-26 ASSESSMENT — PAIN - FUNCTIONAL ASSESSMENT: PAIN_FUNCTIONAL_ASSESSMENT: 0-10

## 2025-04-26 ASSESSMENT — PAIN SCALES - GENERAL
PAINLEVEL_OUTOF10: 5 - MODERATE PAIN
PAINLEVEL_OUTOF10: 0 - NO PAIN

## 2025-04-26 NOTE — PROGRESS NOTES
04/26/25 0822   Discharge Planning   Home or Post Acute Services Post acute facilities (Rehab/SNF/etc)   Type of Post Acute Facility Services Skilled nursing   Expected Discharge Disposition SNF   Does the patient need discharge transport arranged? Yes   RoundTrip coordination needed? Yes   Has discharge transport been arranged? No     [facility] reached out to the direct precert team to check on status of auth, still pending at this time.  Care transitions to follow.

## 2025-04-26 NOTE — NURSING NOTE
Pt voided 400, post void residual >820 per bladder scan. Notified Dr. Costello at 0530 for corona order. Pt did not wish at that time to have corona or any additional straight cath. Pt was told by doctors yesterday, and Dr. Costello stated again that pt would continue to have issues with retention until she was up and moving. Pt fearful of corona d/t risk of infection and inhibiting ambulation. Educated pt regarding infection prevention and ability to ambulate with corona in place, as well as risks of continued bladder distention. Pt would like to reevaluate need once she has been up to walk

## 2025-04-26 NOTE — CARE PLAN
The patient's goals for the shift include pain relief    The clinical goals for the shift include pt will be safe and comftorable      Problem: Pain - Adult  Goal: Verbalizes/displays adequate comfort level or baseline comfort level  Outcome: Progressing     Problem: Safety - Adult  Goal: Free from fall injury  Outcome: Progressing     Problem: Discharge Planning  Goal: Discharge to home or other facility with appropriate resources  Outcome: Progressing     Problem: Chronic Conditions and Co-morbidities  Goal: Patient's chronic conditions and co-morbidity symptoms are monitored and maintained or improved  Outcome: Progressing     Problem: Nutrition  Goal: Nutrient intake appropriate for maintaining nutritional needs  Outcome: Progressing     Problem: Pain  Goal: Takes deep breaths with improved pain control throughout the shift  Outcome: Progressing  Goal: Turns in bed with improved pain control throughout the shift  Outcome: Progressing  Goal: Walks with improved pain control throughout the shift  Outcome: Progressing  Goal: Performs ADL's with improved pain control throughout shift  Outcome: Progressing  Goal: Participates in PT with improved pain control throughout the shift  Outcome: Progressing  Goal: Free from opioid side effects throughout the shift  Outcome: Progressing  Goal: Free from acute confusion related to pain meds throughout the shift  Outcome: Progressing     Problem: Skin  Goal: Decreased wound size/increased tissue granulation at next dressing change  Outcome: Progressing  Flowsheets (Taken 4/26/2025 1122)  Decreased wound size/increased tissue granulation at next dressing change: Promote sleep for wound healing  Goal: Participates in plan/prevention/treatment measures  Outcome: Progressing  Flowsheets (Taken 4/26/2025 1122)  Participates in plan/prevention/treatment measures: Increase activity/out of bed for meals  Goal: Prevent/manage excess moisture  Outcome: Progressing  Flowsheets (Taken  4/26/2025 1122)  Prevent/manage excess moisture: Moisturize dry skin  Goal: Prevent/minimize sheer/friction injuries  Outcome: Progressing  Flowsheets (Taken 4/26/2025 1122)  Prevent/minimize sheer/friction injuries: Turn/reposition every 2 hours/use positioning/transfer devices  Goal: Promote/optimize nutrition  Outcome: Progressing  Flowsheets (Taken 4/26/2025 1122)  Promote/optimize nutrition: Offer water/supplements/favorite foods  Goal: Promote skin healing  Outcome: Progressing  Flowsheets (Taken 4/26/2025 1122)  Promote skin healing: Turn/reposition every 2 hours/use positioning/transfer devices

## 2025-04-26 NOTE — PROGRESS NOTES
Internal Medicine Progress Note         Jing Bae is a 84 y.o. female on day 0 of admission presenting with Lumbar strain, initial encounter.    SUBJECTIVE    Overall patient is doing well.  Awaiting pre-CERT      OBJECTIVE    Vitals:    04/25/25 0809 04/25/25 1540 04/25/25 1927 04/26/25 0509   BP: 158/67 126/59 153/70 165/88   BP Location: Left arm Left arm Left arm Left arm   Patient Position: Lying Lying Lying Lying   Pulse: 68 56 72 53   Resp: 18 18 20 20   Temp: 37.1 °C (98.8 °F) 36.8 °C (98.2 °F) 36.8 °C (98.2 °F) 36.5 °C (97.7 °F)   TempSrc: Temporal Temporal Temporal Temporal   SpO2: 91% 90% 92% 94%   Weight:       Height:          Results from last 7 days   Lab Units 04/26/25  0608 04/22/25  0553 04/21/25  1217   WBC AUTO x10*3/uL 5.3   < > 5.2   HEMOGLOBIN g/dL 12.7   < > 12.9   HEMATOCRIT % 40.4   < > 42.7   PLATELETS AUTO x10*3/uL 146*   < > 113*   NEUTROS PCT AUTO %  --   --  76.8   LYMPHS PCT AUTO %  --   --  13.1   MONOS PCT AUTO %  --   --  8.3   EOS PCT AUTO %  --   --  1.0    < > = values in this interval not displayed.     Results from last 7 days   Lab Units 04/25/25  0544 04/21/25  1357 04/21/25  1217   SODIUM mmol/L 139   < > 136   POTASSIUM mmol/L 3.9   < > 7.3*   CHLORIDE mmol/L 102   < > 104   CO2 mmol/L 30   < > 23   BUN mg/dL 27*   < > 33*   CREATININE mg/dL 1.16*   < > 1.16*   CALCIUM mg/dL 9.3   < > 9.2   PROTEIN TOTAL g/dL  --   --  7.8   BILIRUBIN TOTAL mg/dL  --   --  0.7   ALK PHOS U/L  --   --  65   ALT U/L  --   --  15   AST U/L  --   --  57*   GLUCOSE mg/dL 95   < > 117*    < > = values in this interval not displayed.       Scheduled Medications  Scheduled Medications[1]       Physical Exam  Physical Exam:  General:  Pleasant and cooperative.  Obese female.  Agitated however answering questions appropriately  HEENT:  Normocephalic, atraumatic, mucus membranes moist.   Neck:  Trachea midline.  No JVD.     Chest:  Clear to auscultation bilaterally. No wheezes, rales, or rhonchi.  CV:  Regular rate and rhythm.  Positive S1/S2.   Abdomen: Bowel sounds present in all four quadrants, abdomen is soft, non-tender, non-distended.  Extremities:  No lower extremity edema or cyanosis.   Neurological:  AAOx3. No focal deficits.  Skin:  Warm and dry.                 ASSESSMENT & PLAN    Lumbosacral strain  Paraspinal muscle tenderness  Morbid obesity  Ambulatory dysfunction secondary to above  History of rheumatoid arthritis  History of chronic degenerative joint disease    PLAN:  -Continue scheduled Tylenol, Flexeril 3 times daily as needed, lidocaine patch  - Patient did not want to take gabapentin and therefore was discontinued.  - Patient takes oxycodone 5 mg as needed for severe pain  - Emphasized the importance of trying to ambulate to the best of her ability.  Discussed with nursing staff to try to get patient to walk today in the hospital halls.  - Continue with Medrol Dosepak.  Last day 4/26  - Continue patient's home medications for rheumatoid arthritis  - MRI showed degenerative changes in lower back.  Moderate spinal stenosis, narrowing of the subarticular recess and moderate bilateral neural chaney stenosis  - Referral to neurosurgery in the outpatient setting      CKD stage III  Acute urinary retention     PLAN:  - Monitor patient's creatinine.  Currently at baseline.  - Toradol and NSAIDs as needed while patient's creatinine is at baseline.  -Patient continues to have some residual volume after she urinates.  We did offer her a Perry catheter with urology follow-up in the outpatient for voiding trials however patient declined wanting to have a Perry in place at this time.  - Will refer patient to urology for concern for overflow incontinence.  - Patient will be discharged to rehab facility with intermittent straight catheterization as needed for urinary retention.  - Continue Flomax       CAD  A-fib on  Eliquis  Hypertension  Hyperlipidemia  PLAN:  - Continue anticoagulation  - Continue amlodipine and lisinopril  - Continue diuretic  - Continue statin     Chronic Conditions  Hypothyroidism-continue Synthroid  MARTA-continue CPAP        DVT ppx: Eliquis  GI ppx: Protonix  IVF: None  Diet: Regular diet  Consults: PT/OT  CODE STATUS: Full code  Dispo: Awaiting acceptance from a rehab facility prior to discharge.    Blake Allen, DO   Please SecureChat for any further questions  This is a preliminary note, please await attending attestation for final A/P          [1] acetaminophen, 975 mg, oral, BID  amLODIPine, 10 mg, oral, q AM  apixaban, 5 mg, oral, BID  doxazosin, 1 mg, oral, BID  furosemide, 40 mg, oral, Daily  hydroxychloroquine, 200 mg, oral, q12h  levothyroxine, 25 mcg, oral, Nightly  lidocaine, 1 patch, transdermal, Daily  lisinopril, 40 mg, oral, BID  methylPREDNISolone, 4 mg, oral, Once  nystatin, 1 Application, Topical, BID  pantoprazole, 40 mg, oral, Daily before breakfast  polyethylene glycol, 17 g, oral, Daily  rosuvastatin, 10 mg, oral, Daily  sulfaSALAzine, 1,000 mg, oral, BID  tamsulosin, 0.4 mg, oral, Daily

## 2025-04-26 NOTE — CARE PLAN
The patient's goals for the shift include pain relief    The clinical goals for the shift include Maintain pain to tolerable level      Problem: Pain - Adult  Goal: Verbalizes/displays adequate comfort level or baseline comfort level  4/26/2025 0329 by Patricia Skinner RN  Outcome: Progressing  4/26/2025 0328 by Patricia Skinner RN  Outcome: Progressing  4/26/2025 0328 by Patricia Skinner RN  Outcome: Progressing     Problem: Safety - Adult  Goal: Free from fall injury  4/26/2025 0329 by Patricia Skinner RN  Outcome: Progressing  4/26/2025 0328 by Patricia Skinner RN  Outcome: Progressing  4/26/2025 0328 by Patricia Skinner RN  Outcome: Progressing     Problem: Pain  Goal: Takes deep breaths with improved pain control throughout the shift  4/26/2025 0329 by Patricia Skinner RN  Outcome: Progressing  4/26/2025 0328 by Patricia Skinner RN  Outcome: Progressing  4/26/2025 0328 by Patricia Skinner RN  Outcome: Progressing  Goal: Turns in bed with improved pain control throughout the shift  4/26/2025 0329 by Patricia Skinner RN  Outcome: Progressing  4/26/2025 0328 by Patricia Skinner RN  Outcome: Progressing  4/26/2025 0328 by Patricia Skinner RN  Outcome: Progressing  Goal: Walks with improved pain control throughout the shift  4/26/2025 0329 by Patricia Skinner RN  Outcome: Progressing  4/26/2025 0328 by Patricia Skinner RN  Outcome: Progressing  4/26/2025 0328 by Patricia Skinner RN  Outcome: Progressing  Goal: Performs ADL's with improved pain control throughout shift  4/26/2025 0329 by Patricia Skinner RN  Outcome: Progressing  4/26/2025 0328 by Patricia Skinner RN  Outcome: Progressing  4/26/2025 0328 by Patricia Skinner RN  Outcome: Progressing  Goal: Participates in PT with improved pain control throughout the shift  4/26/2025 0329 by Patricia Skinner RN  Outcome: Progressing  4/26/2025 0328 by Patricia Skinner RN  Outcome: Progressing  4/26/2025 0328 by Patricia Skinner, RN  Outcome: Progressing  Goal: Free from opioid side effects throughout the  shift  4/26/2025 0329 by Patricia Skinner RN  Outcome: Progressing  4/26/2025 0328 by Patricia Skinner RN  Outcome: Progressing  4/26/2025 0328 by Patricia Skinner RN  Outcome: Progressing  Goal: Free from acute confusion related to pain meds throughout the shift  4/26/2025 0329 by Patricia Skinner RN  Outcome: Progressing  4/26/2025 0328 by Patricia Skinner RN  Outcome: Progressing  4/26/2025 0328 by Patricia Skinner RN  Outcome: Progressing

## 2025-04-26 NOTE — PROGRESS NOTES
Internal Medicine Progress Note         Jing Bae is a 84 y.o. female on day 0 of admission presenting with Lumbar strain, initial encounter.    SUBJECTIVE    Overall patient is doing well.  Awaiting pre-CERT.  No acute events overnight.  Patient notes that her pain is doing much better compared to the days prior.  Is eager to get up and walk around the hospital floor      OBJECTIVE    Vitals:    04/25/25 1540 04/25/25 1927 04/26/25 0509 04/26/25 0808   BP: 126/59 153/70 165/88 (!) 186/83   BP Location: Left arm Left arm Left arm    Patient Position: Lying Lying Lying    Pulse: 56 72 53 54   Resp: 18 20 20    Temp: 36.8 °C (98.2 °F) 36.8 °C (98.2 °F) 36.5 °C (97.7 °F) 35.7 °C (96.3 °F)   TempSrc: Temporal Temporal Temporal    SpO2: 90% 92% 94% 93%   Weight:       Height:          Results from last 7 days   Lab Units 04/26/25  0608 04/22/25  0553 04/21/25  1217   WBC AUTO x10*3/uL 5.3   < > 5.2   HEMOGLOBIN g/dL 12.7   < > 12.9   HEMATOCRIT % 40.4   < > 42.7   PLATELETS AUTO x10*3/uL 146*   < > 113*   NEUTROS PCT AUTO %  --   --  76.8   LYMPHS PCT AUTO %  --   --  13.1   MONOS PCT AUTO %  --   --  8.3   EOS PCT AUTO %  --   --  1.0    < > = values in this interval not displayed.     Results from last 7 days   Lab Units 04/26/25  0608 04/21/25  1357 04/21/25  1217   SODIUM mmol/L 140   < > 136   POTASSIUM mmol/L 4.2   < > 7.3*   CHLORIDE mmol/L 100   < > 104   CO2 mmol/L 33*   < > 23   BUN mg/dL 32*   < > 33*   CREATININE mg/dL 1.22*   < > 1.16*   CALCIUM mg/dL 8.9   < > 9.2   PROTEIN TOTAL g/dL  --   --  7.8   BILIRUBIN TOTAL mg/dL  --   --  0.7   ALK PHOS U/L  --   --  65   ALT U/L  --   --  15   AST U/L  --   --  57*   GLUCOSE mg/dL 94   < > 117*    < > = values in this interval not displayed.       Scheduled Medications  Scheduled Medications[1]       Physical Exam  Physical Exam:  General:  Pleasant and cooperative.  Obese female.   Agitated however answering questions appropriately  HEENT:  Normocephalic, atraumatic, mucus membranes moist.   Neck:  Trachea midline.  No JVD.    Chest:  Clear to auscultation bilaterally. No wheezes, rales, or rhonchi.  CV:  Regular rate and rhythm.  Positive S1/S2.   Abdomen: Bowel sounds present in all four quadrants, abdomen is soft, non-tender, non-distended.  Extremities:  No lower extremity edema or cyanosis.   Neurological:  AAOx3. No focal deficits.  Skin:  Warm and dry.                 ASSESSMENT & PLAN    Lumbosacral strain  Paraspinal muscle tenderness  Morbid obesity  Ambulatory dysfunction secondary to above  History of rheumatoid arthritis  History of chronic degenerative joint disease    PLAN:  -Continue scheduled Tylenol, Flexeril 3 times daily as needed, lidocaine patch  - Patient did not want to take gabapentin and therefore was discontinued.  - Patient takes oxycodone 5 mg as needed for severe pain  - Emphasized the importance of trying to ambulate to the best of her ability.  Discussed with nursing staff to try to get patient to walk today in the hospital halls.  - Continue with Medrol Dosepak.  Last day 4/26  - Continue patient's home medications for rheumatoid arthritis  - MRI showed degenerative changes in lower back.  Moderate spinal stenosis, narrowing of the subarticular recess and moderate bilateral neural chaney stenosis  - Referral to neurosurgery in the outpatient setting      CKD stage III  Acute urinary retention     PLAN:  - Monitor patient's creatinine.  Currently at baseline.  - Toradol and NSAIDs as needed while patient's creatinine is at baseline.  -Patient continues to have some residual volume after she urinates.  We did offer her a Perry catheter with urology follow-up in the outpatient for voiding trials however patient declined wanting to have a Perry in place at this time.  - Will refer patient to urology for concern for overflow incontinence.  - Patient will be  discharged to rehab facility with intermittent straight catheterization as needed for urinary retention.  - Continue Flomax       CAD  A-fib on Eliquis  Hypertension  Hyperlipidemia  PLAN:  - Continue anticoagulation  - Continue amlodipine and lisinopril  - Continue diuretic  - Continue statin    Constipation    PLAN  -Continue with MiraLAX, Colace, suppositories.  - Encourage ambulation as this can help facilitate having a bowel movement  - If still no bowel movement can consider an enema     Chronic Conditions  Hypothyroidism-continue Synthroid  MARTA-continue CPAP        DVT ppx: Eliquis  GI ppx: Protonix  IVF: None  Diet: Regular diet  Consults: PT/OT  CODE STATUS: Full code  Dispo: Awaiting acceptance from a rehab facility prior to discharge.    Blake Allen, DO   Please SecureChat for any further questions  This is a preliminary note, please await attending attestation for final A/P          [1] acetaminophen, 975 mg, oral, BID  amLODIPine, 10 mg, oral, q AM  apixaban, 5 mg, oral, BID  doxazosin, 1 mg, oral, BID  furosemide, 40 mg, oral, Daily  hydroxychloroquine, 200 mg, oral, q12h  levothyroxine, 25 mcg, oral, Nightly  lidocaine, 1 patch, transdermal, Daily  lisinopril, 40 mg, oral, BID  methylPREDNISolone, 4 mg, oral, Once  nystatin, 1 Application, Topical, BID  pantoprazole, 40 mg, oral, Daily before breakfast  polyethylene glycol, 17 g, oral, Daily  rosuvastatin, 10 mg, oral, Daily  sulfaSALAzine, 1,000 mg, oral, BID  tamsulosin, 0.4 mg, oral, Daily

## 2025-04-27 VITALS
OXYGEN SATURATION: 93 % | HEIGHT: 65 IN | HEART RATE: 54 BPM | TEMPERATURE: 96.4 F | SYSTOLIC BLOOD PRESSURE: 146 MMHG | WEIGHT: 240.3 LBS | DIASTOLIC BLOOD PRESSURE: 58 MMHG | BODY MASS INDEX: 40.04 KG/M2 | RESPIRATION RATE: 22 BRPM

## 2025-04-27 PROCEDURE — G0378 HOSPITAL OBSERVATION PER HR: HCPCS

## 2025-04-27 PROCEDURE — 2500000005 HC RX 250 GENERAL PHARMACY W/O HCPCS: Performed by: EMERGENCY MEDICINE

## 2025-04-27 PROCEDURE — 99232 SBSQ HOSP IP/OBS MODERATE 35: CPT | Performed by: INTERNAL MEDICINE

## 2025-04-27 PROCEDURE — 51701 INSERT BLADDER CATHETER: CPT

## 2025-04-27 PROCEDURE — 2500000004 HC RX 250 GENERAL PHARMACY W/ HCPCS (ALT 636 FOR OP/ED)

## 2025-04-27 PROCEDURE — 2500000002 HC RX 250 W HCPCS SELF ADMINISTERED DRUGS (ALT 637 FOR MEDICARE OP, ALT 636 FOR OP/ED)

## 2025-04-27 PROCEDURE — 2500000002 HC RX 250 W HCPCS SELF ADMINISTERED DRUGS (ALT 637 FOR MEDICARE OP, ALT 636 FOR OP/ED): Performed by: INTERNAL MEDICINE

## 2025-04-27 PROCEDURE — 2500000001 HC RX 250 WO HCPCS SELF ADMINISTERED DRUGS (ALT 637 FOR MEDICARE OP)

## 2025-04-27 PROCEDURE — 2500000001 HC RX 250 WO HCPCS SELF ADMINISTERED DRUGS (ALT 637 FOR MEDICARE OP): Performed by: STUDENT IN AN ORGANIZED HEALTH CARE EDUCATION/TRAINING PROGRAM

## 2025-04-27 RX ADMIN — TAMSULOSIN HYDROCHLORIDE 0.4 MG: 0.4 CAPSULE ORAL at 09:22

## 2025-04-27 RX ADMIN — ROSUVASTATIN CALCIUM 10 MG: 10 TABLET, FILM COATED ORAL at 09:22

## 2025-04-27 RX ADMIN — HYDROXYZINE HYDROCHLORIDE 10 MG: 10 TABLET ORAL at 09:23

## 2025-04-27 RX ADMIN — APIXABAN 5 MG: 5 TABLET, FILM COATED ORAL at 20:30

## 2025-04-27 RX ADMIN — FUROSEMIDE 40 MG: 40 TABLET ORAL at 09:23

## 2025-04-27 RX ADMIN — SULFASALAZINE 1000 MG: 500 TABLET ORAL at 09:23

## 2025-04-27 RX ADMIN — HYDROXYCHLOROQUINE SULFATE 200 MG: 200 TABLET ORAL at 22:58

## 2025-04-27 RX ADMIN — LIDOCAINE 1 PATCH: 4 PATCH TOPICAL at 09:23

## 2025-04-27 RX ADMIN — ACETAMINOPHEN 975 MG: 325 TABLET, FILM COATED ORAL at 22:58

## 2025-04-27 RX ADMIN — HYDROXYCHLOROQUINE SULFATE 200 MG: 200 TABLET ORAL at 12:22

## 2025-04-27 RX ADMIN — PANTOPRAZOLE SODIUM 40 MG: 40 TABLET, DELAYED RELEASE ORAL at 06:27

## 2025-04-27 RX ADMIN — CYCLOBENZAPRINE 5 MG: 10 TABLET, FILM COATED ORAL at 23:00

## 2025-04-27 RX ADMIN — NYSTATIN 1 APPLICATION: 100000 POWDER TOPICAL at 09:24

## 2025-04-27 RX ADMIN — LEVOTHYROXINE SODIUM 25 MCG: 25 TABLET ORAL at 22:58

## 2025-04-27 RX ADMIN — DOXAZOSIN 1 MG: 2 TABLET ORAL at 09:22

## 2025-04-27 RX ADMIN — SULFASALAZINE 1000 MG: 500 TABLET ORAL at 20:30

## 2025-04-27 RX ADMIN — APIXABAN 5 MG: 5 TABLET, FILM COATED ORAL at 09:23

## 2025-04-27 RX ADMIN — LISINOPRIL 40 MG: 40 TABLET ORAL at 09:23

## 2025-04-27 RX ADMIN — LISINOPRIL 40 MG: 40 TABLET ORAL at 20:30

## 2025-04-27 RX ADMIN — ACETAMINOPHEN 975 MG: 325 TABLET, FILM COATED ORAL at 12:22

## 2025-04-27 RX ADMIN — POLYETHYLENE GLYCOL 3350 17 G: 17 POWDER, FOR SOLUTION ORAL at 09:23

## 2025-04-27 RX ADMIN — AMLODIPINE BESYLATE 10 MG: 10 TABLET ORAL at 09:23

## 2025-04-27 RX ADMIN — CYCLOBENZAPRINE 5 MG: 10 TABLET, FILM COATED ORAL at 16:55

## 2025-04-27 RX ADMIN — HYDROXYZINE HYDROCHLORIDE 10 MG: 10 TABLET ORAL at 04:31

## 2025-04-27 RX ADMIN — DOXAZOSIN 1 MG: 2 TABLET ORAL at 20:30

## 2025-04-27 ASSESSMENT — PAIN SCALES - GENERAL: PAINLEVEL_OUTOF10: 0 - NO PAIN

## 2025-04-27 NOTE — PROGRESS NOTES
Internal Medicine Progress Note         Jing Bae is a 84 y.o. female on day 0 of admission presenting with Lumbar strain, initial encounter.    SUBJECTIVE    Patient continues to have urinary retention.  PVR after urinating still greater than 500.  Consulted urology.  Patient notes her back pain is much improved.      OBJECTIVE    Vitals:    04/27/25 0405 04/27/25 0413 04/27/25 0539 04/27/25 0752   BP: (!) 191/85 176/80 166/76 (!) 191/82   BP Location: Left arm Left arm Right arm Right arm   Patient Position: Lying   Lying   Pulse: 57  60 63   Resp: 22   20   Temp: 36.1 °C (97 °F)   35.7 °C (96.3 °F)   TempSrc: Temporal   Temporal   SpO2: 92%   93%   Weight:       Height:          Results from last 7 days   Lab Units 04/26/25  0608 04/22/25  0553 04/21/25  1217   WBC AUTO x10*3/uL 5.3   < > 5.2   HEMOGLOBIN g/dL 12.7   < > 12.9   HEMATOCRIT % 40.4   < > 42.7   PLATELETS AUTO x10*3/uL 146*   < > 113*   NEUTROS PCT AUTO %  --   --  76.8   LYMPHS PCT AUTO %  --   --  13.1   MONOS PCT AUTO %  --   --  8.3   EOS PCT AUTO %  --   --  1.0    < > = values in this interval not displayed.     Results from last 7 days   Lab Units 04/26/25  0608 04/21/25  1357 04/21/25  1217   SODIUM mmol/L 140   < > 136   POTASSIUM mmol/L 4.2   < > 7.3*   CHLORIDE mmol/L 100   < > 104   CO2 mmol/L 33*   < > 23   BUN mg/dL 32*   < > 33*   CREATININE mg/dL 1.22*   < > 1.16*   CALCIUM mg/dL 8.9   < > 9.2   PROTEIN TOTAL g/dL  --   --  7.8   BILIRUBIN TOTAL mg/dL  --   --  0.7   ALK PHOS U/L  --   --  65   ALT U/L  --   --  15   AST U/L  --   --  57*   GLUCOSE mg/dL 94   < > 117*    < > = values in this interval not displayed.       Scheduled Medications  Scheduled Medications[1]       Physical Exam  Physical Exam:  General:  Pleasant and cooperative.  Obese female.  Agitated however answering questions appropriately  HEENT:  Normocephalic, atraumatic, mucus membranes  moist.   Neck:  Trachea midline.  No JVD.    Chest:  Clear to auscultation bilaterally. No wheezes, rales, or rhonchi.  CV:  Regular rate and rhythm.  Positive S1/S2.   Abdomen: Bowel sounds present in all four quadrants, abdomen is soft, non-tender, non-distended.  Extremities:  No lower extremity edema or cyanosis.   Neurological:  AAOx3. No focal deficits.  Skin:  Warm and dry.                 ASSESSMENT & PLAN    Lumbosacral strain  Paraspinal muscle tenderness  Morbid obesity  Ambulatory dysfunction secondary to above  History of rheumatoid arthritis  History of chronic degenerative joint disease    PLAN:  -Continue scheduled Tylenol, Flexeril 3 times daily as needed, lidocaine patch  - Patient did not want to take gabapentin and therefore was discontinued.  - Patient takes oxycodone 5 mg as needed for severe pain  - Emphasized the importance of trying to ambulate to the best of her ability.  Discussed with nursing staff to try to get patient to walk today in the hospital halls.  - Continue with Medrol Dosepak.  Last day 4/26  - Continue patient's home medications for rheumatoid arthritis  - MRI showed degenerative changes in lower back.  Moderate spinal stenosis, narrowing of the subarticular recess and moderate bilateral neural chaney stenosis  - Referral to neurosurgery in the outpatient setting      CKD stage III  Acute urinary retention     PLAN:  - Monitor patient's creatinine.  Slight increase in patient's creatinine from baseline 1.2 from 1.16.  Will monitor closely.  Do not think she is having any hydronephrosis secondary to urinary retention.  - Toradol and NSAIDs as needed while patient's creatinine is at baseline.  -Patient continues to have some residual volume after she urinates.  We did offer her a Perry catheter with urology follow-up in the outpatient for voiding trials however patient declined wanting to have a Perry in place at this time.  -Given patient continues to have persistent urinary  retention despite try to facilitate bladder emptying with physical activity, Flomax we will consult urology at this time for acute urinary retention.  Perry catheterization was recommended however patient declined wanting to try to improve her urination with physical activity and Flomax.  She is now agreeable to seeing urology and if they recommend a Perry catheter she is agreeable to proceed with that.      CAD  A-fib on Eliquis  Hypertension  Hyperlipidemia  PLAN:  - Continue anticoagulation  - Continue amlodipine and lisinopril  - Continue diuretic  - Continue statin    Constipation    PLAN  -Continue with MiraLAX, Colace, suppositories.  - Encourage ambulation as this can help facilitate having a bowel movement  - If still no bowel movement can consider an enema     Chronic Conditions  Hypothyroidism-continue Synthroid  MARTA-continue CPAP        DVT ppx: Eliquis  GI ppx: Protonix  IVF: None  Diet: Regular diet  Consults: PT/OT  CODE STATUS: Full code  Dispo: Awaiting acceptance from a rehab facility prior to discharge.    Blake Allen, DO   Please SecureChat for any further questions  This is a preliminary note, please await attending attestation for final A/P          [1] acetaminophen, 975 mg, oral, BID  amLODIPine, 10 mg, oral, q AM  apixaban, 5 mg, oral, BID  doxazosin, 1 mg, oral, BID  furosemide, 40 mg, oral, Daily  hydroxychloroquine, 200 mg, oral, q12h  levothyroxine, 25 mcg, oral, Nightly  lidocaine, 1 patch, transdermal, Daily  lisinopril, 40 mg, oral, BID  nystatin, 1 Application, Topical, BID  pantoprazole, 40 mg, oral, Daily before breakfast  polyethylene glycol, 17 g, oral, Daily  sulfaSALAzine, 1,000 mg, oral, BID  tamsulosin, 0.4 mg, oral, Daily

## 2025-04-27 NOTE — CARE PLAN
Problem: Safety - Adult  Goal: Free from fall injury  Outcome: Progressing   The patient's goals for the shift include pain relief    The clinical goals for the shift include pt to remain free from falls until end of shift

## 2025-04-28 PROCEDURE — 2500000005 HC RX 250 GENERAL PHARMACY W/O HCPCS: Performed by: EMERGENCY MEDICINE

## 2025-04-28 PROCEDURE — 2500000004 HC RX 250 GENERAL PHARMACY W/ HCPCS (ALT 636 FOR OP/ED)

## 2025-04-28 PROCEDURE — 97530 THERAPEUTIC ACTIVITIES: CPT | Mod: GP

## 2025-04-28 PROCEDURE — 2500000001 HC RX 250 WO HCPCS SELF ADMINISTERED DRUGS (ALT 637 FOR MEDICARE OP)

## 2025-04-28 PROCEDURE — 97535 SELF CARE MNGMENT TRAINING: CPT | Mod: GO

## 2025-04-28 PROCEDURE — 2500000002 HC RX 250 W HCPCS SELF ADMINISTERED DRUGS (ALT 637 FOR MEDICARE OP, ALT 636 FOR OP/ED): Performed by: INTERNAL MEDICINE

## 2025-04-28 PROCEDURE — 99233 SBSQ HOSP IP/OBS HIGH 50: CPT | Performed by: INTERNAL MEDICINE

## 2025-04-28 PROCEDURE — G0378 HOSPITAL OBSERVATION PER HR: HCPCS

## 2025-04-28 PROCEDURE — 2500000002 HC RX 250 W HCPCS SELF ADMINISTERED DRUGS (ALT 637 FOR MEDICARE OP, ALT 636 FOR OP/ED)

## 2025-04-28 RX ORDER — METHOTREXATE 2.5 MG/1
15 TABLET ORAL WEEKLY
Status: DISCONTINUED | OUTPATIENT
Start: 2025-04-28 | End: 2025-05-01 | Stop reason: HOSPADM

## 2025-04-28 RX ORDER — ACETAMINOPHEN 325 MG/1
650 TABLET ORAL ONCE
Status: COMPLETED | OUTPATIENT
Start: 2025-04-28 | End: 2025-04-28

## 2025-04-28 RX ADMIN — LIDOCAINE 1 PATCH: 4 PATCH TOPICAL at 09:45

## 2025-04-28 RX ADMIN — ACETAMINOPHEN 650 MG: 325 TABLET, FILM COATED ORAL at 10:56

## 2025-04-28 RX ADMIN — METHOTREXATE 15 MG: 2.5 TABLET ORAL at 11:55

## 2025-04-28 RX ADMIN — PANTOPRAZOLE SODIUM 40 MG: 40 TABLET, DELAYED RELEASE ORAL at 05:51

## 2025-04-28 RX ADMIN — APIXABAN 5 MG: 5 TABLET, FILM COATED ORAL at 20:40

## 2025-04-28 RX ADMIN — ACETAMINOPHEN 975 MG: 325 TABLET, FILM COATED ORAL at 22:16

## 2025-04-28 RX ADMIN — LEVOTHYROXINE SODIUM 25 MCG: 25 TABLET ORAL at 22:17

## 2025-04-28 RX ADMIN — LISINOPRIL 40 MG: 40 TABLET ORAL at 20:41

## 2025-04-28 RX ADMIN — DOXAZOSIN 1 MG: 2 TABLET ORAL at 09:45

## 2025-04-28 RX ADMIN — ACETAMINOPHEN 650 MG: 325 TABLET ORAL at 11:55

## 2025-04-28 RX ADMIN — APIXABAN 5 MG: 5 TABLET, FILM COATED ORAL at 09:46

## 2025-04-28 RX ADMIN — HYDROXYCHLOROQUINE SULFATE 200 MG: 200 TABLET ORAL at 11:56

## 2025-04-28 RX ADMIN — LISINOPRIL 40 MG: 40 TABLET ORAL at 09:46

## 2025-04-28 RX ADMIN — AMLODIPINE BESYLATE 10 MG: 10 TABLET ORAL at 09:45

## 2025-04-28 RX ADMIN — TAMSULOSIN HYDROCHLORIDE 0.4 MG: 0.4 CAPSULE ORAL at 09:46

## 2025-04-28 RX ADMIN — DOXAZOSIN 1 MG: 2 TABLET ORAL at 20:41

## 2025-04-28 RX ADMIN — NYSTATIN 1 APPLICATION: 100000 POWDER TOPICAL at 20:48

## 2025-04-28 RX ADMIN — SULFASALAZINE 1000 MG: 500 TABLET ORAL at 20:40

## 2025-04-28 RX ADMIN — CYCLOBENZAPRINE 5 MG: 10 TABLET, FILM COATED ORAL at 10:55

## 2025-04-28 RX ADMIN — FUROSEMIDE 40 MG: 40 TABLET ORAL at 09:46

## 2025-04-28 RX ADMIN — HYDROXYCHLOROQUINE SULFATE 200 MG: 200 TABLET ORAL at 22:00

## 2025-04-28 RX ADMIN — SULFASALAZINE 1000 MG: 500 TABLET ORAL at 09:46

## 2025-04-28 ASSESSMENT — COGNITIVE AND FUNCTIONAL STATUS - GENERAL
TOILETING: A LOT
HELP NEEDED FOR BATHING: A LOT
PERSONAL GROOMING: A LITTLE
TURNING FROM BACK TO SIDE WHILE IN FLAT BAD: A LOT
CLIMB 3 TO 5 STEPS WITH RAILING: TOTAL
DRESSING REGULAR LOWER BODY CLOTHING: A LOT
MOVING FROM LYING ON BACK TO SITTING ON SIDE OF FLAT BED WITH BEDRAILS: A LOT
DRESSING REGULAR UPPER BODY CLOTHING: A LITTLE
STANDING UP FROM CHAIR USING ARMS: A LOT
WALKING IN HOSPITAL ROOM: A LITTLE
MOVING TO AND FROM BED TO CHAIR: A LOT
DAILY ACTIVITIY SCORE: 16
MOBILITY SCORE: 12

## 2025-04-28 ASSESSMENT — PAIN SCALES - GENERAL
PAINLEVEL_OUTOF10: 0 - NO PAIN
PAINLEVEL_OUTOF10: 3
PAINLEVEL_OUTOF10: 0 - NO PAIN

## 2025-04-28 ASSESSMENT — PAIN DESCRIPTION - ORIENTATION: ORIENTATION: LEFT

## 2025-04-28 ASSESSMENT — PAIN DESCRIPTION - LOCATION: LOCATION: HIP

## 2025-04-28 ASSESSMENT — PAIN - FUNCTIONAL ASSESSMENT
PAIN_FUNCTIONAL_ASSESSMENT: 0-10

## 2025-04-28 ASSESSMENT — ACTIVITIES OF DAILY LIVING (ADL): HOME_MANAGEMENT_TIME_ENTRY: 15

## 2025-04-28 NOTE — PROGRESS NOTES
Occupational Therapy    OT Treatment    Patient Name: Jing Bae  MRN: [id number]  Department: [facility]  Room: [facility]706-A  Today's Date: 4/28/2025  Time Calculation  Start Time: 1357  Stop Time: 1427  Time Calculation (min): 30 min        Assessment:  OT Assessment: Pt continues to demonstrate poor activity tolerance, decreased endurance, and strength which impacts her ADL/functional transfer status. Pt is unsafe to return home at this time. Frequency updated this date to allow patient to continue to progress towards OT goals. Pt would benefit from continued therapy at a moderate intensity to maximize IND and safety prior to home going.  Evaluation/Treatment Tolerance: Patient limited by fatigue  End of Session Communication: Bedside nurse  End of Session Patient Position: Up in chair, Alarm off, not on at start of session  Evaluation/Treatment Tolerance: Patient limited by fatigue  Plan:  Treatment Interventions: ADL retraining, Functional transfer training, Neuromuscular reeducation, Compensatory technique education  OT Frequency: 5 times per week  OT Discharge Recommendations: Moderate intensity level of continued care  OT - OK to Discharge: Yes (from an O.T. standpoint)  Treatment Interventions: ADL retraining, Functional transfer training, Neuromuscular reeducation, Compensatory technique education    Subjective   Previous Visit Info:  OT Last Visit  OT Received On: 04/28/25  General:  General  Reason for Referral: OT eval & treat for ADLs/safety  Referred By: Blake Allen DO  Past Medical History Relevant to Rehab: 4/21/25: lumbar strain, low back pain, unable to get out of bed.   CT lumbar spine: degenerative changesno acute findings;  PMH: A fib, CAD, CKD, HTN, HLD, hypothyroidism, aortic stenosis, sinus bradycardia, MARTA, on CPAP,  RA, morbid obesity  Family/Caregiver Present: Yes  Caregiver Feedback: daughter present and supportive  Co-Treatment: PT  Co-Treatment Reason: to maximize pt function and  safety  Prior to Session Communication: Bedside nurse  Patient Position Received: Up in chair, Alarm off, not on at start of session  General Comment: Pt pleasant and agreeable to OT treatment.  Precautions:  Medical Precautions: Fall precautions            Pain:  Pain Assessment  Pain Assessment: 0-10  0-10 (Numeric) Pain Score:  (back pain with movement, states tolerable but did not rate)  Pain Interventions: Repositioned    Objective    Cognition:  Cognition  Overall Cognitive Status: Within Functional Limits  Orientation Level: Oriented X4    Activities of Daily Living: Grooming  Grooming Comments: Pt stood at FWW at sink for ~1 minute to wash hands with heavy UE reliance from sink with MIN A for standing balance. Pt fatigued quickly and was unable to maintain stand without UE support.  Functional Standing Tolerance:     Bed Mobility/Transfers: Bed Mobility  Bed Mobility:  (Transition from supine <> sit EOB via log roll technique with MAX A of one to manage BLE in/out of bed and MOD A of another to lower/elevate trunk. Cues for log roll technique with use of bed rail throughout.)    Transfers  Transfer:  (x2 sit to stand from chair and bed using FWW with MOD Ax2. Cues for hand placement and technique.)      Functional Mobility:  Functional Mobility  Functional Mobility Performed:  (Pt completed functional mobility throughout room using FWW with MIN Ax1, cues for sequencing and technique. Fatigues quickly, requires increased time with shuffled gait.)  Sitting Balance:  Static Sitting Balance  Static Sitting-Comment/Number of Minutes: Fair  Dynamic Sitting Balance  Dynamic Sitting-Comments: Fair-  Standing Balance:  Static Standing Balance  Static Standing-Balance Support: Bilateral upper extremity supported  Static Standing-Comment/Number of Minutes: Fair-  Dynamic Standing Balance  Dynamic Standing-Balance Support: Bilateral upper extremity supported  Dynamic Standing-Comments: Fair-    Outcome Measures:[facility]  Daily Activity  Putting on and taking off regular lower body clothing: A lot  Bathing (including washing, rinsing, drying): A lot  Putting on and taking off regular upper body clothing: A little  Toileting, which includes using toilet, bedpan or urinal: A lot  Taking care of personal grooming such as brushing teeth: A little  Eating Meals: None  Daily Activity - Total Score: 16    Education Documentation  Body Mechanics, taught by Milady Oneill OT at 4/28/2025  2:53 PM.  Learner: Patient  Readiness: Acceptance  Method: Explanation  Response: Verbalizes Understanding, Demonstrated Understanding, Needs Reinforcement  Comment: Techniques and safety during ADLs, functional transfers, bed mobility    ADL Training, taught by Milady Oneill OT at 4/28/2025  2:53 PM.  Learner: Patient  Readiness: Acceptance  Method: Explanation  Response: Verbalizes Understanding, Demonstrated Understanding, Needs Reinforcement  Comment: Techniques and safety during ADLs, functional transfers, bed mobility      IP EDUCATION:  Education  Education Comment: Encouraged pt to use bed rail to assist with log rolling and bed mobility.    Goals:  Encounter Problems       Encounter Problems (Active)       OT Goals       Increase LE bathing, dressing & toileting to supervision with adaptive equipment as needed.  (Progressing)       Start:  04/22/25    Expected End:  05/06/25            Increase bed mobility, functional transfers and functional mobility to/from bed, chair & commode to supervision with DME for safety  (Progressing)       Start:  04/22/25    Expected End:  05/06/25            Demonstrate compliance to body mechanics and safety techs during ADLs   (Progressing)       Start:  04/22/25    Expected End:  05/06/25

## 2025-04-28 NOTE — PROGRESS NOTES
Internal Medicine Progress Note         Jing Bae is a 84 y.o. female on day 0 of admission presenting with Lumbar strain, initial encounter.    SUBJECTIVE    Perry catheter was placed yesterday.  Patient notes she feels better continue encouraging out of bed as tolerated and ambulation      OBJECTIVE    Vitals:    04/27/25 1935 04/28/25 0403 04/28/25 0424 04/28/25 0722   BP: 146/58 170/72  167/70   BP Location: Right arm   Right arm   Patient Position: Lying   Lying   Pulse: 54 (!) 46 60 53   Resp: 22 22 18   Temp: 35.8 °C (96.4 °F) 36 °C (96.8 °F)  36.4 °C (97.5 °F)   TempSrc: Temporal   Temporal   SpO2: 93% 93%  91%   Weight:       Height:          Results from last 7 days   Lab Units 04/26/25  0608 04/22/25  0553 04/21/25  1217   WBC AUTO x10*3/uL 5.3   < > 5.2   HEMOGLOBIN g/dL 12.7   < > 12.9   HEMATOCRIT % 40.4   < > 42.7   PLATELETS AUTO x10*3/uL 146*   < > 113*   NEUTROS PCT AUTO %  --   --  76.8   LYMPHS PCT AUTO %  --   --  13.1   MONOS PCT AUTO %  --   --  8.3   EOS PCT AUTO %  --   --  1.0    < > = values in this interval not displayed.     Results from last 7 days   Lab Units 04/26/25  0608 04/21/25  1357 04/21/25  1217   SODIUM mmol/L 140   < > 136   POTASSIUM mmol/L 4.2   < > 7.3*   CHLORIDE mmol/L 100   < > 104   CO2 mmol/L 33*   < > 23   BUN mg/dL 32*   < > 33*   CREATININE mg/dL 1.22*   < > 1.16*   CALCIUM mg/dL 8.9   < > 9.2   PROTEIN TOTAL g/dL  --   --  7.8   BILIRUBIN TOTAL mg/dL  --   --  0.7   ALK PHOS U/L  --   --  65   ALT U/L  --   --  15   AST U/L  --   --  57*   GLUCOSE mg/dL 94   < > 117*    < > = values in this interval not displayed.       Scheduled Medications  Scheduled Medications[1]       Physical Exam  Physical Exam:  General:  Pleasant and cooperative.  Obese female.  Agitated however answering questions appropriately  HEENT:  Normocephalic, atraumatic, mucus membranes moist.   Neck:  Trachea midline.   No JVD.    Chest:  Clear to auscultation bilaterally. No wheezes, rales, or rhonchi.  CV:  Regular rate and rhythm.  Positive S1/S2.   Abdomen: Bowel sounds present in all four quadrants, abdomen is soft, non-tender, non-distended.  Extremities:  No lower extremity edema or cyanosis.   Neurological:  AAOx3. No focal deficits.  Skin:  Warm and dry.                 ASSESSMENT & PLAN    Lumbosacral strain  Paraspinal muscle tenderness  Morbid obesity  Ambulatory dysfunction secondary to above  History of rheumatoid arthritis  History of chronic degenerative joint disease    PLAN:  -Continue scheduled Tylenol, Flexeril 3 times daily as needed, lidocaine patch  - Patient did not want to take gabapentin and therefore was discontinued.  - Patient takes oxycodone 5 mg as needed for severe pain  - Emphasized the importance of trying to ambulate to the best of her ability.  Discussed with nursing staff to try to get patient to walk today in the hospital halls.  - Continue with Medrol Dosepak.  Last day 4/26  - Continue patient's home medications for rheumatoid arthritis  - MRI showed degenerative changes in lower back.  Moderate spinal stenosis, narrowing of the subarticular recess and moderate bilateral neural chaney stenosis  - Referral to neurosurgery in the outpatient setting      CKD stage III  Acute urinary retention     PLAN:  - Monitor patient's creatinine.  Slight increase in patient's creatinine from baseline 1.2 from 1.16.  Will monitor closely.  Do not think she is having any hydronephrosis secondary to urinary retention.  - Toradol and NSAIDs as needed while patient's creatinine is at baseline.  - Urology consulted because patient continues to have persistent urinary retention.  Urology recommended Perry catheter in place for at least 4 weeks and follow-up outpatient.      CAD  A-fib on Eliquis  Hypertension  Hyperlipidemia  PLAN:  - Continue anticoagulation  - Continue amlodipine and lisinopril  - Continue  diuretic  - Continue statin    Constipation    PLAN  -Continue with MiraLAX, Colace, suppositories.  - Encourage ambulation as this can help facilitate having a bowel movement       Chronic Conditions  Hypothyroidism-continue Synthroid  MARTA-continue CPAP        DVT ppx: Eliquis  GI ppx: Protonix  IVF: None  Diet: Regular diet  Consults: PT/OT  CODE STATUS: Full code  Dispo: Awaiting acceptance from a rehab facility prior to discharge.    Blake Allen, DO   Please SecureChat for any further questions  This is a preliminary note, please await attending attestation for final A/P          [1] acetaminophen, 650 mg, oral, Once  acetaminophen, 975 mg, oral, BID  amLODIPine, 10 mg, oral, q AM  apixaban, 5 mg, oral, BID  doxazosin, 1 mg, oral, BID  furosemide, 40 mg, oral, Daily  hydroxychloroquine, 200 mg, oral, q12h  levothyroxine, 25 mcg, oral, Nightly  lidocaine, 1 patch, transdermal, Daily  lisinopril, 40 mg, oral, BID  methotrexate, 15 mg, oral, Weekly  nystatin, 1 Application, Topical, BID  pantoprazole, 40 mg, oral, Daily before breakfast  polyethylene glycol, 17 g, oral, Daily  sulfaSALAzine, 1,000 mg, oral, BID  tamsulosin, 0.4 mg, oral, Daily

## 2025-04-28 NOTE — PROGRESS NOTES
P2P called by attending and denied. Daughter to call and start an appeal. PT/OT re-eval will be faxed to [facility] at [phone].

## 2025-04-28 NOTE — CONSULTS
Reason For Consult  Urinary retention and overflow incontinence    History Of Present Illness  Jing Bae is a 84 y.o. female presenting with she is currently admitted to [facility] for lumbar strain.  She has chronic low back pain.  She states that she has a history of having a sensation to void and occasionally has leaking on her way to the bathroom.  She states that she had been voiding and voided output was 3 to 400 cc in the 1 day straight catheter she had over 900 cc residual.  She now has a Perry catheter and is tolerating the catheter well.  She is a patient well-known to Dr. Tiwari however she was worked up in the past for hematuria and had not mentioned her bladder issues in the past.     Past Medical History  She has a past medical history of Aortic stenosis (12/03/2023), Atrial fibrillation (Multi) (12/03/2023), Bradycardia (12/03/2023), Chest tightness (05/23/2024), Chronic heart failure with preserved ejection fraction (03/06/2025), Chronic kidney disease (CKD) (12/03/2023), Coronary artery calcification (05/23/2024), Coronary artery disease involving native coronary artery of native heart without angina pectoris (05/23/2024), Essential (primary) hypertension, Hyperlipidemia (10/25/2023), Hypertension (12/03/2023), Localized edema (09/21/2022), Mitral valve regurgitation (12/03/2023), Obstructive sleep apnea syndrome (12/03/2023), MARTA on CPAP (12/03/2023), and Paroxysmal atrial fibrillation (Multi) (12/03/2023).    Surgical History  She has a past surgical history that includes Hysterectomy.     Social History  She reports that she quit smoking about 27 years ago. Her smoking use included cigarettes. She started smoking about 77 years ago. She has a 50 pack-year smoking history. She has been exposed to tobacco smoke. She has never used smokeless tobacco. No history on file for alcohol use and drug use.    Family History  Family History[1]     Allergies  Bactrim  "[sulfamethoxazole-trimethoprim], Ciprofloxacin, Ibuprofen, Penicillins, Rosuvastatin, Statins-hmg-coa reductase inhibitors, Sulfa (sulfonamide antibiotics), Trimethoprim, and Zoster vaccine live (pf)    Review of Systems  Reviewed the H&P and has been no change     Physical Exam  She is alert and orient x 3 no acute distress  Perry to continuous drainage with urine clear     Last Recorded Vitals  Blood pressure 167/70, pulse 53, temperature 36.4 °C (97.5 °F), temperature source Temporal, resp. rate 18, height 1.651 m (5' 5\"), weight 109 kg (240 lb 4.8 oz), SpO2 91%.    Relevant Results      .Scheduled Medications[2]     .  Results for orders placed or performed during the hospital encounter of 04/21/25 (from the past 96 hours)   Magnesium   Result Value Ref Range    Magnesium 2.14 1.60 - 2.40 mg/dL   Renal Function Panel   Result Value Ref Range    Glucose 95 74 - 99 mg/dL    Sodium 139 136 - 145 mmol/L    Potassium 3.9 3.5 - 5.3 mmol/L    Chloride 102 98 - 107 mmol/L    Bicarbonate 30 21 - 32 mmol/L    Anion Gap 11 10 - 20 mmol/L    Urea Nitrogen 27 (H) 6 - 23 mg/dL    Creatinine 1.16 (H) 0.50 - 1.05 mg/dL    eGFR 47 (L) >60 mL/min/1.73m*2    Calcium 9.3 8.6 - 10.3 mg/dL    Phosphorus 4.0 2.5 - 4.9 mg/dL    Albumin 4.3 3.4 - 5.0 g/dL   CBC   Result Value Ref Range    WBC 6.0 4.4 - 11.3 x10*3/uL    nRBC 0.0 0.0 - 0.0 /100 WBCs    RBC 4.41 4.00 - 5.20 x10*6/uL    Hemoglobin 13.6 12.0 - 16.0 g/dL    Hematocrit 44.7 36.0 - 46.0 %     (H) 80 - 100 fL    MCH 30.8 26.0 - 34.0 pg    MCHC 30.4 (L) 32.0 - 36.0 g/dL    RDW 14.6 (H) 11.5 - 14.5 %    Platelets 156 150 - 450 x10*3/uL   Magnesium   Result Value Ref Range    Magnesium 2.41 (H) 1.60 - 2.40 mg/dL   Renal Function Panel   Result Value Ref Range    Glucose 94 74 - 99 mg/dL    Sodium 140 136 - 145 mmol/L    Potassium 4.2 3.5 - 5.3 mmol/L    Chloride 100 98 - 107 mmol/L    Bicarbonate 33 (H) 21 - 32 mmol/L    Anion Gap 11 10 - 20 mmol/L    Urea Nitrogen 32 (H) 6 " - 23 mg/dL    Creatinine 1.22 (H) 0.50 - 1.05 mg/dL    eGFR 44 (L) >60 mL/min/1.73m*2    Calcium 8.9 8.6 - 10.3 mg/dL    Phosphorus 4.1 2.5 - 4.9 mg/dL    Albumin 3.9 3.4 - 5.0 g/dL   CBC   Result Value Ref Range    WBC 5.3 4.4 - 11.3 x10*3/uL    nRBC 0.0 0.0 - 0.0 /100 WBCs    RBC 4.04 4.00 - 5.20 x10*6/uL    Hemoglobin 12.7 12.0 - 16.0 g/dL    Hematocrit 40.4 36.0 - 46.0 %     80 - 100 fL    MCH 31.4 26.0 - 34.0 pg    MCHC 31.4 (L) 32.0 - 36.0 g/dL    RDW 14.8 (H) 11.5 - 14.5 %    Platelets 146 (L) 150 - 450 x10*3/uL         Assessment/Plan     84-year-old female with urinary retention and overflow incontinence.  - Will maintain the Perry catheter for at least 4 weeks.  - Recommend following up as an outpatient for urodynamic studies.    Thank you for allowing us to participate in her care    I spent 30 minutes in the professional and overall care of this patient.      Fortino Mcfarlane PA-C         [1]   Family History  Problem Relation Name Age of Onset    Ovarian cancer Father's Sister     [2] acetaminophen, 975 mg, oral, BID  amLODIPine, 10 mg, oral, q AM  apixaban, 5 mg, oral, BID  doxazosin, 1 mg, oral, BID  furosemide, 40 mg, oral, Daily  hydroxychloroquine, 200 mg, oral, q12h  levothyroxine, 25 mcg, oral, Nightly  lidocaine, 1 patch, transdermal, Daily  lisinopril, 40 mg, oral, BID  methotrexate, 15 mg, oral, Weekly  nystatin, 1 Application, Topical, BID  pantoprazole, 40 mg, oral, Daily before breakfast  polyethylene glycol, 17 g, oral, Daily  sulfaSALAzine, 1,000 mg, oral, BID  tamsulosin, 0.4 mg, oral, Daily

## 2025-04-28 NOTE — CARE PLAN
The patient's goals for the shift include pain relief    The clinical goals for the shift include Patient will remain free from injury

## 2025-04-28 NOTE — PROGRESS NOTES
Physical Therapy    Physical Therapy Treatment    Patient Name: Jing Bae  MRN: [id number]  Today's Date: 4/28/2025  Time Calculation  Start Time: 1358  Stop Time: 1428  Time Calculation (min): 30 min     706/706-A    Assessment/Plan   PT Assessment  PT Assessment Results: Decreased strength, Decreased range of motion, Impaired balance, Decreased endurance, Decreased mobility, Pain  Rehab Prognosis: Good  Barriers to Discharge Home: Caregiver assistance, Physical needs  Caregiver Assistance: Patient lives alone and/or does not have reliable caregiver assistance  Physical Needs: Stair navigation into home limited by function/safety, In-home setup navigation limited by function/safety, 24hr mobility assistance needed  Evaluation/Treatment Tolerance: Patient limited by fatigue, Patient limited by pain  Strengths: Ability to acquire knowledge, Capable of completing ADLs semi/independent  Barriers to Participation: Comorbidities  End of Session Communication: Bedside nurse  Assessment Comment: Pt continues to demo decreased strength, endurance and activity tolerance. Pt with difficulty completing transfers and bed mobility with this session focussing on techniques to assist with correct body mechanics. Pt is unsafe to return home alone and is a high fall risk. Pt would continue to benefit from moderate intensity therapy. Frequency updated this date to allow patient to continue to progress towards PT goals.  End of Session Patient Position: Up in chair, Alarm off, not on at start of session  PT Plan  Inpatient/Swing Bed or Outpatient: Inpatient  PT Plan  Treatment/Interventions: Bed mobility, Transfer training, Gait training, Balance training, Strengthening, Endurance training, Range of motion, Therapeutic exercise, Therapeutic activity, Home exercise program, Positioning  PT Plan: Ongoing PT  PT Frequency: 5 times per week  PT Discharge Recommendations: Moderate intensity level of continued care  PT - OK to  Discharge: Yes (once medically cleared for next level of care)    Current Problem:  Problem List[1]    General Visit Information:   PT  Visit  PT Received On: 04/28/25  Response to Previous Treatment: Patient with no complaints from previous session.  General  Reason for Referral: PT eval and treat; impaired mobility  Referred By: Blake Allen DO  Past Medical History Relevant to Rehab: 4/21/25: lumbar strain, low back pain, unable to get out of bed.   CT lumbar spine: degenerative changesno acute findings;  PMH: A fib, CAD, CKD, HTN, HLD, hypothyroidism, aortic stenosis, sinus bradycardia, MARTA, on CPAP,  RA, morbid obesity  Co-Treatment: OT  Co-Treatment Reason: to maximize safety and participation  Prior to Session Communication: Bedside nurse  Patient Position Received: Up in chair, Alarm off, not on at start of session  General Comment: Pt pleasant and agreeable to therapy.  Subjective     Precautions:  Precautions  Medical Precautions: Fall precautions  Precautions Comment: OOB with assist     Objective     Pain:  Pain Assessment  Pain Assessment: 0-10  0-10 (Numeric) Pain Score:  (Pt has no pain at rest and increased with mobility, however states it is still tolerable <5/10. Repositioned for comfort at end of session.)  Pain Type: Acute pain  Pain Location: Back  Pain Orientation: Left, Lower  Pain Interventions: Repositioned, Ambulation/increased activity  Response to Interventions: Content/relaxed    Cognition:  Cognition  Overall Cognitive Status: Within Functional Limits  Orientation Level: Oriented X4    Extremity/Trunk Assessments:     RLE   RLE : Exceptions to WFL (3/5)  LLE   LLE : Exceptions to WFL (3-/5)    Activity Tolerance:  Activity Tolerance  Endurance: Decreased tolerance for upright activites  Activity Tolerance Comments: Pt limited by rapid fatigue and LE weakness. Increased low back pain with poor standing tolerance.    Treatments:     Bed Mobility  Bed Mobility:  (completed supine <> sit  with step by step logroll technique practice. Pt with poor trunk stability and LE weakness. Requires maxA x1 for LE support and ModA x1 for trunk control. Demos good carryover with logroll. Educated on use of bedrail for assist.)  Ambulation/Gait Training  Ambulation/Gait Training Performed:  (completed ~20'x2 with standing rest break at sink and requires Michelle x1 for safety. Demos short shuffling steps. Educated on step to pattern for LLE pain and weakness. Rapid fatigue and deconditioning.)  Transfers  Transfer:  (from chair to FWW with mod Ax2 and cues for hand placement and LE positioning for support. Use of momentum to complete. From EOB to FWW with modA x2 and bed elevated.)     Outcome Measures:     [facility] Basic Mobility  Turning from your back to your side while in a flat bed without using bedrails: A lot  Moving from lying on your back to sitting on the side of a flat bed without using bedrails: A lot  Moving to and from bed to chair (including a wheelchair): A lot  Standing up from a chair using your arms (e.g. wheelchair or bedside chair): A lot  To walk in hospital room: A little  Climbing 3-5 steps with railing: Total  Basic Mobility - Total Score: 12           Education Documentation  Precautions, taught by Rosaura Juarez PT at 4/28/2025  2:50 PM.  Learner: Family, Patient  Readiness: Acceptance  Method: Explanation  Response: Verbalizes Understanding, Needs Reinforcement  Comment: bed mobility logroll technique; energy conservation; adaptive equipment education    Body Mechanics, taught by Rosaura Juarez PT at 4/28/2025  2:50 PM.  Learner: Family, Patient  Readiness: Acceptance  Method: Explanation  Response: Verbalizes Understanding, Needs Reinforcement  Comment: bed mobility logroll technique; energy conservation; adaptive equipment education    Mobility Training, taught by Rosaura Juarez PT at 4/28/2025  2:50 PM.  Learner: Family, Patient  Readiness: Acceptance  Method: Explanation  Response:  Verbalizes Understanding, Needs Reinforcement  Comment: bed mobility logroll technique; energy conservation; adaptive equipment education    Education Comments  No comments found.         EDUCATION:     Encounter Problems       Encounter Problems (Active)       PT Problem       PT Goal 1 STG - Pt will transition supine <> sitting with MOD I  (Progressing)       Start:  04/22/25    Expected End:  05/06/25            PT Goal 2 STG - Pt will transfer STS with MOD I  (Progressing)       Start:  04/22/25    Expected End:  05/06/25            PT Goal 3 STG - Pt will amb 75' using FWW with MOD I  (Progressing)       Start:  04/22/25    Expected End:  05/06/25            PT Goal 4 STG - Pt will perform a B LE ther ex program of 2-3 sets of 10  (Progressing)       Start:  04/22/25    Expected End:  05/06/25               Pain - Adult                  [1]   Patient Active Problem List  Diagnosis    Paroxysmal atrial fibrillation (Multi)    Bradycardia    Chronic kidney disease (CKD)    Edema leg    Fatigue    Hypertension    Hypothyroidism    Interstitial lung disease (Multi)    Lower back pain    Class 2 obesity with body mass index (BMI) of 39.0 to 39.9 in adult    Osteoporosis    Pulmonary vascular congestion    Seronegative rheumatoid arthritis (Multi)    Thrombocytopenia (CMS-HCC)    Vaccine counseling    Chondrocalcinosis    Anemia    Mitral valve regurgitation    Aortic stenosis    Obstructive sleep apnea syndrome    Shortness of breath at rest    Arthralgia of hip    Left ventricular systolic dysfunction (LVSD)    Localized edema    Hyperlipidemia    Lung nodule    Candidiasis of skin    Coronary artery disease involving native coronary artery of native heart without angina pectoris    Chest tightness    Chronic heart failure with preserved ejection fraction    Lumbar strain, initial encounter

## 2025-04-28 NOTE — CARE PLAN
The clinical goals for the shift include maintain safety and comfort      Problem: Pain - Adult  Goal: Verbalizes/displays adequate comfort level or baseline comfort level  Outcome: Progressing     Problem: Safety - Adult  Goal: Free from fall injury  Outcome: Progressing     Problem: Discharge Planning  Goal: Discharge to home or other facility with appropriate resources  Outcome: Progressing     Problem: Chronic Conditions and Co-morbidities  Goal: Patient's chronic conditions and co-morbidity symptoms are monitored and maintained or improved  Outcome: Progressing     Problem: Nutrition  Goal: Nutrient intake appropriate for maintaining nutritional needs  Outcome: Progressing     Problem: Pain  Goal: Takes deep breaths with improved pain control throughout the shift  Outcome: Progressing  Goal: Turns in bed with improved pain control throughout the shift  Outcome: Progressing  Goal: Walks with improved pain control throughout the shift  Outcome: Progressing  Goal: Performs ADL's with improved pain control throughout shift  Outcome: Progressing  Goal: Participates in PT with improved pain control throughout the shift  Outcome: Progressing  Goal: Free from opioid side effects throughout the shift  Outcome: Progressing  Goal: Free from acute confusion related to pain meds throughout the shift  Outcome: Progressing     Problem: Skin  Goal: Decreased wound size/increased tissue granulation at next dressing change  Outcome: Progressing  Goal: Participates in plan/prevention/treatment measures  Outcome: Progressing  Goal: Prevent/manage excess moisture  Outcome: Progressing  Goal: Prevent/minimize sheer/friction injuries  Outcome: Progressing  Goal: Promote/optimize nutrition  Outcome: Progressing  Goal: Promote skin healing  Outcome: Progressing

## 2025-04-29 LAB
ALBUMIN SERPL BCP-MCNC: 3.9 G/DL (ref 3.4–5)
ANION GAP SERPL CALC-SCNC: 11 MMOL/L (ref 10–20)
BUN SERPL-MCNC: 25 MG/DL (ref 6–23)
CALCIUM SERPL-MCNC: 8.9 MG/DL (ref 8.6–10.3)
CHLORIDE SERPL-SCNC: 99 MMOL/L (ref 98–107)
CO2 SERPL-SCNC: 30 MMOL/L (ref 21–32)
CREAT SERPL-MCNC: 1.09 MG/DL (ref 0.5–1.05)
EGFRCR SERPLBLD CKD-EPI 2021: 51 ML/MIN/1.73M*2
ERYTHROCYTE [DISTWIDTH] IN BLOOD BY AUTOMATED COUNT: 14.6 % (ref 11.5–14.5)
GLUCOSE SERPL-MCNC: 97 MG/DL (ref 74–99)
HCT VFR BLD AUTO: 43 % (ref 36–46)
HGB BLD-MCNC: 13.6 G/DL (ref 12–16)
MAGNESIUM SERPL-MCNC: 2.28 MG/DL (ref 1.6–2.4)
MCH RBC QN AUTO: 31.5 PG (ref 26–34)
MCHC RBC AUTO-ENTMCNC: 31.6 G/DL (ref 32–36)
MCV RBC AUTO: 100 FL (ref 80–100)
NRBC BLD-RTO: 0 /100 WBCS (ref 0–0)
PHOSPHATE SERPL-MCNC: 4.2 MG/DL (ref 2.5–4.9)
PLATELET # BLD AUTO: 149 X10*3/UL (ref 150–450)
POTASSIUM SERPL-SCNC: 4.2 MMOL/L (ref 3.5–5.3)
RBC # BLD AUTO: 4.32 X10*6/UL (ref 4–5.2)
SODIUM SERPL-SCNC: 136 MMOL/L (ref 136–145)
WBC # BLD AUTO: 4.8 X10*3/UL (ref 4.4–11.3)

## 2025-04-29 PROCEDURE — G0378 HOSPITAL OBSERVATION PER HR: HCPCS

## 2025-04-29 PROCEDURE — 99232 SBSQ HOSP IP/OBS MODERATE 35: CPT

## 2025-04-29 PROCEDURE — 2500000005 HC RX 250 GENERAL PHARMACY W/O HCPCS: Performed by: EMERGENCY MEDICINE

## 2025-04-29 PROCEDURE — 36415 COLL VENOUS BLD VENIPUNCTURE: CPT

## 2025-04-29 PROCEDURE — 2500000002 HC RX 250 W HCPCS SELF ADMINISTERED DRUGS (ALT 637 FOR MEDICARE OP, ALT 636 FOR OP/ED)

## 2025-04-29 PROCEDURE — 2500000004 HC RX 250 GENERAL PHARMACY W/ HCPCS (ALT 636 FOR OP/ED)

## 2025-04-29 PROCEDURE — 80069 RENAL FUNCTION PANEL: CPT

## 2025-04-29 PROCEDURE — 2500000002 HC RX 250 W HCPCS SELF ADMINISTERED DRUGS (ALT 637 FOR MEDICARE OP, ALT 636 FOR OP/ED): Performed by: INTERNAL MEDICINE

## 2025-04-29 PROCEDURE — 85027 COMPLETE CBC AUTOMATED: CPT

## 2025-04-29 PROCEDURE — 2500000001 HC RX 250 WO HCPCS SELF ADMINISTERED DRUGS (ALT 637 FOR MEDICARE OP)

## 2025-04-29 PROCEDURE — 2500000001 HC RX 250 WO HCPCS SELF ADMINISTERED DRUGS (ALT 637 FOR MEDICARE OP): Performed by: INTERNAL MEDICINE

## 2025-04-29 PROCEDURE — 83735 ASSAY OF MAGNESIUM: CPT

## 2025-04-29 RX ORDER — TRAMADOL HYDROCHLORIDE 50 MG/1
50 TABLET ORAL EVERY 8 HOURS PRN
Status: DISCONTINUED | OUTPATIENT
Start: 2025-04-29 | End: 2025-05-01 | Stop reason: HOSPADM

## 2025-04-29 RX ADMIN — DOXAZOSIN 1 MG: 2 TABLET ORAL at 09:15

## 2025-04-29 RX ADMIN — AMLODIPINE BESYLATE 10 MG: 10 TABLET ORAL at 09:12

## 2025-04-29 RX ADMIN — POLYETHYLENE GLYCOL 3350 17 G: 17 POWDER, FOR SOLUTION ORAL at 09:12

## 2025-04-29 RX ADMIN — LISINOPRIL 40 MG: 40 TABLET ORAL at 20:03

## 2025-04-29 RX ADMIN — DOCUSATE SODIUM 100 MG: 100 CAPSULE, LIQUID FILLED ORAL at 06:42

## 2025-04-29 RX ADMIN — LISINOPRIL 40 MG: 40 TABLET ORAL at 09:12

## 2025-04-29 RX ADMIN — DOXAZOSIN 1 MG: 2 TABLET ORAL at 22:00

## 2025-04-29 RX ADMIN — TRAMADOL HYDROCHLORIDE 50 MG: 50 TABLET, COATED ORAL at 06:42

## 2025-04-29 RX ADMIN — TRAMADOL HYDROCHLORIDE 50 MG: 50 TABLET, COATED ORAL at 19:57

## 2025-04-29 RX ADMIN — APIXABAN 5 MG: 5 TABLET, FILM COATED ORAL at 20:03

## 2025-04-29 RX ADMIN — HYDROXYCHLOROQUINE SULFATE 200 MG: 200 TABLET ORAL at 22:00

## 2025-04-29 RX ADMIN — ACETAMINOPHEN 975 MG: 325 TABLET, FILM COATED ORAL at 11:25

## 2025-04-29 RX ADMIN — NYSTATIN 1 APPLICATION: 100000 POWDER TOPICAL at 20:04

## 2025-04-29 RX ADMIN — LIDOCAINE 1 PATCH: 4 PATCH TOPICAL at 09:12

## 2025-04-29 RX ADMIN — ACETAMINOPHEN 975 MG: 325 TABLET, FILM COATED ORAL at 22:39

## 2025-04-29 RX ADMIN — PANTOPRAZOLE SODIUM 40 MG: 40 TABLET, DELAYED RELEASE ORAL at 06:35

## 2025-04-29 RX ADMIN — HYDROXYCHLOROQUINE SULFATE 200 MG: 200 TABLET ORAL at 11:26

## 2025-04-29 RX ADMIN — TAMSULOSIN HYDROCHLORIDE 0.4 MG: 0.4 CAPSULE ORAL at 09:12

## 2025-04-29 RX ADMIN — LEVOTHYROXINE SODIUM 25 MCG: 25 TABLET ORAL at 22:39

## 2025-04-29 RX ADMIN — SULFASALAZINE 1000 MG: 500 TABLET ORAL at 09:13

## 2025-04-29 RX ADMIN — FUROSEMIDE 40 MG: 40 TABLET ORAL at 09:12

## 2025-04-29 RX ADMIN — APIXABAN 5 MG: 5 TABLET, FILM COATED ORAL at 09:12

## 2025-04-29 RX ADMIN — SULFASALAZINE 1000 MG: 500 TABLET ORAL at 20:03

## 2025-04-29 ASSESSMENT — PAIN - FUNCTIONAL ASSESSMENT
PAIN_FUNCTIONAL_ASSESSMENT: 0-10

## 2025-04-29 ASSESSMENT — PAIN SCALES - GENERAL
PAINLEVEL_OUTOF10: 0 - NO PAIN
PAINLEVEL_OUTOF10: 5 - MODERATE PAIN
PAINLEVEL_OUTOF10: 8
PAINLEVEL_OUTOF10: 7
PAINLEVEL_OUTOF10: 0 - NO PAIN

## 2025-04-29 ASSESSMENT — PAIN DESCRIPTION - LOCATION
LOCATION: BACK
LOCATION: BACK

## 2025-04-29 NOTE — CARE PLAN
The patient's goals for the shift include pain relief    The clinical goals for the shift include Patient will remain free from injury      Problem: Pain - Adult  Goal: Verbalizes/displays adequate comfort level or baseline comfort level  Outcome: Progressing     Problem: Safety - Adult  Goal: Free from fall injury  Outcome: Progressing     Problem: Discharge Planning  Goal: Discharge to home or other facility with appropriate resources  Outcome: Progressing     Problem: Chronic Conditions and Co-morbidities  Goal: Patient's chronic conditions and co-morbidity symptoms are monitored and maintained or improved  Outcome: Progressing     Problem: Nutrition  Goal: Nutrient intake appropriate for maintaining nutritional needs  Outcome: Progressing     Problem: Pain  Goal: Takes deep breaths with improved pain control throughout the shift  Outcome: Progressing  Goal: Turns in bed with improved pain control throughout the shift  Outcome: Progressing  Goal: Walks with improved pain control throughout the shift  Outcome: Progressing  Goal: Performs ADL's with improved pain control throughout shift  Outcome: Progressing  Goal: Participates in PT with improved pain control throughout the shift  Outcome: Progressing  Goal: Free from opioid side effects throughout the shift  Outcome: Progressing  Goal: Free from acute confusion related to pain meds throughout the shift  Outcome: Progressing     Problem: Skin  Goal: Decreased wound size/increased tissue granulation at next dressing change  Outcome: Progressing  Flowsheets (Taken 4/29/2025 1101)  Decreased wound size/increased tissue granulation at next dressing change: Promote sleep for wound healing  Goal: Participates in plan/prevention/treatment measures  Outcome: Progressing  Flowsheets (Taken 4/29/2025 1101)  Participates in plan/prevention/treatment measures: Elevate heels  Goal: Prevent/manage excess moisture  Outcome: Progressing  Flowsheets (Taken 4/29/2025  1101)  Prevent/manage excess moisture: Moisturize dry skin  Goal: Prevent/minimize sheer/friction injuries  Outcome: Progressing  Flowsheets (Taken 4/29/2025 1101)  Prevent/minimize sheer/friction injuries: Increase activity/out of bed for meals  Goal: Promote/optimize nutrition  Outcome: Progressing  Flowsheets (Taken 4/29/2025 1101)  Promote/optimize nutrition:   Consume > 50% meals/supplements   Offer water/supplements/favorite foods  Goal: Promote skin healing  Outcome: Progressing  Flowsheets (Taken 4/29/2025 1101)  Promote skin healing: Assess skin/pad under line(s)/device(s)

## 2025-04-29 NOTE — PROGRESS NOTES
Internal Medicine Progress Note         Jing Bae is a 84 y.o. female on day 0 of admission presenting with Lumbar strain, initial encounter.    SUBJECTIVE    Continue with current management.  Patient notes no acute events overnight.  She notes she is doing well.  She was able to stand on her own today without any assistance.  Insurance denied SNF placement.  Peer to peer was also denied.  Family currently doing an appeal      OBJECTIVE    Vitals:    04/28/25 1611 04/28/25 1934 04/29/25 0300 04/29/25 0719   BP: 147/66 156/67 164/74 171/74   BP Location: Right arm Right arm Right arm Right arm   Patient Position: Sitting Lying Lying Lying   Pulse: 56 58 53 62   Resp: 18 18 22 18   Temp: 35.6 °C (96.1 °F) 35.8 °C (96.4 °F) 36.6 °C (97.9 °F) 36.3 °C (97.3 °F)   TempSrc: Temporal Temporal Temporal Temporal   SpO2: 95% 94% 94% 94%   Weight:       Height:          Results from last 7 days   Lab Units 04/29/25  0735   WBC AUTO x10*3/uL 4.8   HEMOGLOBIN g/dL 13.6   HEMATOCRIT % 43.0   PLATELETS AUTO x10*3/uL 149*     Results from last 7 days   Lab Units 04/29/25  0735   SODIUM mmol/L 136   POTASSIUM mmol/L 4.2   CHLORIDE mmol/L 99   CO2 mmol/L 30   BUN mg/dL 25*   CREATININE mg/dL 1.09*   CALCIUM mg/dL 8.9   GLUCOSE mg/dL 97       Scheduled Medications  Scheduled Medications[1]       Physical Exam  Physical Exam:  General:  Pleasant and cooperative.  Obese female.  Agitated however answering questions appropriately  HEENT:  Normocephalic, atraumatic, mucus membranes moist.   Neck:  Trachea midline.  No JVD.    Chest:  Clear to auscultation bilaterally. No wheezes, rales, or rhonchi.  CV:  Regular rate and rhythm.  Positive S1/S2.   Abdomen: Bowel sounds present in all four quadrants, abdomen is soft, non-tender, non-distended.  Extremities:  No lower extremity edema or cyanosis.   Neurological:  AAOx3. No focal deficits.  Skin:  Warm and  dry.                 ASSESSMENT & PLAN    Lumbosacral strain  Paraspinal muscle tenderness  Morbid obesity  Ambulatory dysfunction secondary to above  History of rheumatoid arthritis  History of chronic degenerative joint disease    PLAN:  -Continue scheduled Tylenol, Flexeril 3 times daily as needed, lidocaine patch  - Tramadol added as needed.  Oxycodone held for now especially because patient is having episodes of constipation.  - Patient did not want to take gabapentin and therefore was discontinued.  - Patient takes oxycodone 5 mg as needed for severe pain  - Emphasized the importance of trying to ambulate to the best of her ability.  Discussed with nursing staff to try to get patient to walk today in the hospital halls.  - Continue patient's home medications for rheumatoid arthritis  - MRI showed degenerative changes in lower back.  Moderate spinal stenosis, narrowing of the subarticular recess and moderate bilateral neural chaney stenosis  - Referral to neurosurgery in the outpatient setting      CKD stage III  Acute urinary retention     PLAN:  - Monitor patient's creatinine.  Slight increase in patient's creatinine from baseline 1.2 from 1.16.  Will monitor closely.  Do not think she is having any hydronephrosis secondary to urinary retention.  - Toradol and NSAIDs as needed while patient's creatinine is at baseline.  - Urology consulted because patient continues to have persistent urinary retention.  Urology recommended Perry catheter in place for at least 4 weeks and follow-up outpatient.  - Patient on Flomax and Cardura.  Flomax discontinued due to reduced orthostatic hypotension      CAD  A-fib on Eliquis  Hypertension  Hyperlipidemia  PLAN:  - Continue anticoagulation  - Continue amlodipine and lisinopril  - Continue diuretic  - Continue statin    Constipation    PLAN  -Continue with MiraLAX, Colace, suppositories.  - Encourage ambulation as this can help facilitate having a bowel movement        Chronic Conditions  Hypothyroidism-continue Synthroid  MARTA-continue CPAP        DVT ppx: Eliquis  GI ppx: Protonix  IVF: None  Diet: Regular diet  Consults: PT/OT  CODE STATUS: Full code  Dispo: Awaiting acceptance from a rehab facility prior to discharge.    Blake Allen, DO   Please SecureChat for any further questions  This is a preliminary note, please await attending attestation for final A/P          [1] acetaminophen, 975 mg, oral, BID  amLODIPine, 10 mg, oral, q AM  apixaban, 5 mg, oral, BID  doxazosin, 1 mg, oral, BID  furosemide, 40 mg, oral, Daily  hydroxychloroquine, 200 mg, oral, q12h  levothyroxine, 25 mcg, oral, Nightly  lidocaine, 1 patch, transdermal, Daily  lisinopril, 40 mg, oral, BID  methotrexate, 15 mg, oral, Weekly  nystatin, 1 Application, Topical, BID  pantoprazole, 40 mg, oral, Daily before breakfast  polyethylene glycol, 17 g, oral, Daily  sulfaSALAzine, 1,000 mg, oral, BID

## 2025-04-29 NOTE — PROGRESS NOTES
"Jing Bae is a 84 y.o. female on day 0 of admission presenting with Lumbar strain, initial encounter.    Subjective   Follow-up urinary retention and overflow incontinence  Perry to continuous drainage urine clear.  She is tolerating the Perry catheter well.       Objective     Physical Exam    Last Recorded Vitals  Blood pressure 171/74, pulse 62, temperature 36.3 °C (97.3 °F), temperature source Temporal, resp. rate 18, height 1.651 m (5' 5\"), weight 109 kg (240 lb 4.8 oz), SpO2 94%.  Intake/Output last 3 Shifts:  I/O last 3 completed shifts:  In: 1842 (16.9 mL/kg) [P.O.:1842]  Out: 5659 (51.9 mL/kg) [Urine:5659 (1.4 mL/kg/hr)]  Weight: 109 kg     Relevant Results  Scheduled medications  Scheduled Medications[1]  Continuous medications  Continuous Medications[2]  PRN medications  PRN Medications[3]    Results for orders placed or performed during the hospital encounter of 04/21/25 (from the past 24 hours)   CBC   Result Value Ref Range    WBC 4.8 4.4 - 11.3 x10*3/uL    nRBC 0.0 0.0 - 0.0 /100 WBCs    RBC 4.32 4.00 - 5.20 x10*6/uL    Hemoglobin 13.6 12.0 - 16.0 g/dL    Hematocrit 43.0 36.0 - 46.0 %     80 - 100 fL    MCH 31.5 26.0 - 34.0 pg    MCHC 31.6 (L) 32.0 - 36.0 g/dL    RDW 14.6 (H) 11.5 - 14.5 %    Platelets 149 (L) 150 - 450 x10*3/uL   Magnesium   Result Value Ref Range    Magnesium 2.28 1.60 - 2.40 mg/dL   Renal Function Panel   Result Value Ref Range    Glucose 97 74 - 99 mg/dL    Sodium 136 136 - 145 mmol/L    Potassium 4.2 3.5 - 5.3 mmol/L    Chloride 99 98 - 107 mmol/L    Bicarbonate 30 21 - 32 mmol/L    Anion Gap 11 10 - 20 mmol/L    Urea Nitrogen 25 (H) 6 - 23 mg/dL    Creatinine 1.09 (H) 0.50 - 1.05 mg/dL    eGFR 51 (L) >60 mL/min/1.73m*2    Calcium 8.9 8.6 - 10.3 mg/dL    Phosphorus 4.2 2.5 - 4.9 mg/dL    Albumin 3.9 3.4 - 5.0 g/dL       Imaging  MR lumbar spine wo IV contrast  Result Date: 4/24/2025  Degenerative changes of the lumbar spine most pronounced at L3-L4 with moderate " spinal canal stenosis, narrowing of the subarticular recess and moderate bilateral neural foraminal stenosis.   I personally reviewed the images/study and I agree with the findings as stated. This study was interpreted at [facility], [place], [place].   MACRO: None   Signed by: Chastity Chacon 4/24/2025 8:33 AM Dictation workstation:   [id number]      Cardiology, Vascular, and Other Imaging  No other imaging results found for the past 7 days                This patient has a urinary catheter   Reason for the urinary catheter remaining today? urinary retention/bladder outlet obstruction, acute or chronic               Assessment & Plan  Lumbar strain, initial encounter    Follow-up on 84-year-old female with significant urinary retention and overflow incontinence.  - Maintain the Perry catheter for at least 3 to 4 weeks.  She should follow-up as an outpatient for urodynamic studies.  - She looks to be discharged from a  standpoint with maintain the Perry catheter.        I spent 20 minutes in the professional and overall care of this patient.      Fortino Mcfarlane PA-C           [1] acetaminophen, 975 mg, oral, BID  amLODIPine, 10 mg, oral, q AM  apixaban, 5 mg, oral, BID  doxazosin, 1 mg, oral, BID  furosemide, 40 mg, oral, Daily  hydroxychloroquine, 200 mg, oral, q12h  levothyroxine, 25 mcg, oral, Nightly  lidocaine, 1 patch, transdermal, Daily  lisinopril, 40 mg, oral, BID  methotrexate, 15 mg, oral, Weekly  nystatin, 1 Application, Topical, BID  pantoprazole, 40 mg, oral, Daily before breakfast  polyethylene glycol, 17 g, oral, Daily  sulfaSALAzine, 1,000 mg, oral, BID    [2]    [3] PRN medications: acetaminophen **OR** acetaminophen **OR** acetaminophen, acetaminophen **OR** acetaminophen **OR** acetaminophen, albuterol, bisacodyl, cyclobenzaprine, docusate sodium, hydrOXYzine HCL, magnesium hydroxide, oxyCODONE, traMADol

## 2025-04-30 PROCEDURE — 2500000001 HC RX 250 WO HCPCS SELF ADMINISTERED DRUGS (ALT 637 FOR MEDICARE OP): Performed by: INTERNAL MEDICINE

## 2025-04-30 PROCEDURE — 2500000005 HC RX 250 GENERAL PHARMACY W/O HCPCS: Performed by: EMERGENCY MEDICINE

## 2025-04-30 PROCEDURE — 99232 SBSQ HOSP IP/OBS MODERATE 35: CPT

## 2025-04-30 PROCEDURE — 97116 GAIT TRAINING THERAPY: CPT | Mod: GP,CQ

## 2025-04-30 PROCEDURE — 2500000001 HC RX 250 WO HCPCS SELF ADMINISTERED DRUGS (ALT 637 FOR MEDICARE OP)

## 2025-04-30 PROCEDURE — 2500000002 HC RX 250 W HCPCS SELF ADMINISTERED DRUGS (ALT 637 FOR MEDICARE OP, ALT 636 FOR OP/ED)

## 2025-04-30 PROCEDURE — G0378 HOSPITAL OBSERVATION PER HR: HCPCS

## 2025-04-30 RX ADMIN — DOXAZOSIN 1 MG: 2 TABLET ORAL at 21:08

## 2025-04-30 RX ADMIN — HYDROXYCHLOROQUINE SULFATE 200 MG: 200 TABLET ORAL at 11:03

## 2025-04-30 RX ADMIN — TRAMADOL HYDROCHLORIDE 50 MG: 50 TABLET, COATED ORAL at 11:06

## 2025-04-30 RX ADMIN — NYSTATIN 1 APPLICATION: 100000 POWDER TOPICAL at 21:12

## 2025-04-30 RX ADMIN — FUROSEMIDE 40 MG: 40 TABLET ORAL at 08:53

## 2025-04-30 RX ADMIN — DOXAZOSIN 1 MG: 2 TABLET ORAL at 08:52

## 2025-04-30 RX ADMIN — SULFASALAZINE 1000 MG: 500 TABLET ORAL at 08:53

## 2025-04-30 RX ADMIN — AMLODIPINE BESYLATE 10 MG: 10 TABLET ORAL at 08:52

## 2025-04-30 RX ADMIN — PANTOPRAZOLE SODIUM 40 MG: 40 TABLET, DELAYED RELEASE ORAL at 06:12

## 2025-04-30 RX ADMIN — NYSTATIN 1 APPLICATION: 100000 POWDER TOPICAL at 08:53

## 2025-04-30 RX ADMIN — LIDOCAINE 1 PATCH: 4 PATCH TOPICAL at 08:52

## 2025-04-30 RX ADMIN — APIXABAN 5 MG: 5 TABLET, FILM COATED ORAL at 08:52

## 2025-04-30 RX ADMIN — LEVOTHYROXINE SODIUM 25 MCG: 25 TABLET ORAL at 22:35

## 2025-04-30 RX ADMIN — LISINOPRIL 40 MG: 40 TABLET ORAL at 08:53

## 2025-04-30 RX ADMIN — SULFASALAZINE 1000 MG: 500 TABLET ORAL at 21:09

## 2025-04-30 RX ADMIN — APIXABAN 5 MG: 5 TABLET, FILM COATED ORAL at 21:08

## 2025-04-30 RX ADMIN — LISINOPRIL 40 MG: 40 TABLET ORAL at 21:08

## 2025-04-30 RX ADMIN — ACETAMINOPHEN 975 MG: 325 TABLET, FILM COATED ORAL at 22:34

## 2025-04-30 RX ADMIN — ACETAMINOPHEN 975 MG: 325 TABLET, FILM COATED ORAL at 11:03

## 2025-04-30 RX ADMIN — HYDROXYCHLOROQUINE SULFATE 200 MG: 200 TABLET ORAL at 22:35

## 2025-04-30 ASSESSMENT — COGNITIVE AND FUNCTIONAL STATUS - GENERAL
MOVING FROM LYING ON BACK TO SITTING ON SIDE OF FLAT BED WITH BEDRAILS: A LOT
MOBILITY SCORE: 14
WALKING IN HOSPITAL ROOM: A LITTLE
MOVING TO AND FROM BED TO CHAIR: A LITTLE
TURNING FROM BACK TO SIDE WHILE IN FLAT BAD: A LOT
CLIMB 3 TO 5 STEPS WITH RAILING: TOTAL
STANDING UP FROM CHAIR USING ARMS: A LITTLE

## 2025-04-30 ASSESSMENT — PAIN DESCRIPTION - ORIENTATION: ORIENTATION: LOWER;LEFT

## 2025-04-30 ASSESSMENT — PAIN - FUNCTIONAL ASSESSMENT
PAIN_FUNCTIONAL_ASSESSMENT: 0-10
PAIN_FUNCTIONAL_ASSESSMENT: 0-10

## 2025-04-30 ASSESSMENT — PAIN DESCRIPTION - DESCRIPTORS
DESCRIPTORS: DULL
DESCRIPTORS: DULL

## 2025-04-30 ASSESSMENT — PAIN SCALES - GENERAL
PAINLEVEL_OUTOF10: 4
PAINLEVEL_OUTOF10: 6
PAINLEVEL_OUTOF10: 3

## 2025-04-30 ASSESSMENT — PAIN DESCRIPTION - LOCATION: LOCATION: BACK

## 2025-04-30 NOTE — PROGRESS NOTES
Physical Therapy    Physical Therapy Treatment    Patient Name: Jing Bae  MRN: [id number]  Department: [facility]  Room: [facility]  Today's Date: 4/30/2025  Time Calculation  Start Time: 0928  Stop Time: 0951  Time Calculation (min): 23 min         Assessment/Plan   PT Assessment  Evaluation/Treatment Tolerance: Patient tolerated treatment well  End of Session Communication: Bedside nurse  End of Session Patient Position: Up in chair, Alarm off, not on at start of session (no alarm needed per communication with RN)  PT Plan  Inpatient/Swing Bed or Outpatient: Inpatient  PT Plan  Treatment/Interventions: Bed mobility, Transfer training, Gait training, Balance training, Strengthening, Endurance training, Range of motion, Therapeutic exercise, Therapeutic activity, Home exercise program, Positioning  PT Plan: Ongoing PT  PT Frequency: 5 times per week  PT Discharge Recommendations: Moderate intensity level of continued care  PT - OK to Discharge: Yes (once medically cleared for next level of care)      General Visit Information:   PT  Visit  PT Received On: 04/30/25  General  Prior to Session Communication: Bedside nurse  Patient Position Received: Up in chair, Alarm off, not on at start of session  General Comment:  (pt very pleasant and agreeable to participate in therapy)    Subjective   Precautions:  Precautions  Medical Precautions: Fall precautions  Precautions Comment: corona catheter          Objective   Pain:  Pain Assessment  Pain Assessment:  (pt with min c/o back pain; does not provide specific number rating at this time)    Cognition:  Cognition  Orientation Level: Oriented X4     Treatments:  Bed Mobility  Bed Mobility:  (NT - pt seated in chair upon therapy arrival and departure)    Ambulation/Gait Training  Ambulation/Gait Training Performed:  (pt ambulates approx. 50 ft x 2 with FWW and SBA.  slow pace; increased time double stance; somewhat antalgic with decreased RLE step length.  cuing for safe  walker management and utlizing /c BUEs to offload LLE as necessary.)    Transfers  Transfer:  (sit <> stand x2 trials with FWW and SBA/CGA.  cuing for safe hand placement/ sequencing.)    Outcome Measures:  [facility] Basic Mobility  Turning from your back to your side while in a flat bed without using bedrails: A lot  Moving from lying on your back to sitting on the side of a flat bed without using bedrails: A lot  Moving to and from bed to chair (including a wheelchair): A little  Standing up from a chair using your arms (e.g. wheelchair or bedside chair): A little  To walk in hospital room: A little  Climbing 3-5 steps with railing: Total  Basic Mobility - Total Score: 14    Education Documentation  Precautions, taught by Paige Uribe PTA at 4/30/2025 12:57 PM.  Learner: Patient  Readiness: Acceptance  Method: Explanation  Response: Verbalizes Understanding    Mobility Training, taught by Paige Uribe PTA at 4/30/2025 12:57 PM.  Learner: Patient  Readiness: Acceptance  Method: Explanation  Response: Verbalizes Understanding    Education Comments  No comments found.        EDUCATION:       Encounter Problems       Encounter Problems (Active)       PT Problem       PT Goal 1 STG - Pt will transition supine <> sitting with MOD I  (Progressing)       Start:  04/22/25    Expected End:  05/06/25            PT Goal 2 STG - Pt will transfer STS with MOD I  (Progressing)       Start:  04/22/25    Expected End:  05/06/25            PT Goal 3 STG - Pt will amb 75' using FWW with MOD I  (Progressing)       Start:  04/22/25    Expected End:  05/06/25            PT Goal 4 STG - Pt will perform a B LE ther ex program of 2-3 sets of 10  (Progressing)       Start:  04/22/25    Expected End:  05/06/25

## 2025-04-30 NOTE — CARE PLAN
The patient's goals for the shift include pain relief    The clinical goals for the shift include pt will be safe and comftorable      Problem: Pain - Adult  Goal: Verbalizes/displays adequate comfort level or baseline comfort level  Outcome: Progressing     Problem: Safety - Adult  Goal: Free from fall injury  Outcome: Progressing     Problem: Discharge Planning  Goal: Discharge to home or other facility with appropriate resources  Outcome: Progressing     Problem: Chronic Conditions and Co-morbidities  Goal: Patient's chronic conditions and co-morbidity symptoms are monitored and maintained or improved  Outcome: Progressing     Problem: Nutrition  Goal: Nutrient intake appropriate for maintaining nutritional needs  Outcome: Progressing     Problem: Pain  Goal: Takes deep breaths with improved pain control throughout the shift  Outcome: Progressing  Goal: Turns in bed with improved pain control throughout the shift  Outcome: Progressing  Goal: Walks with improved pain control throughout the shift  Outcome: Progressing  Goal: Performs ADL's with improved pain control throughout shift  Outcome: Progressing  Goal: Participates in PT with improved pain control throughout the shift  Outcome: Progressing  Goal: Free from opioid side effects throughout the shift  Outcome: Progressing  Goal: Free from acute confusion related to pain meds throughout the shift  Outcome: Progressing     Problem: Skin  Goal: Decreased wound size/increased tissue granulation at next dressing change  Outcome: Progressing  Goal: Participates in plan/prevention/treatment measures  Outcome: Progressing  Goal: Prevent/manage excess moisture  Outcome: Progressing  Goal: Prevent/minimize sheer/friction injuries  Outcome: Progressing  Goal: Promote/optimize nutrition  Outcome: Progressing  Goal: Promote skin healing  Outcome: Progressing

## 2025-04-30 NOTE — PROGRESS NOTES
Internal Medicine Progress Note         Jing Bae is a 84 y.o. female on day 0 of admission presenting with Lumbar strain, initial encounter.    SUBJECTIVE    Continue with current management.  Patient notes no acute events overnight.  Was able to get up with assistance yesterday. Ambulating the halls with assistance when able.       OBJECTIVE    Vitals:    04/29/25 1516 04/29/25 2016 04/30/25 0344 04/30/25 0500   BP: 109/56 166/68 135/62    BP Location: Right arm Right arm Right arm    Patient Position: Lying Lying Lying    Pulse: 58 56 (!) 48 58   Resp: 18 18 18    Temp: 36.4 °C (97.5 °F) 36.7 °C (98.1 °F) 36.2 °C (97.2 °F)    TempSrc: Temporal Temporal Temporal    SpO2: 92% 95% 94%    Weight:       Height:          Results from last 7 days   Lab Units 04/29/25  0735   WBC AUTO x10*3/uL 4.8   HEMOGLOBIN g/dL 13.6   HEMATOCRIT % 43.0   PLATELETS AUTO x10*3/uL 149*     Results from last 7 days   Lab Units 04/29/25  0735   SODIUM mmol/L 136   POTASSIUM mmol/L 4.2   CHLORIDE mmol/L 99   CO2 mmol/L 30   BUN mg/dL 25*   CREATININE mg/dL 1.09*   CALCIUM mg/dL 8.9   GLUCOSE mg/dL 97       Scheduled Medications  Scheduled Medications[1]       Physical Exam  Physical Exam:  General:  Pleasant and cooperative.  Obese female.  Agitated however answering questions appropriately  HEENT:  Normocephalic, atraumatic, mucus membranes moist.   Neck:  Trachea midline.  No JVD.    Chest:  Clear to auscultation bilaterally. No wheezes, rales, or rhonchi.  CV:  Regular rate and rhythm.  Positive S1/S2.   Abdomen: Bowel sounds present in all four quadrants, abdomen is soft, non-tender, non-distended.  Extremities:  No lower extremity edema or cyanosis.   Neurological:  AAOx3. No focal deficits.  Skin:  Warm and dry.                 ASSESSMENT & PLAN    Lumbosacral strain  Paraspinal muscle tenderness  Morbid obesity  Ambulatory dysfunction secondary to  above  History of rheumatoid arthritis  History of chronic degenerative joint disease    PLAN:  -Continue scheduled Tylenol, Flexeril 3 times daily as needed, lidocaine patch  - Continue tramadol added as needed.  Oxycodone held for now especially because patient is having episodes of constipation.  - Emphasized the importance of trying to ambulate to the best of her ability.  Discussed with nursing staff to try to get patient to walk today in the hospital halls.  - Continue patient's home medications for rheumatoid arthritis  - MRI showed degenerative changes in lower back.  Moderate spinal stenosis, narrowing of the subarticular recess and moderate bilateral neural chaney stenosis  - Referral to neurosurgery in the outpatient setting      CKD stage III  Acute urinary retention     PLAN:  - Monitor patient's creatinine. Currently at baseline.   - Toradol and NSAIDs as needed while patient's creatinine is at baseline.  - Urology consulted because patient continues to have persistent urinary retention.  Urology recommended Perry catheter in place for at least 4 weeks and follow-up outpatient.  - Patient on Flomax and Cardura.  Flomax discontinued due to reduced orthostatic hypotension      CAD  A-fib on Eliquis  Hypertension  Hyperlipidemia  PLAN:  - Continue anticoagulation  - Continue amlodipine and lisinopril  - Continue diuretic  - Continue statin    Constipation    PLAN  -Continue with MiraLAX, Colace, suppositories.  - Encourage ambulation as this can help facilitate having a bowel movement       Chronic Conditions  Hypothyroidism-continue Synthroid  MARTA-continue CPAP        DVT ppx: Eliquis  GI ppx: Protonix  IVF: None  Diet: Regular diet  Consults: PT/OT  CODE STATUS: Full code  Dispo: Awaiting acceptance from a rehab facility prior to discharge.    Blake Allen, DO   Please SecureChat for any further questions  This is a preliminary note, please await attending attestation for final A/P          [1]  acetaminophen, 975 mg, oral, BID  amLODIPine, 10 mg, oral, q AM  apixaban, 5 mg, oral, BID  doxazosin, 1 mg, oral, BID  furosemide, 40 mg, oral, Daily  hydroxychloroquine, 200 mg, oral, q12h  levothyroxine, 25 mcg, oral, Nightly  lidocaine, 1 patch, transdermal, Daily  lisinopril, 40 mg, oral, BID  methotrexate, 15 mg, oral, Weekly  nystatin, 1 Application, Topical, BID  pantoprazole, 40 mg, oral, Daily before breakfast  polyethylene glycol, 17 g, oral, Daily  sulfaSALAzine, 1,000 mg, oral, BID

## 2025-04-30 NOTE — PROGRESS NOTES
Appeal process still in progress. Case number is [id number]. Appeal was started on the 28th.           4/30   If appeal is denied patient will go home with daughter Camila [phone]. And [facility]. Discussed private duty care with daughter as well.

## 2025-05-01 VITALS
HEIGHT: 65 IN | OXYGEN SATURATION: 91 % | WEIGHT: 240.3 LBS | RESPIRATION RATE: 18 BRPM | DIASTOLIC BLOOD PRESSURE: 66 MMHG | TEMPERATURE: 97.2 F | BODY MASS INDEX: 40.04 KG/M2 | HEART RATE: 59 BPM | SYSTOLIC BLOOD PRESSURE: 148 MMHG

## 2025-05-01 PROCEDURE — G0378 HOSPITAL OBSERVATION PER HR: HCPCS

## 2025-05-01 PROCEDURE — 2500000001 HC RX 250 WO HCPCS SELF ADMINISTERED DRUGS (ALT 637 FOR MEDICARE OP): Performed by: INTERNAL MEDICINE

## 2025-05-01 PROCEDURE — 2500000005 HC RX 250 GENERAL PHARMACY W/O HCPCS: Performed by: EMERGENCY MEDICINE

## 2025-05-01 PROCEDURE — 2500000004 HC RX 250 GENERAL PHARMACY W/ HCPCS (ALT 636 FOR OP/ED)

## 2025-05-01 PROCEDURE — 2500000001 HC RX 250 WO HCPCS SELF ADMINISTERED DRUGS (ALT 637 FOR MEDICARE OP)

## 2025-05-01 PROCEDURE — 99232 SBSQ HOSP IP/OBS MODERATE 35: CPT

## 2025-05-01 PROCEDURE — 2500000002 HC RX 250 W HCPCS SELF ADMINISTERED DRUGS (ALT 637 FOR MEDICARE OP, ALT 636 FOR OP/ED)

## 2025-05-01 RX ADMIN — HYDROXYCHLOROQUINE SULFATE 200 MG: 200 TABLET ORAL at 13:16

## 2025-05-01 RX ADMIN — SULFASALAZINE 1000 MG: 500 TABLET ORAL at 20:11

## 2025-05-01 RX ADMIN — TRAMADOL HYDROCHLORIDE 50 MG: 50 TABLET, COATED ORAL at 20:18

## 2025-05-01 RX ADMIN — NYSTATIN 1 APPLICATION: 100000 POWDER TOPICAL at 08:24

## 2025-05-01 RX ADMIN — APIXABAN 5 MG: 5 TABLET, FILM COATED ORAL at 08:24

## 2025-05-01 RX ADMIN — LISINOPRIL 40 MG: 40 TABLET ORAL at 20:11

## 2025-05-01 RX ADMIN — DOXAZOSIN 1 MG: 2 TABLET ORAL at 08:23

## 2025-05-01 RX ADMIN — AMLODIPINE BESYLATE 10 MG: 10 TABLET ORAL at 08:23

## 2025-05-01 RX ADMIN — DOXAZOSIN 1 MG: 2 TABLET ORAL at 20:11

## 2025-05-01 RX ADMIN — LISINOPRIL 40 MG: 40 TABLET ORAL at 08:24

## 2025-05-01 RX ADMIN — PANTOPRAZOLE SODIUM 40 MG: 40 TABLET, DELAYED RELEASE ORAL at 06:38

## 2025-05-01 RX ADMIN — LIDOCAINE 1 PATCH: 4 PATCH TOPICAL at 08:23

## 2025-05-01 RX ADMIN — ACETAMINOPHEN 975 MG: 325 TABLET, FILM COATED ORAL at 13:16

## 2025-05-01 RX ADMIN — SULFASALAZINE 1000 MG: 500 TABLET ORAL at 08:24

## 2025-05-01 RX ADMIN — MAGNESIUM HYDROXIDE 10 ML: 2400 SUSPENSION ORAL at 13:19

## 2025-05-01 RX ADMIN — POLYETHYLENE GLYCOL 3350 17 G: 17 POWDER, FOR SOLUTION ORAL at 08:23

## 2025-05-01 RX ADMIN — FUROSEMIDE 40 MG: 40 TABLET ORAL at 08:23

## 2025-05-01 RX ADMIN — DOCUSATE SODIUM 100 MG: 100 CAPSULE, LIQUID FILLED ORAL at 06:39

## 2025-05-01 RX ADMIN — NYSTATIN 1 APPLICATION: 100000 POWDER TOPICAL at 20:21

## 2025-05-01 RX ADMIN — APIXABAN 5 MG: 5 TABLET, FILM COATED ORAL at 20:11

## 2025-05-01 ASSESSMENT — PAIN - FUNCTIONAL ASSESSMENT: PAIN_FUNCTIONAL_ASSESSMENT: 0-10

## 2025-05-01 ASSESSMENT — PAIN SCALES - GENERAL
PAINLEVEL_OUTOF10: 0 - NO PAIN
PAINLEVEL_OUTOF10: 7

## 2025-05-01 ASSESSMENT — PAIN DESCRIPTION - LOCATION: LOCATION: BACK

## 2025-05-01 ASSESSMENT — PAIN DESCRIPTION - DESCRIPTORS: DESCRIPTORS: ACHING

## 2025-05-01 ASSESSMENT — PAIN DESCRIPTION - ORIENTATION: ORIENTATION: LEFT;LOWER

## 2025-05-01 NOTE — CARE PLAN
The patient's goals for the shift include pain relief    The clinical goals for the shift include pt will be safe and comftorable

## 2025-05-01 NOTE — CONSULTS
Visited pt today for LOS. Pt is doing well stated she is eating okay and enjoys the food here it is similar to what she cooks at home. She stated she previously had some constipation that has since been resolved. Pt denies any nausea, vomiting, diarrhea. Pt also denies any chewing or swallowing concerns and no abdominal pain. No malnutrition indicated. Pt said she likes to follow a low sodium and mediterranean diet and requested additional information on it. I provided her with two handouts from the [facility]. The first handout we spoke about was the low-sodium nutrition therapy and the second was the general healthful mediterranean nutrition therapy. Pt was very pleased with the information. Full nutrition consult not indicated at this time. Re-consult if patient presents nutrition concerns.

## 2025-05-01 NOTE — PROGRESS NOTES
Internal Medicine Progress Note         Jing Bae is a 84 y.o. female on day 0 of admission presenting with Lumbar strain, initial encounter.    SUBJECTIVE    Patient is doing well.  No acute events overnight.  Eager to have an answer on whether she is going home or to a skilled rehab facility.      OBJECTIVE    Vitals:    04/30/25 1518 04/30/25 1924 05/01/25 0333 05/01/25 0721   BP: 133/60 135/62 122/58 171/73   BP Location: Right arm Right arm Right arm Right arm   Patient Position: Lying Lying Lying Lying   Pulse: 58 62 59 57   Resp: 18 18 18 18   Temp: 36.5 °C (97.7 °F) 36.5 °C (97.7 °F) 36.3 °C (97.3 °F) 36.2 °C (97.2 °F)   TempSrc: Temporal Temporal Temporal Temporal   SpO2: 95% 94% 93% 94%   Weight:       Height:          Results from last 7 days   Lab Units 04/29/25  0735   WBC AUTO x10*3/uL 4.8   HEMOGLOBIN g/dL 13.6   HEMATOCRIT % 43.0   PLATELETS AUTO x10*3/uL 149*     Results from last 7 days   Lab Units 04/29/25  0735   SODIUM mmol/L 136   POTASSIUM mmol/L 4.2   CHLORIDE mmol/L 99   CO2 mmol/L 30   BUN mg/dL 25*   CREATININE mg/dL 1.09*   CALCIUM mg/dL 8.9   GLUCOSE mg/dL 97       Scheduled Medications  Scheduled Medications[1]       Physical Exam  Physical Exam:  General:  Pleasant and cooperative.  Obese female.  Agitated however answering questions appropriately  HEENT:  Normocephalic, atraumatic, mucus membranes moist.   Neck:  Trachea midline.  No JVD.    Chest:  Clear to auscultation bilaterally. No wheezes, rales, or rhonchi.  CV:  Regular rate and rhythm.  Positive S1/S2.   Abdomen: Bowel sounds present in all four quadrants, abdomen is soft, non-tender, non-distended.  Extremities:  No lower extremity edema or cyanosis.   Neurological:  AAOx3. No focal deficits.  Skin:  Warm and dry.                 ASSESSMENT & PLAN    Lumbosacral strain  Paraspinal muscle tenderness  Morbid obesity  Ambulatory dysfunction secondary to  above  History of rheumatoid arthritis  History of chronic degenerative joint disease    PLAN:  -Continue scheduled Tylenol, Flexeril 3 times daily as needed, lidocaine patch  - Continue tramadol added as needed.  Oxycodone held for now especially because patient is having episodes of constipation.  - Emphasized the importance of trying to ambulate to the best of her ability.  Discussed with nursing staff to try to get patient to walk today in the hospital halls.  - Continue patient's home medications for rheumatoid arthritis  - MRI showed degenerative changes in lower back.  Moderate spinal stenosis, narrowing of the subarticular recess and moderate bilateral neural chaney stenosis  - Referral to neurosurgery in the outpatient setting      CKD stage III  Acute urinary retention     PLAN:  - Monitor patient's creatinine. Currently at baseline.   - Toradol and NSAIDs as needed while patient's creatinine is at baseline.  - Urology consulted because patient continues to have persistent urinary retention.  Urology recommended Perry catheter in place for at least 4 weeks and follow-up outpatient.  - Patient on Flomax and Cardura.  Flomax discontinued due to reduced orthostatic hypotension      CAD  A-fib on Eliquis  Hypertension  Hyperlipidemia  PLAN:  - Continue anticoagulation  - Continue amlodipine and lisinopril  - Continue diuretic  - Continue statin    Constipation    PLAN  -Continue with MiraLAX, Colace, suppositories.  - Encourage ambulation as this can help facilitate having a bowel movement       Chronic Conditions  Hypothyroidism-continue Synthroid  MARTA-continue CPAP        DVT ppx: Eliquis  GI ppx: Protonix  IVF: None  Diet: Regular diet  Consults: PT/OT  CODE STATUS: Full code  Dispo: Awaiting for the final appeal decision.  If the appeal gets denied, patient will go home with home health care.  If the appeal is approved patient will go to rehab facility.    Blake Allen, DO   Please SecureChat for any  further questions  This is a preliminary note, please await attending attestation for final A/P          [1] acetaminophen, 975 mg, oral, BID  amLODIPine, 10 mg, oral, q AM  apixaban, 5 mg, oral, BID  doxazosin, 1 mg, oral, BID  furosemide, 40 mg, oral, Daily  hydroxychloroquine, 200 mg, oral, q12h  levothyroxine, 25 mcg, oral, Nightly  lidocaine, 1 patch, transdermal, Daily  lisinopril, 40 mg, oral, BID  methotrexate, 15 mg, oral, Weekly  nystatin, 1 Application, Topical, BID  pantoprazole, 40 mg, oral, Daily before breakfast  polyethylene glycol, 17 g, oral, Daily  sulfaSALAzine, 1,000 mg, oral, BID

## 2025-05-01 NOTE — PROGRESS NOTES
05/01/25 1025   Discharge Planning   Home or Post Acute Services Post acute facilities (Rehab/SNF/etc)   Type of Post Acute Facility Services Skilled nursing   Expected Discharge Disposition SNF   Does the patient need discharge transport arranged? Yes   RoundTrip coordination needed? Yes   Has discharge transport been arranged? No   Intensity of Service   Intensity of Service 0-30 min     This TCC received a call from patient's son CHINA.  Per son, [employer] should have a final decision today.  Stated that they have not heard anything at this time.  Will keep son updated if information is heard regarding the status of the appeal.      1432pm:  Received call from patient's son and daughter and they said that the appeal has been approved. the Auth number that the daughter gave me is: [id number]. Facility updated.    1449pm:  MD updated.    1515pm:  DSC contacted to set up transport for patient to [facility].    1539pm:  Received transport time of 630pm to the [facility].  TCC placed call to son to update him of the time.  Bedside nurse provided with transport time and number for report.  Nursing to update patient.   Care transitions to follow.

## 2025-06-04 ENCOUNTER — OFFICE VISIT (OUTPATIENT)
Dept: ORTHOPEDIC SURGERY | Facility: CLINIC | Age: 85
End: 2025-06-04
Payer: MEDICARE

## 2025-06-04 DIAGNOSIS — M54.50 ACUTE LOW BACK PAIN WITHOUT SCIATICA, UNSPECIFIED BACK PAIN LATERALITY: Primary | ICD-10-CM

## 2025-06-04 DIAGNOSIS — M54.50 ACUTE LOW BACK PAIN WITHOUT SCIATICA, UNSPECIFIED BACK PAIN LATERALITY: ICD-10-CM

## 2025-06-04 PROCEDURE — 1036F TOBACCO NON-USER: CPT | Performed by: ORTHOPAEDIC SURGERY

## 2025-06-04 PROCEDURE — 1159F MED LIST DOCD IN RCRD: CPT | Performed by: ORTHOPAEDIC SURGERY

## 2025-06-04 PROCEDURE — 1125F AMNT PAIN NOTED PAIN PRSNT: CPT | Performed by: ORTHOPAEDIC SURGERY

## 2025-06-04 PROCEDURE — 99204 OFFICE O/P NEW MOD 45 MIN: CPT | Performed by: ORTHOPAEDIC SURGERY

## 2025-06-04 RX ORDER — TIZANIDINE 4 MG/1
4 TABLET ORAL EVERY 8 HOURS PRN
Qty: 60 TABLET | Refills: 2 | Status: SHIPPED | OUTPATIENT
Start: 2025-06-04 | End: 2025-06-04 | Stop reason: SDUPTHER

## 2025-06-04 RX ORDER — TIZANIDINE 4 MG/1
4 TABLET ORAL EVERY 8 HOURS PRN
Qty: 60 TABLET | Refills: 2 | Status: SHIPPED | OUTPATIENT
Start: 2025-06-04

## 2025-06-04 ASSESSMENT — PAIN SCALES - GENERAL: PAINLEVEL_OUTOF10: 9

## 2025-06-04 ASSESSMENT — PAIN - FUNCTIONAL ASSESSMENT: PAIN_FUNCTIONAL_ASSESSMENT: 0-10

## 2025-06-04 NOTE — PROGRESS NOTES
84-year-old female with chief complaint of lower back pain.  Her symptoms began about 2 months ago.  She was hospitalized for severe lower back pain flareup and inability to ambulate.  She was in the hospital for about 10 days and then was discharged to rehab.  Symptoms started improving and then about a week ago after a home physical therapy visit she began having increasing lower back pain once again.  She denies any pain or numbness radiating down the legs.  Denies bowel or bladder incontinence.    Medical history is significant for obesity, atrial fibrillation, hypertension, and chronic kidney disease.    On exam she is well-appearing and in no distress.  BMI 40.  No focal deficits.    I reviewed MRI of her lumbar spine.  This shows spondylolisthesis at L4-5 with moderate central and lateral recess stenosis from L3-S1.    84-year-old female with lumbar spinal stenosis, intermittent flareups of axial lower back pain only at this point.  I explained that given the lack of neurologic symptoms I would not recommend any surgery.  I explained there is no surgical care for lower back pain.    I did recommend referral to pain management for trial of injections.  I would also like her to begin an aquatic therapy program following the epidural injection.  Referrals for all this were placed in the system today.    We also discussed the importance of aerobic exercise and weight loss.    She does not require any further follow-up with me unless she begins to develop neurologic symptoms such as radicular pain.    *This note was dictated using speech recognition software and was not corrected for spelling or grammatical errors*    Medical History[1]    Current Medications[2]     Social History     Socioeconomic History    Marital status:      Spouse name: Not on file    Number of children: Not on file    Years of education: Not on file    Highest education level: Not on file   Occupational History    Not on file    Tobacco Use    Smoking status: Former     Current packs/day: 0.00     Average packs/day: 1 pack/day for 50.0 years (50.0 ttl pk-yrs)     Types: Cigarettes     Start date: 1948     Quit date: 1998     Years since quittin.4     Passive exposure: Past    Smokeless tobacco: Never   Substance and Sexual Activity    Alcohol use: Not on file    Drug use: Not on file    Sexual activity: Not on file   Other Topics Concern    Not on file   Social History Narrative    Not on file     Social Drivers of Health     Financial Resource Strain: Low Risk  (2025)    Overall Financial Resource Strain (CARDIA)     Difficulty of Paying Living Expenses: Not hard at all   Food Insecurity: No Food Insecurity (2025)    Hunger Vital Sign     Worried About Running Out of Food in the Last Year: Never true     Ran Out of Food in the Last Year: Never true   Transportation Needs: No Transportation Needs (2025)    PRAPARE - Transportation     Lack of Transportation (Medical): No     Lack of Transportation (Non-Medical): No   Physical Activity: Sufficiently Active (2025)    Exercise Vital Sign     Days of Exercise per Week: 7 days     Minutes of Exercise per Session: 150+ min   Stress: No Stress Concern Present (2025)    [employer] - Occupational Stress Questionnaire     Feeling of Stress : Only a little   Social Connections: Moderately Integrated (2025)    Social Connection and Isolation Panel [NHANES]     Frequency of Communication with Friends and Family: Three times a week     Frequency of Social Gatherings with Friends and Family: Three times a week     Attends Taoism Services: 1 to 4 times per year     Active Member of Clubs or Organizations: Yes     Attends Club or Organization Meetings: 1 to 4 times per year     Marital Status:    Intimate Partner Violence: Not At Risk (2025)    Humiliation, Afraid, Rape, and Kick questionnaire     Fear of Current or  Ex-Partner: No     Emotionally Abused: No     Physically Abused: No     Sexually Abused: No   Housing Stability: Low Risk  (4/21/2025)    Housing Stability Vital Sign     Unable to Pay for Housing in the Last Year: No     Number of Times Moved in the Last Year: 0     Homeless in the Last Year: No       Enrrique Jane MD  Director, [facility]   Department of Orthopaedic Surgery  [facility]  [address] [place] [address]  [phone]       [1]   Past Medical History:  Diagnosis Date    Aortic stenosis 12/03/2023    mild    Atrial fibrillation (Multi) 12/03/2023    Bradycardia 12/03/2023    Off BB    Chest tightness 05/23/2024    Chronic heart failure with preserved ejection fraction 03/06/2025    Chronic kidney disease (CKD) 12/03/2023    Coronary artery calcification 05/23/2024    Coronary calcification      Coronary artery disease involving native coronary artery of native heart without angina pectoris 05/23/2024    Essential (primary) hypertension     Benign hypertension    Hyperlipidemia 10/25/2023    Hypertension 12/03/2023    Localized edema 09/21/2022    Mitral valve regurgitation 12/03/2023    Mild-moderate    Obstructive sleep apnea syndrome 12/03/2023    CPAP    MARTA on CPAP 12/03/2023    Paroxysmal atrial fibrillation (Multi) 12/03/2023    PAF newly diagnosed in August 2018. HR controlled by itself. On Eliquis. [id number] score 4.   [2]   Current Outpatient Medications:     acetaminophen (Tylenol 8 HOUR) 650 mg ER tablet, Take 1 tablet (650 mg) by mouth twice a day., Disp: , Rfl:     albuterol 90 mcg/actuation inhaler, Inhale 2 puffs every 6 hours if needed., Disp: , Rfl:     alendronate (Fosamax) 70 mg tablet, Take 1 tablet (70 mg) by mouth 1 (one) time per week. Every Monday, Disp: , Rfl:     amLODIPine (Norvasc) 10 mg tablet, TAKE 1 TABLET EVERY DAY (Patient taking differently: Take 1 tablet (10 mg) by mouth  once daily in the morning.), Disp: 90 tablet, Rfl: 3    calcium carbonate (Calcium Antacid) 400 mg calcium (1,000 mg) chewable tablet, Chew 400 mg once daily at noon. Take with meals., Disp: , Rfl:     cholecalciferol (Vitamin D-3) 25 mcg (1,000 units) capsule, Take 1 capsule (25 mcg) by mouth once daily at bedtime., Disp: , Rfl:     cyanocobalamin (Vitamin B-12) 500 mcg tablet, Take 1 tablet (500 mcg) by mouth once daily in the morning., Disp: , Rfl:     cyclobenzaprine (Flexeril) 5 mg tablet, Take 1 tablet (5 mg) by mouth 3 times a day as needed for muscle spasms for up to 7 days., Disp: 20 tablet, Rfl: 0    docusate sodium (Colace) 100 mg capsule, Take 1 capsule (100 mg) by mouth every 12 hours if needed for constipation., Disp: , Rfl:     doxazosin (Cardura) 1 mg tablet, TAKE 1 TABLET TWICE DAILY, Disp: 180 tablet, Rfl: 3    Eliquis 5 mg tablet, TAKE 1 TABLET(5 MG) BY MOUTH TWICE DAILY, Disp: 60 tablet, Rfl: 11    flaxseed oiL 1,000 mg capsule, Take 1 capsule (1,000 mg) by mouth once daily in the evening., Disp: , Rfl:     folic acid (Folvite) 1 mg tablet, Take 1 tablet (1 mg) by mouth once daily in the morning., Disp: , Rfl:     hydroxychloroquine (Plaquenil) 200 mg tablet, Take 1 tablet (200 mg) by mouth every 12 hours., Disp: , Rfl:     lansoprazole (Prevacid) 30 mg DR capsule, Take 1 capsule (30 mg) by mouth once daily in the morning. Take before meals., Disp: , Rfl:     Lasix 40 mg tablet, Take 1 tablet (40 mg) by mouth once daily., Disp: , Rfl:     levothyroxine (Synthroid, Levoxyl) 25 mcg tablet, Take 1 tablet (25 mcg) by mouth once daily at bedtime. Takes at 10 pm, Disp: , Rfl:     lisinopril 40 mg tablet, Take 1 tablet (40 mg) by mouth 2 times a day., Disp: , Rfl:     loratadine (Claritin) 10 mg tablet, Take 1 tablet (10 mg) by mouth once daily in the morning., Disp: , Rfl:     methotrexate (Trexall) 2.5 mg tablet, Take 6 tablets (15 mg total) by mouth once a week.  Every Sunday, Disp: , Rfl:      multivitamin tablet, Take 1 tablet by mouth once daily in the morning., Disp: , Rfl:     nystatin (Mycostatin) 100,000 unit/gram powder, Apply 1 Application topically if needed., Disp: , Rfl:     potassium chloride CR 20 mEq ER tablet, Take 1 tablet (20 mEq) by mouth once daily in the morning., Disp: , Rfl:     rosuvastatin (Crestor) 10 mg tablet, Take 1 tablet (10 mg) by mouth once daily. (Patient not taking: Reported on 4/21/2025), Disp: 30 tablet, Rfl: 11    sulfaSALAzine (Azulfidine) 500 mg tablet, Take 2 tablets (1,000 mg) by mouth 2 times a day., Disp: , Rfl:     vitamins A,C,E-zinc-copper 2,148 mcg-113 mg-45 mg-17.4mg tablet, Take 1 tablet by mouth 2 times a day., Disp: , Rfl:

## 2025-06-10 NOTE — PROGRESS NOTES
History of Present Complaint:  The patient was referred to us by Referring Provider: Enrrique Jane MD . this is 85 y.o.  female accompanied by her son CHINA with a past history of morbid obesity BMI 40, MARTA, hypertension, CAD, atrial fibrillation Eliquist, CKD, RA on the Plaquenil, methotrexate, sulfasalazine, chronic lower back pain presenting with stating that by Easter her chronic lower back pain became so severe that she needed to go to the hospital where she was admitted for few days then she was sent to stepdown for rehabilitation and physical therapy and she had some therapy mostly walking.  The patient still have very bad back pain which when started was to the left side of her lower back that now is in the midline.  She denies any radiation to lower extremity no numbness no tingling no saddle paresthesia no incontinence no paralysis.  Patient denies any red flags    From the MRI finding I highly recommend caudal TIM to treat the vertebrogenic pain and moderate stenosis at L3-4 and foraminal stenosis at L4-5    Procedures:   None    Portions of record reviewed for pertinent issues: active problem list, medication list, allergies, family history, social history, notes from last encounter, encounters, lab results, imaging and other available records.    I have personally reviewed the OARRS report for this patient. This report is scanned into the electronic medical record. I have considered the risks of abuse, dependence, addiction and diversion. It showed: No controlled substance  OPIOID RISK ASSESSMENT SCORE 0/26  Aberrant behavior: None  My patient has no underlying substance abuse or alcohol abuse and there's no mental health conditions contributing to the patient's pain.        Diagnostic studies:  4/23/2025 MRI of the lumbar spine showed bone marrow edema and endplate changes Modic type L3-S1 with grade 1 anterolisthesis L4/L5 with severe degenerative changes with moderate canal stenosis at L3-S1 with  moderate right foraminal stenosis and some L4-5 foraminal stenosis and ligamentum flavum hypertrophy:  FINDINGS:  For counting purposes the last lumbarized vertebral body is labeled  L5.      There is grade 1 anterolisthesis of L4 on L5 and trace  anterolisthesis of L5 on S1. Alignment is otherwise maintained.      Vertebral body heights are maintained. Bone marrow signal pattern is  within normal limits.      There is desiccated disc signal throughout the lower thoracic and  lumbar spine. There is moderate disc height loss at L5-S1 and mild  disc height loss at L3-L4 and L4-L5.      The conus terminates at L1-L2 and is unremarkable.      Evaluation by level:      T11-T12: Mild disc bulge. No spinal canal or neural foraminal  stenosis.      T12-L1: No spinal canal or neural foraminal stenosis      L1-L2: Disc bulge and facet arthrosis. No spinal canal stenosis. No  neural foraminal stenosis.      L2-L3: Disc bulge with a small central disc protrusion and bilateral  facet arthrosis. Mild spinal canal stenosis, mild narrowing of the  subarticular recess and mild bilateral neural foraminal stenosis.      L3-L4: Disc bulge, facet arthrosis and ligamentum flavum thickening.  Moderate spinal canal stenosis, narrowing of the subarticular recess  and moderate bilateral neural foraminal stenosis.      L4-L5: Disc bulge, facet arthrosis and ligamentum flavum thickening.  Mild spinal canal stenosis, narrowing of the subarticular recess,  moderate left and mild right neural foraminal stenosis      L5-S1: Disc bulge with a small central disc protrusion and bilateral  facet arthrosis. No spinal canal stenosis. Mild bilateral neural  foraminal stenosis.      There is a small Tarlov cyst at the S3 level.      There is fatty atrophy of the lower paraspinal muscles. Incompletely  evaluated T2 hyperintense lesions within the kidneys likely represent  cysts. Prevertebral soft tissues are not thickened.      IMPRESSION:  Degenerative  changes of the lumbar spine most pronounced at L3-L4  with moderate spinal canal stenosis, narrowing of the subarticular  recess and moderate bilateral neural foraminal stenosis.      I personally reviewed the images/study and I agree with the findings  as stated. This study was interpreted at [facility], [place], [place].      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  Finished high school and beautician school.  Denies smoking drinking or use of illicit drugs      Review of Systems   HENT: Negative.     Eyes: Negative.    Respiratory: Negative.     Cardiovascular: Negative.    Gastrointestinal: Negative.    Endocrine: Negative.    Genitourinary: Negative.    Musculoskeletal:  Positive for back pain, gait problem and myalgias.   Skin: Negative.    Hematological: Negative.    Psychiatric/Behavioral: Negative.            Physical Exam  Vitals and nursing note reviewed.   Constitutional:       Appearance: Normal appearance.       HENT:      Head: Normocephalic and atraumatic.      Nose: Nose normal.   Eyes:      Extraocular Movements: Extraocular movements intact.      Conjunctiva/sclera: Conjunctivae normal.      Pupils: Pupils are equal, round, and reactive to light.   Cardiovascular:      Rate and Rhythm: Normal rate and regular rhythm.      Pulses: Normal pulses.      Heart sounds: Normal heart sounds.   Pulmonary:      Effort: Pulmonary effort is normal.      Breath sounds: Normal breath sounds.   Abdominal:      General: Abdomen is flat. Bowel sounds are normal.      Palpations: Abdomen is soft.   Skin:     General: Skin is warm.   Neurological:      General: No focal deficit present.      Mental Status: She is alert and oriented to person, place, and time.      Cranial Nerves: Cranial nerves 2-12 are intact.       Sensory: Sensation is intact.      Motor: Motor function is intact.      Coordination: Coordination is intact.      Gait: Gait abnormal.      Deep Tendon Reflexes: Reflexes abnormal.      Comments: Loss of bilateral ankle reflexes  Normal vibratory sensation in both lower extremities  Severe tenderness in the lower back  Difficulty going from sitting to standing position walks with a walker her with shopping cart sign   Psychiatric:         Mood and Affect: Mood normal.         Behavior: Behavior normal.            Assessment  Pleasant 85 years old who was with her son TJ with significant lower back pain and significant anxiety about everything.  Patient is afraid of water as well afraid of injections and stopping Eliquis as well etc.  I explained the patient that really there is not much we can do for her wear-and-tear that she has but she could benefit greatly from caudal TIM with decreasing her pain and give her some relief for a while.  In addition aquatic therapy is a great thing for her and I highly recommended.  I already placed a consult for her.  We discussed tramadol therapy and she can take it with acetaminophen 500 mg every 6 hours as needed           Plan  At least 50% of the visit was involved in the discussion of the options for treatment. We discussed exercises, medication, interventional therapies and surgery. Healthy life style is essential with patient hard work to achieve the wellness. In addition; discussion with the patient and/or family about any of the diagnostic results, impressions and/or recommended diagnostic studies, prognosis, risks and benefits of treatment options, instructions for treatment and/or follow-up, importance of compliance with chosen treatment options, risk-factor reduction, and patient/family education.           Recommend self-directed physical therapy with at least daily exercises for minimum of 20-minute  Referral to aquatic therapy  Tramadol 50 mg with acetaminophen  500 mg 4 times daily as needed  Caudal epidural steroid injection patient may cancel if the tramadol work  Healthy lifestyle and anti-inflammatory diet in addition to weight control discussed with the patient  Alternative chronic pain therapies was discussed, encouraged and information was handed  Return to Clinic after injection       *Please note this report has been produced using speech recognition software and may contain errors related to that system including grammar, punctuation and spelling as well as words and phrases that may be inappropriate. If there are questions or concerns, please feel free to contact me to clarify.    Yomaira Mcintyre MD

## 2025-06-12 ENCOUNTER — OFFICE VISIT (OUTPATIENT)
Dept: PAIN MEDICINE | Facility: CLINIC | Age: 85
End: 2025-06-12
Payer: MEDICARE

## 2025-06-12 ENCOUNTER — TELEPHONE (OUTPATIENT)
Dept: CARDIOLOGY | Facility: CLINIC | Age: 85
End: 2025-06-12

## 2025-06-12 VITALS
HEART RATE: 75 BPM | SYSTOLIC BLOOD PRESSURE: 140 MMHG | OXYGEN SATURATION: 95 % | RESPIRATION RATE: 16 BRPM | DIASTOLIC BLOOD PRESSURE: 67 MMHG | BODY MASS INDEX: 42.52 KG/M2 | TEMPERATURE: 97.9 F | HEIGHT: 63 IN | WEIGHT: 240 LBS

## 2025-06-12 DIAGNOSIS — M54.89 VERTEBROGENIC PAIN SYNDROME: ICD-10-CM

## 2025-06-12 DIAGNOSIS — M54.50 ACUTE LOW BACK PAIN WITHOUT SCIATICA, UNSPECIFIED BACK PAIN LATERALITY: ICD-10-CM

## 2025-06-12 DIAGNOSIS — C77.4 SECONDARY AND UNSPECIFIED MALIGNANT NEOPLASM OF INGUINAL AND LOWER LIMB LYMPH NODES: ICD-10-CM

## 2025-06-12 DIAGNOSIS — J84.9 INTERSTITIAL LUNG DISEASE (MULTI): ICD-10-CM

## 2025-06-12 DIAGNOSIS — M48.062 LUMBAR STENOSIS WITH NEUROGENIC CLAUDICATION: Primary | ICD-10-CM

## 2025-06-12 PROCEDURE — 99204 OFFICE O/P NEW MOD 45 MIN: CPT | Performed by: ANESTHESIOLOGY

## 2025-06-12 PROCEDURE — 1036F TOBACCO NON-USER: CPT | Performed by: ANESTHESIOLOGY

## 2025-06-12 PROCEDURE — 3077F SYST BP >= 140 MM HG: CPT | Performed by: ANESTHESIOLOGY

## 2025-06-12 PROCEDURE — 3078F DIAST BP <80 MM HG: CPT | Performed by: ANESTHESIOLOGY

## 2025-06-12 PROCEDURE — 99214 OFFICE O/P EST MOD 30 MIN: CPT | Performed by: ANESTHESIOLOGY

## 2025-06-12 PROCEDURE — 1159F MED LIST DOCD IN RCRD: CPT | Performed by: ANESTHESIOLOGY

## 2025-06-12 RX ORDER — TRAMADOL HYDROCHLORIDE 50 MG/1
50 TABLET, FILM COATED ORAL EVERY 6 HOURS PRN
Qty: 20 TABLET | Refills: 0 | Status: SHIPPED | OUTPATIENT
Start: 2025-06-12 | End: 2025-06-17

## 2025-06-12 SDOH — ECONOMIC STABILITY: FOOD INSECURITY: WITHIN THE PAST 12 MONTHS, YOU WORRIED THAT YOUR FOOD WOULD RUN OUT BEFORE YOU GOT MONEY TO BUY MORE.: NEVER TRUE

## 2025-06-12 SDOH — ECONOMIC STABILITY: FOOD INSECURITY: WITHIN THE PAST 12 MONTHS, THE FOOD YOU BOUGHT JUST DIDN'T LAST AND YOU DIDN'T HAVE MONEY TO GET MORE.: NEVER TRUE

## 2025-06-12 ASSESSMENT — ENCOUNTER SYMPTOMS
DEPRESSION: 0
BACK PAIN: 1
LOSS OF SENSATION IN FEET: 0
HEMATOLOGIC/LYMPHATIC NEGATIVE: 1
RESPIRATORY NEGATIVE: 1
PSYCHIATRIC NEGATIVE: 1
CARDIOVASCULAR NEGATIVE: 1
GASTROINTESTINAL NEGATIVE: 1
ENDOCRINE NEGATIVE: 1
MYALGIAS: 1
EYES NEGATIVE: 1
OCCASIONAL FEELINGS OF UNSTEADINESS: 1

## 2025-06-12 ASSESSMENT — COLUMBIA-SUICIDE SEVERITY RATING SCALE - C-SSRS
2. HAVE YOU ACTUALLY HAD ANY THOUGHTS OF KILLING YOURSELF?: NO
6. HAVE YOU EVER DONE ANYTHING, STARTED TO DO ANYTHING, OR PREPARED TO DO ANYTHING TO END YOUR LIFE?: NO
1. IN THE PAST MONTH, HAVE YOU WISHED YOU WERE DEAD OR WISHED YOU COULD GO TO SLEEP AND NOT WAKE UP?: NO

## 2025-06-12 ASSESSMENT — PATIENT HEALTH QUESTIONNAIRE - PHQ9
SUM OF ALL RESPONSES TO PHQ9 QUESTIONS 1 AND 2: 0
1. LITTLE INTEREST OR PLEASURE IN DOING THINGS: NOT AT ALL
2. FEELING DOWN, DEPRESSED OR HOPELESS: NOT AT ALL

## 2025-06-12 ASSESSMENT — LIFESTYLE VARIABLES: TOTAL SCORE: 0

## 2025-06-12 ASSESSMENT — PAIN - FUNCTIONAL ASSESSMENT: PAIN_FUNCTIONAL_ASSESSMENT: 0-10

## 2025-06-12 ASSESSMENT — PAIN SCALES - GENERAL
PAINLEVEL_OUTOF10: 5 - MODERATE PAIN
PAINLEVEL_OUTOF10: 5

## 2025-06-12 ASSESSMENT — PAIN DESCRIPTION - DESCRIPTORS: DESCRIPTORS: SHARP

## 2025-06-12 NOTE — PROGRESS NOTES
Chief Complaint   Patient presents with    New Patient Visit    Back Pain     History of Present Complaint:  The patient was referred to us by Referring Provider:  Enrrique Jane MD . this is 85 y.o.  female  with a past history of  morbid obesity BMI 40, MARTA, hypertension, CAD, atrial fibrillation, CKD, chronic lower back pain for the last 2 months  presenting with middle and lower back . She was hospitalized 5/1/2025 , admitted for 11 days , then sent to rehab for this pain .    Pain started 2 months .  Pain is better nothing .  Pain is worst walking , standing from a seating position and standing for short period of time .   Has been sleeping in a chair , unable to sleep in her bed,    The pain is described as serve ,constant pain ,sharp   .  Prior Pain Therapies: physical Therapy      Past surgical history:  none    Employment/disability/litigation: retired      Social history: , Has 2children, 1grandchildren and 0great-grandchildren, Finished high school, and cosmetology     Diagnostic studies: MRI studies, CT scan films        Mahesh Each Box that Applies Female Male   FAMILY HISTORY OF SUBSTANCE ABUSE  Mahesh the boxes that applies   Alcohol ?  1    ? 3   Illegal drugs ?  2 ? 3   Rx drugs ?  4 ? 4   PERSONAL HISTORY OF SUBSTNACE ABUSE   Alcohol ?  3 ?  3   Illegal drugs ?  4 ?  4    Rx drugs ?  5 ?  5   Age Between 16-45 years ?  1 ?  1   History of Preadolescent Sexual Abuse ?  3 ?  0   PSYCHOLOGIC DISEASE   ADD, OCD, bipolar, schizophrenia    ?  2 ?  2   Depression ?  1 ?  1   Scoring Totals 0      Scoring (Risk)  0-3 - Low  4-7 - Moderate  8 - High  Opioid Risk Assessment Score 0/26

## 2025-06-12 NOTE — TELEPHONE ENCOUNTER
Patient scheduled for caudal epidural on 6/30/25 with Dr. Mcintyre.    Blood thinner consent form received - can patient hold Eliquis 3 days prior to procedure?      Last OV 3/6/25:  1. Perm Afib. Chads Vasc is 4.No further bleeding. Con't of Eliquis. Off ASA. Con't with AC with DOAC.  2.HTN-BP well controlled. Con't cardura 1 bid, lisinopril 40 BID, amlodipine 10mg/day, and lasix 40mg/day.  3. Lipids- In the past unable to tolerate 3 statin. Now willing to try rosuva 10 mg every other day.  10/24/2024 LDL 95 HDL 47 triglycerides 132 LFTs normal. She'll follow up with Dr. Dupree in 3 months for FBW.   4. 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 Seen by Pulm. Recent breathing test was fine.  5. DCHF-BNP > 300.  She will continue with Lasix 40 mg daily.  Minimal lower extremity edema.  I believe a lot of this is improved since she is been started on CPAP for her MARTA.  6. MARTA-doing great with CPAP.  Feels better.  Lower extremity edema is improved.  7. CAD- mild calcification noted on a CT scan 5/2024. Will try statins when she returns. She does have mild midsternal CP. Often reproducible with palp.  Regadenoson nuclear stress test 6/3/2024 normal perfusion ejection fraction 74%     RTC 6 months. EKG today. Start Rosuva 10 every other day. FBW with DR. Dupree.

## 2025-06-12 NOTE — LETTER
June 12, 2025     Enrrique Jane MD  [address]  Department Of Orthopedics  [facility] [address]    Patient: Jing Bae   YOB: 1940   Date of Visit: 6/12/2025       Dear Dr. Enrrique Jane MD:    Thank you for referring Jing Bae to me for evaluation. Below are my notes for this consultation.  If you have questions, please do not hesitate to call me. I look forward to following your patient along with you.       Sincerely,     Yomaira Mcintyre MD      CC: No Recipients  ______________________________________________________________________________________      History of Present Complaint:  The patient was referred to us by Referring Provider: Enrrique Jane MD . this is 85 y.o.  female accompanied by her son CHINA with a past history of morbid obesity BMI 40, MARTA, hypertension, CAD, atrial fibrillation Eliquist, CKD, RA on the Plaquenil, methotrexate, sulfasalazine, chronic lower back pain presenting with stating that by Easter her chronic lower back pain became so severe that she needed to go to the hospital where she was admitted for few days then she was sent to [facility] for rehabilitation and physical therapy and she had some therapy mostly walking.  The patient still have very bad back pain which when started was to the left side of her lower back that now is in the midline.  She denies any radiation to lower extremity no numbness no tingling no saddle paresthesia no incontinence no paralysis.  Patient denies any red flags    From the MRI finding I highly recommend caudal TIM to treat the vertebrogenic pain and moderate stenosis at L3-4 and foraminal stenosis at L4-5    Procedures:   None    Portions of record reviewed for pertinent issues: active problem list, medication list, allergies, family history, social history, notes from last encounter, encounters, lab results, imaging and other available records.    I have personally reviewed the OARRS report for this  patient. This report is scanned into the electronic medical record. I have considered the risks of abuse, dependence, addiction and diversion. It showed: No controlled substance  OPIOID RISK ASSESSMENT SCORE 0/26  Aberrant behavior: None  My patient has no underlying substance abuse or alcohol abuse and there's no mental health conditions contributing to the patient's pain.        Diagnostic studies:  4/23/2025 MRI of the lumbar spine showed bone marrow edema and endplate changes Modic type L3-S1 with grade 1 anterolisthesis L4/L5 with severe degenerative changes with moderate canal stenosis at L3-S1 with moderate right foraminal stenosis and some L4-5 foraminal stenosis and ligamentum flavum hypertrophy:  FINDINGS:  For counting purposes the last lumbarized vertebral body is labeled  L5.      There is grade 1 anterolisthesis of L4 on L5 and trace  anterolisthesis of L5 on S1. Alignment is otherwise maintained.      Vertebral body heights are maintained. Bone marrow signal pattern is  within normal limits.      There is desiccated disc signal throughout the lower thoracic and  lumbar spine. There is moderate disc height loss at L5-S1 and mild  disc height loss at L3-L4 and L4-L5.      The conus terminates at L1-L2 and is unremarkable.      Evaluation by level:      T11-T12: Mild disc bulge. No spinal canal or neural foraminal  stenosis.      T12-L1: No spinal canal or neural foraminal stenosis      L1-L2: Disc bulge and facet arthrosis. No spinal canal stenosis. No  neural foraminal stenosis.      L2-L3: Disc bulge with a small central disc protrusion and bilateral  facet arthrosis. Mild spinal canal stenosis, mild narrowing of the  subarticular recess and mild bilateral neural foraminal stenosis.      L3-L4: Disc bulge, facet arthrosis and ligamentum flavum thickening.  Moderate spinal canal stenosis, narrowing of the subarticular recess  and moderate bilateral neural foraminal stenosis.      L4-L5: Disc bulge,  facet arthrosis and ligamentum flavum thickening.  Mild spinal canal stenosis, narrowing of the subarticular recess,  moderate left and mild right neural foraminal stenosis      L5-S1: Disc bulge with a small central disc protrusion and bilateral  facet arthrosis. No spinal canal stenosis. Mild bilateral neural  foraminal stenosis.      There is a small Tarlov cyst at the S3 level.      There is fatty atrophy of the lower paraspinal muscles. Incompletely  evaluated T2 hyperintense lesions within the kidneys likely represent  cysts. Prevertebral soft tissues are not thickened.      IMPRESSION:  Degenerative changes of the lumbar spine most pronounced at L3-L4  with moderate spinal canal stenosis, narrowing of the subarticular  recess and moderate bilateral neural foraminal stenosis.      I personally reviewed the images/study and I agree with the findings  as stated. This study was interpreted at [place], [place].      MACRO:  None      Signed by: Chastity Chacon 4/24/2025 8:33 AM    L3-4:    L4-5:    L5-S1        Employment/disability/litigation: Was a [employer]    Social History:  with 2 children and 1 grandchild.  Finished high school and beautician school.  Denies smoking drinking or use of illicit drugs      Review of Systems   HENT: Negative.     Eyes: Negative.    Respiratory: Negative.     Cardiovascular: Negative.    Gastrointestinal: Negative.    Endocrine: Negative.    Genitourinary: Negative.    Musculoskeletal:  Positive for back pain, gait problem and myalgias.   Skin: Negative.    Hematological: Negative.    Psychiatric/Behavioral: Negative.            Physical Exam  Vitals and nursing note reviewed.   Constitutional:       Appearance: Normal appearance.       HENT:      Head: Normocephalic and atraumatic.      Nose: Nose normal.   Eyes:      Extraocular Movements: Extraocular movements intact.      Conjunctiva/sclera: Conjunctivae normal.       Pupils: Pupils are equal, round, and reactive to light.   Cardiovascular:      Rate and Rhythm: Normal rate and regular rhythm.      Pulses: Normal pulses.      Heart sounds: Normal heart sounds.   Pulmonary:      Effort: Pulmonary effort is normal.      Breath sounds: Normal breath sounds.   Abdominal:      General: Abdomen is flat. Bowel sounds are normal.      Palpations: Abdomen is soft.   Skin:     General: Skin is warm.   Neurological:      General: No focal deficit present.      Mental Status: She is alert and oriented to person, place, and time.      Cranial Nerves: Cranial nerves 2-12 are intact.      Sensory: Sensation is intact.      Motor: Motor function is intact.      Coordination: Coordination is intact.      Gait: Gait abnormal.      Deep Tendon Reflexes: Reflexes abnormal.      Comments: Loss of bilateral ankle reflexes  Normal vibratory sensation in both lower extremities  Severe tenderness in the lower back  Difficulty going from sitting to standing position walks with a walker her with shopping cart sign   Psychiatric:         Mood and Affect: Mood normal.         Behavior: Behavior normal.            Assessment  Pleasant 85 years old who was with her son TJ with significant lower back pain and significant anxiety about everything.  Patient is afraid of water as well afraid of injections and stopping Eliquis as well etc.  I explained the patient that really there is not much we can do for her wear-and-tear that she has but she could benefit greatly from caudal TIM with decreasing her pain and give her some relief for a while.  In addition aquatic therapy is a great thing for her and I highly recommended.  I already placed a consult for her.  We discussed tramadol therapy and she can take it with acetaminophen 500 mg every 6 hours as needed           Plan  At least 50% of the visit was involved in the discussion of the options for treatment. We discussed exercises, medication, interventional  therapies and surgery. Healthy life style is essential with patient hard work to achieve the wellness. In addition; discussion with the patient and/or family about any of the diagnostic results, impressions and/or recommended diagnostic studies, prognosis, risks and benefits of treatment options, instructions for treatment and/or follow-up, importance of compliance with chosen treatment options, risk-factor reduction, and patient/family education.           Recommend self-directed physical therapy with at least daily exercises for minimum of 20-minute  Referral to aquatic therapy  Tramadol 50 mg with acetaminophen 500 mg 4 times daily as needed  Caudal epidural steroid injection patient may cancel if the tramadol work  Healthy lifestyle and anti-inflammatory diet in addition to weight control discussed with the patient  Alternative chronic pain therapies was discussed, encouraged and information was handed  Return to Clinic after injection       *Please note this report has been produced using speech recognition software and may contain errors related to that system including grammar, punctuation and spelling as well as words and phrases that may be inappropriate. If there are questions or concerns, please feel free to contact me to clarify.    Yomaira Mcintyre MD

## 2025-06-18 ENCOUNTER — HOSPITAL ENCOUNTER (EMERGENCY)
Facility: HOSPITAL | Age: 85
Discharge: HOME | End: 2025-06-19
Attending: STUDENT IN AN ORGANIZED HEALTH CARE EDUCATION/TRAINING PROGRAM
Payer: MEDICARE

## 2025-06-18 ENCOUNTER — APPOINTMENT (OUTPATIENT)
Dept: RADIOLOGY | Facility: HOSPITAL | Age: 85
End: 2025-06-18
Payer: MEDICARE

## 2025-06-18 ENCOUNTER — APPOINTMENT (OUTPATIENT)
Dept: CARDIOLOGY | Facility: HOSPITAL | Age: 85
End: 2025-06-18
Payer: MEDICARE

## 2025-06-18 DIAGNOSIS — R60.0 PEDAL EDEMA: Primary | ICD-10-CM

## 2025-06-18 LAB
ALBUMIN SERPL BCP-MCNC: 4 G/DL (ref 3.4–5)
ALP SERPL-CCNC: 77 U/L (ref 33–136)
ALT SERPL W P-5'-P-CCNC: 13 U/L (ref 7–45)
ANION GAP SERPL CALC-SCNC: 11 MMOL/L (ref 10–20)
AST SERPL W P-5'-P-CCNC: 19 U/L (ref 9–39)
BASOPHILS # BLD AUTO: 0.03 X10*3/UL (ref 0–0.1)
BASOPHILS NFR BLD AUTO: 0.5 %
BILIRUB SERPL-MCNC: 0.5 MG/DL (ref 0–1.2)
BUN SERPL-MCNC: 34 MG/DL (ref 6–23)
CALCIUM SERPL-MCNC: 9.2 MG/DL (ref 8.6–10.3)
CARDIAC TROPONIN I PNL SERPL HS: 37 NG/L (ref 0–13)
CHLORIDE SERPL-SCNC: 102 MMOL/L (ref 98–107)
CO2 SERPL-SCNC: 29 MMOL/L (ref 21–32)
CREAT SERPL-MCNC: 1.48 MG/DL (ref 0.5–1.05)
EGFRCR SERPLBLD CKD-EPI 2021: 35 ML/MIN/1.73M*2
EOSINOPHIL # BLD AUTO: 0.1 X10*3/UL (ref 0–0.4)
EOSINOPHIL NFR BLD AUTO: 1.8 %
ERYTHROCYTE [DISTWIDTH] IN BLOOD BY AUTOMATED COUNT: 15.9 % (ref 11.5–14.5)
GLUCOSE SERPL-MCNC: 94 MG/DL (ref 74–99)
HCT VFR BLD AUTO: 37.3 % (ref 36–46)
HGB BLD-MCNC: 11.7 G/DL (ref 12–16)
IMM GRANULOCYTES # BLD AUTO: 0.04 X10*3/UL (ref 0–0.5)
IMM GRANULOCYTES NFR BLD AUTO: 0.7 % (ref 0–0.9)
LYMPHOCYTES # BLD AUTO: 1.07 X10*3/UL (ref 0.8–3)
LYMPHOCYTES NFR BLD AUTO: 18.8 %
MAGNESIUM SERPL-MCNC: 2.13 MG/DL (ref 1.6–2.4)
MCH RBC QN AUTO: 31.3 PG (ref 26–34)
MCHC RBC AUTO-ENTMCNC: 31.4 G/DL (ref 32–36)
MCV RBC AUTO: 100 FL (ref 80–100)
MONOCYTES # BLD AUTO: 0.5 X10*3/UL (ref 0.05–0.8)
MONOCYTES NFR BLD AUTO: 8.8 %
NEUTROPHILS # BLD AUTO: 3.96 X10*3/UL (ref 1.6–5.5)
NEUTROPHILS NFR BLD AUTO: 69.4 %
NRBC BLD-RTO: 0 /100 WBCS (ref 0–0)
PLATELET # BLD AUTO: 162 X10*3/UL (ref 150–450)
POTASSIUM SERPL-SCNC: 4.2 MMOL/L (ref 3.5–5.3)
PROT SERPL-MCNC: 6.8 G/DL (ref 6.4–8.2)
RBC # BLD AUTO: 3.74 X10*6/UL (ref 4–5.2)
SODIUM SERPL-SCNC: 138 MMOL/L (ref 136–145)
WBC # BLD AUTO: 5.7 X10*3/UL (ref 4.4–11.3)

## 2025-06-18 PROCEDURE — 71045 X-RAY EXAM CHEST 1 VIEW: CPT | Mod: FOREIGN READ | Performed by: RADIOLOGY

## 2025-06-18 PROCEDURE — 93970 EXTREMITY STUDY: CPT

## 2025-06-18 PROCEDURE — 93005 ELECTROCARDIOGRAM TRACING: CPT

## 2025-06-18 PROCEDURE — 80053 COMPREHEN METABOLIC PANEL: CPT | Performed by: STUDENT IN AN ORGANIZED HEALTH CARE EDUCATION/TRAINING PROGRAM

## 2025-06-18 PROCEDURE — 85025 COMPLETE CBC W/AUTO DIFF WBC: CPT | Performed by: STUDENT IN AN ORGANIZED HEALTH CARE EDUCATION/TRAINING PROGRAM

## 2025-06-18 PROCEDURE — 83735 ASSAY OF MAGNESIUM: CPT | Performed by: STUDENT IN AN ORGANIZED HEALTH CARE EDUCATION/TRAINING PROGRAM

## 2025-06-18 PROCEDURE — 99285 EMERGENCY DEPT VISIT HI MDM: CPT | Mod: 25 | Performed by: STUDENT IN AN ORGANIZED HEALTH CARE EDUCATION/TRAINING PROGRAM

## 2025-06-18 PROCEDURE — 71045 X-RAY EXAM CHEST 1 VIEW: CPT

## 2025-06-18 PROCEDURE — 36415 COLL VENOUS BLD VENIPUNCTURE: CPT | Performed by: STUDENT IN AN ORGANIZED HEALTH CARE EDUCATION/TRAINING PROGRAM

## 2025-06-18 PROCEDURE — 84484 ASSAY OF TROPONIN QUANT: CPT | Performed by: STUDENT IN AN ORGANIZED HEALTH CARE EDUCATION/TRAINING PROGRAM

## 2025-06-18 ASSESSMENT — PAIN DESCRIPTION - LOCATION: LOCATION: BACK

## 2025-06-18 ASSESSMENT — PAIN - FUNCTIONAL ASSESSMENT: PAIN_FUNCTIONAL_ASSESSMENT: 0-10

## 2025-06-18 ASSESSMENT — PAIN SCALES - GENERAL: PAINLEVEL_OUTOF10: 4

## 2025-06-19 VITALS
HEART RATE: 77 BPM | DIASTOLIC BLOOD PRESSURE: 82 MMHG | OXYGEN SATURATION: 93 % | SYSTOLIC BLOOD PRESSURE: 185 MMHG | WEIGHT: 230 LBS | BODY MASS INDEX: 40.75 KG/M2 | TEMPERATURE: 97.9 F | RESPIRATION RATE: 20 BRPM | HEIGHT: 63 IN

## 2025-06-19 LAB
ATRIAL RATE: 81 BPM
CARDIAC TROPONIN I PNL SERPL HS: 44 NG/L (ref 0–13)
P AXIS: 232 DEGREES
PR INTERVAL: 149 MS
Q ONSET: 249 MS
QRS COUNT: 11 BEATS
QRS DURATION: 112 MS
QT INTERVAL: 435 MS
QTC CALCULATION(BAZETT): 477 MS
QTC FREDERICIA: 462 MS
R AXIS: 102 DEGREES
T AXIS: 25 DEGREES
T OFFSET: 467 MS
VENTRICULAR RATE: 72 BPM

## 2025-06-19 PROCEDURE — 36415 COLL VENOUS BLD VENIPUNCTURE: CPT | Performed by: STUDENT IN AN ORGANIZED HEALTH CARE EDUCATION/TRAINING PROGRAM

## 2025-06-19 PROCEDURE — 2500000004 HC RX 250 GENERAL PHARMACY W/ HCPCS (ALT 636 FOR OP/ED): Performed by: EMERGENCY MEDICINE

## 2025-06-19 PROCEDURE — 96374 THER/PROPH/DIAG INJ IV PUSH: CPT

## 2025-06-19 PROCEDURE — 84484 ASSAY OF TROPONIN QUANT: CPT | Performed by: STUDENT IN AN ORGANIZED HEALTH CARE EDUCATION/TRAINING PROGRAM

## 2025-06-19 RX ORDER — FUROSEMIDE 10 MG/ML
40 INJECTION INTRAMUSCULAR; INTRAVENOUS ONCE
Status: COMPLETED | OUTPATIENT
Start: 2025-06-19 | End: 2025-06-19

## 2025-06-19 RX ADMIN — FUROSEMIDE 40 MG: 10 INJECTION, SOLUTION INTRAMUSCULAR; INTRAVENOUS at 01:48

## 2025-06-19 NOTE — ED PROVIDER NOTES
EMERGENCY DEPARTMENT ENCOUNTER      Pt Name: Jing Bae  MRN: [id number]  Birthdate 1940  Date of evaluation: 6/18/2025  Provider: Daniel Brody DO    CHIEF COMPLAINT       Chief Complaint   Patient presents with    Leg Swelling     Pt states she was on tramadol but stopped taking it Monday because she started having bilat leg swelling. Pt denies any sob or cp. Pt states she is on eliquis.        HISTORY OF PRESENT ILLNESS    Jing Bae is a 85 y.o. female who presents to the emergency department with Family member for increased swelling in bilateral lower extremities.  She notes that she was recently on tramadol and discontinued this medication as the swelling started at that time.  She has taken an additional dose 24 hours ago of her Lasix per the recommendation of her primary physician.  She was sent to the emergency department by her primary physician to have DVT ruled out.  She does report that she has been compliant with her anticoagulation.  She takes this for history of atrial fibrillation.  She denies any history of heart failure exacerbations.  No additional changes in her medications either.  She has otherwise been in her usual state of health.          Nursing Notes were reviewed.    REVIEW OF SYSTEMS     CONSTITUTIONAL: Denies fever, sweats, chills.   NEURO: Denies difficulty walking, numbness, weakness, tingling, headache.   HEENT: Denies sore throat, rhinorrhea, changes in vision.   CARDIO: Denies chest pain, palpitations.  PULM: Denies shortness of breath, cough.   GI: Denies abdominal pain, nausea, vomiting, diarrhea, constipation, melena, hematochezia.  : Denies painful urination, frequency, hematuria.   MSK: Endorses lower extremity swelling.  SKIN: Denies rash, lesions.   ENDOCRINE: Denies unexpected weight-loss.   HEME: Denies bleeding disorder.     PAST MEDICAL HISTORY   Medical History[1]    SURGICAL HISTORY     Surgical History[2]    ALLERGIES     Bactrim  [sulfamethoxazole-trimethoprim], Ciprofloxacin, Ibuprofen, Penicillins, Rosuvastatin, Statins-hmg-coa reductase inhibitors, Trimethoprim, and Zoster vaccine live (pf)    FAMILY HISTORY     Family History[3]     SOCIAL HISTORY     Social History[4]    PHYSICAL EXAM   VS: As documented in the triage note from today's date and EMR flowsheet were reviewed.  Gen: Well developed. No acute distress. Seated in bed. Appears nontoxic.   Skin: Warm. Dry. Intact. No rashes or lesions.  Chronic discoloration of bilateral shins patient reports minimal erythema is baseline for her.  Skin breakdown or signs of infection.  Eyes: Pupils equally round and reactive to light. Clear sclera. EOMI.  HENT: Atraumatic appearance. Mucosal membranes moist. No oral lesions, uvula midline, airway patent.   CV: Regular rate and regular rhythm. S1, S2.  +2 bilateral pitting pedal edema. Warm extremities.  Resp: Nonlabored breathing Clear to auscultation bilaterally. No increased work of breathing.   GI: Soft and nontender. No rebound or guarding. Bowel sounds x4 present.   MSK: Symmetric muscle bulk. No joint swelling in the extremities. Compartments are soft. Neurovascularly intact x4 extremities. Radial pulses +2 equal bilaterally.  Pedal pulses +2 equal bilaterally.  Neuro: Alert. CN II - XII intact. Speech fluent. Moving all extremities. No focal deficits. Gait normal.  Psych: Appropriate. Kempt.    DIAGNOSTIC RESULTS   RADIOLOGY:   Non-plain film images such as CT, Ultrasound and MRI are read by the radiologist. Plain radiographic images are visualized and preliminarily interpreted by the emergency physician with the below findings: Chest x-ray does show pulmonary congestion.      Interpretation per the Radiologist below, if available at the time of this note:    XR chest 1 view   Final Result   Pulmonary vascular prominence which may represent vascular congestion.   .   Signed by Benedicto Akhtar MD      Vascular US lower extremity venous duplex  "bilateral   Final Result   Excluding the bilateral posterior tibial and peroneal veins which   were insufficiently visualized, no evidence of DVT in the bilateral   lower extremities.             MACRO:   None.        Signed by: Dae Alexander 6/18/2025 10:31 PM   Dictation workstation:   [id number]            ED BEDSIDE ULTRASOUND:   Performed by ED Physician - none    LABS:  Labs Reviewed   TROPONIN SERIES- (INITIAL, 1 HR)    Narrative:     The following orders were created for panel order Troponin I Series, High Sensitivity (0, 1 HR).  Procedure                               Abnormality         Status                     ---------                               -----------         ------                     Troponin I, High Sensiti...[[id number]]                                                 Troponin, High Sensitivi...[[id number]]                                                   Please view results for these tests on the individual orders.   CBC WITH AUTO DIFFERENTIAL   COMPREHENSIVE METABOLIC PANEL   MAGNESIUM   SERIAL TROPONIN-INITIAL   SERIAL TROPONIN, 1 HOUR       All other labs were within normal range or not returned as of this dictation.    EMERGENCY DEPARTMENT COURSE/MDM:   Vitals:    Vitals:    06/18/25 2008   BP: (!) 185/82   Pulse: 73   Resp: 20   Temp: 36.6 °C (97.9 °F)   SpO2: 95%   Weight: 104 kg (230 lb)   Height: 1.6 m (5' 3\")       I reviewed the patient's triage vitals and they are hypertensive recommend follow-up with primary physician for repeat checks.    Due to the above findings the following was ordered basic labs include duplex of the lower extremities.    Duplex of the lower extremity shows no evidence of DVT.  Chest x-ray does show pulmonary congestion.  I do believe that there may be a component of heart failure patient may need diuresis will await lab work.  Patient saturating appropriately is in no acute distress.  She has good pedal pulses I see no indication to pursue arterial " imaging.  There are no signs of infection either.  Patient was signed out to the oncoming physician to follow-up on lab work and disposition.    ED Course as of 06/20/25 1121 Wed Jun 18, 2025 2245 Interpreted by the Emergency Department Attending: ECG revealed atrial fibrillation at a rate of 72 beats per minute with HI interval * , QRS of 112 , QTc of 477.  No acute injury pattern. Previous EKG on March 6 revealed no significant changes.    [MG]      ED Course User Index  [MG] Daniel Brody DO         Diagnoses as of 06/20/25 1121   Pedal edema       Patient was counseled regarding labs, imaging, likely diagnosis, and plan. All questions were answered.     ------------------------------------------------------------------  Information provided by the patient and family  Past medical history complicating workup atrial fibrillation on anticoagulation  Previous medical records reviewed office visit 6/12/2025  Considered CTA although no indication  ------------------------------------------------------------------  ED Medications administered this visit:  Medications - No data to display       Final Impression:   1. Pedal edema          Daniel Brody DO    (Please note that portions of this note were completed with a voice recognition program.  Efforts were made to edit the dictations but occasionally words are mis-transcribed.)       [1]   Past Medical History:  Diagnosis Date    Aortic stenosis 12/03/2023    mild    Atrial fibrillation (Multi) 12/03/2023    Bradycardia 12/03/2023    Off BB    Chest tightness 05/23/2024    Chronic heart failure with preserved ejection fraction 03/06/2025    Chronic kidney disease (CKD) 12/03/2023    Coronary artery calcification 05/23/2024    Coronary calcification      Coronary artery disease involving native coronary artery of native heart without angina pectoris 05/23/2024    Essential (primary) hypertension     Benign hypertension    Hyperlipidemia 10/25/2023     Hypertension 2023    Localized edema 2022    Mitral valve regurgitation 2023    Mild-moderate    Obstructive sleep apnea syndrome 2023    CPAP    MARTA on CPAP 2023    Paroxysmal atrial fibrillation (Multi) 2023    PAF newly diagnosed in 2018. HR controlled by itself. On Eliquis. [id number] score 4.   [2]   Past Surgical History:  Procedure Laterality Date    HYSTERECTOMY     [3]   Family History  Problem Relation Name Age of Onset    Ovarian cancer Father's Sister     [4]   Social History  Socioeconomic History    Marital status:    Tobacco Use    Smoking status: Former     Current packs/day: 0.00     Average packs/day: 1 pack/day for 50.0 years (50.0 ttl pk-yrs)     Types: Cigarettes     Start date: 1948     Quit date: 1998     Years since quittin.4     Passive exposure: Past    Smokeless tobacco: Never   Substance and Sexual Activity    Alcohol use: Never    Drug use: Never     Social Drivers of Health     Financial Resource Strain: Low Risk  (2025)    Overall Financial Resource Strain (CARDIA)     Difficulty of Paying Living Expenses: Not hard at all   Food Insecurity: No Food Insecurity (2025)    Hunger Vital Sign     Worried About Running Out of Food in the Last Year: Never true     Ran Out of Food in the Last Year: Never true   Transportation Needs: No Transportation Needs (2025)    PRAPARE - Transportation     Lack of Transportation (Medical): No     Lack of Transportation (Non-Medical): No   Physical Activity: Sufficiently Active (2025)    Exercise Vital Sign     Days of Exercise per Week: 7 days     Minutes of Exercise per Session: 150+ min   Stress: No Stress Concern Present (2025)    [employer] - Occupational Stress Questionnaire     Feeling of Stress : Only a little   Social Connections: Moderately Integrated (2025)    Social Connection and Isolation Panel [NHANES]     Frequency of  Communication with Friends and Family: Three times a week     Frequency of Social Gatherings with Friends and Family: Three times a week     Attends [employer] Services: 1 to 4 times per year     Active Member of Clubs or Organizations: Yes     Attends Club or Organization Meetings: 1 to 4 times per year     Marital Status:    Intimate Partner Violence: Not At Risk (4/21/2025)    Humiliation, Afraid, Rape, and Kick questionnaire     Fear of Current or Ex-Partner: No     Emotionally Abused: No     Physically Abused: No     Sexually Abused: No   Housing Stability: Low Risk  (4/21/2025)    Housing Stability Vital Sign     Unable to Pay for Housing in the Last Year: No     Number of Times Moved in the Last Year: 0     Homeless in the Last Year: No        Daniel Brody DO  06/20/25 1125

## 2025-06-19 NOTE — PROGRESS NOTES
Jing Bae is a 85 y.o. signed out to me at 2300 hrs. for follow-up on delta troponin.  Patient had been here for leg swelling and edema.  Duplex was negative.  Will continue previous physicians plan for dose of Lasix 40 mg IV and follow-up with her primary care physician as an outpatient.  All questions were answered.  Return precautions discussed.

## 2025-06-30 ENCOUNTER — APPOINTMENT (OUTPATIENT)
Dept: PAIN MEDICINE | Facility: CLINIC | Age: 85
End: 2025-06-30
Payer: MEDICARE

## 2025-07-02 PROBLEM — R59.0 LOCALIZED ENLARGED LYMPH NODES: Status: ACTIVE | Noted: 2025-04-25

## 2025-07-02 PROBLEM — N18.31 STAGE 3A CHRONIC KIDNEY DISEASE (MULTI): Status: ACTIVE | Noted: 2023-12-03

## 2025-07-02 PROBLEM — R05.3 POST-COVID CHRONIC COUGH: Status: ACTIVE | Noted: 2025-05-27

## 2025-07-02 PROBLEM — Z86.73 HX OF COMPLETED STROKE: Status: ACTIVE | Noted: 2025-02-24

## 2025-07-02 PROBLEM — E66.01 MORBID OBESITY (MULTI): Status: ACTIVE | Noted: 2023-10-25

## 2025-07-02 PROBLEM — D72.829 ELEVATED WHITE BLOOD CELL COUNT, UNSPECIFIED: Status: ACTIVE | Noted: 2025-04-25

## 2025-07-02 PROBLEM — U09.9 POST-COVID CHRONIC COUGH: Status: ACTIVE | Noted: 2025-05-27

## 2025-07-02 PROBLEM — A41.9 SEPSIS, UNSPECIFIED ORGANISM (MULTI): Status: ACTIVE | Noted: 2025-04-25

## 2025-07-02 PROBLEM — M48.061 SPINAL STENOSIS, LUMBAR REGION WITHOUT NEUROGENIC CLAUDICATION: Status: ACTIVE | Noted: 2025-04-25

## 2025-07-02 PROBLEM — M62.81 MUSCLE WEAKNESS (GENERALIZED): Status: ACTIVE | Noted: 2025-05-02

## 2025-07-18 LAB
ATRIAL RATE: 81 BPM
P AXIS: 232 DEGREES
PR INTERVAL: 149 MS
Q ONSET: 249 MS
QRS COUNT: 11 BEATS
QRS DURATION: 112 MS
QT INTERVAL: 435 MS
QTC CALCULATION(BAZETT): 477 MS
QTC FREDERICIA: 462 MS
R AXIS: 102 DEGREES
T AXIS: 25 DEGREES
T OFFSET: 467 MS
VENTRICULAR RATE: 72 BPM

## 2025-07-23 ENCOUNTER — OFFICE VISIT (OUTPATIENT)
Dept: CARDIOLOGY | Facility: CLINIC | Age: 85
End: 2025-07-23
Payer: MEDICARE

## 2025-07-23 VITALS
DIASTOLIC BLOOD PRESSURE: 84 MMHG | BODY MASS INDEX: 41.1 KG/M2 | WEIGHT: 232 LBS | OXYGEN SATURATION: 95 % | SYSTOLIC BLOOD PRESSURE: 136 MMHG | HEART RATE: 66 BPM

## 2025-07-23 DIAGNOSIS — I10 PRIMARY HYPERTENSION: Chronic | ICD-10-CM

## 2025-07-23 DIAGNOSIS — I50.32 CHRONIC HEART FAILURE WITH PRESERVED EJECTION FRACTION: Chronic | ICD-10-CM

## 2025-07-23 DIAGNOSIS — I25.10 CORONARY ARTERY DISEASE INVOLVING NATIVE CORONARY ARTERY OF NATIVE HEART WITHOUT ANGINA PECTORIS: Chronic | ICD-10-CM

## 2025-07-23 DIAGNOSIS — E66.01 MORBID OBESITY (MULTI): ICD-10-CM

## 2025-07-23 DIAGNOSIS — R60.0 EDEMA LEG: ICD-10-CM

## 2025-07-23 DIAGNOSIS — R00.1 BRADYCARDIA: Chronic | ICD-10-CM

## 2025-07-23 DIAGNOSIS — I48.21 PERMANENT ATRIAL FIBRILLATION (MULTI): ICD-10-CM

## 2025-07-23 DIAGNOSIS — I34.0 NONRHEUMATIC MITRAL VALVE REGURGITATION: Chronic | ICD-10-CM

## 2025-07-23 DIAGNOSIS — I35.8 AORTIC VALVE SCLEROSIS: Primary | Chronic | ICD-10-CM

## 2025-07-23 DIAGNOSIS — E78.2 MIXED HYPERLIPIDEMIA: Chronic | ICD-10-CM

## 2025-07-23 PROBLEM — M48.061 SPINAL STENOSIS, LUMBAR REGION WITHOUT NEUROGENIC CLAUDICATION: Status: RESOLVED | Noted: 2025-04-25 | Resolved: 2025-07-23

## 2025-07-23 PROBLEM — R91.1 LUNG NODULE: Status: RESOLVED | Noted: 2024-04-29 | Resolved: 2025-07-23

## 2025-07-23 PROBLEM — R05.3 POST-COVID CHRONIC COUGH: Status: RESOLVED | Noted: 2025-05-27 | Resolved: 2025-07-23

## 2025-07-23 PROBLEM — S39.012A LUMBAR STRAIN, INITIAL ENCOUNTER: Status: RESOLVED | Noted: 2025-04-21 | Resolved: 2025-07-23

## 2025-07-23 PROBLEM — M62.81 MUSCLE WEAKNESS (GENERALIZED): Status: RESOLVED | Noted: 2025-05-02 | Resolved: 2025-07-23

## 2025-07-23 PROBLEM — M25.559 ARTHRALGIA OF HIP: Status: RESOLVED | Noted: 2024-05-07 | Resolved: 2025-07-23

## 2025-07-23 PROBLEM — I35.0 AORTIC STENOSIS: Chronic | Status: RESOLVED | Noted: 2023-12-03 | Resolved: 2025-07-23

## 2025-07-23 PROBLEM — B37.2 CANDIDIASIS OF SKIN: Status: RESOLVED | Noted: 2024-04-29 | Resolved: 2025-07-23

## 2025-07-23 PROBLEM — Z71.85 VACCINE COUNSELING: Status: RESOLVED | Noted: 2023-12-03 | Resolved: 2025-07-23

## 2025-07-23 PROBLEM — M54.50 LOWER BACK PAIN: Status: RESOLVED | Noted: 2023-12-03 | Resolved: 2025-07-23

## 2025-07-23 PROBLEM — U09.9 POST-COVID CHRONIC COUGH: Status: RESOLVED | Noted: 2025-05-27 | Resolved: 2025-07-23

## 2025-07-23 PROBLEM — R09.89 PULMONARY VASCULAR CONGESTION: Status: RESOLVED | Noted: 2023-12-03 | Resolved: 2025-07-23

## 2025-07-23 PROBLEM — R59.0 LOCALIZED ENLARGED LYMPH NODES: Status: RESOLVED | Noted: 2025-04-25 | Resolved: 2025-07-23

## 2025-07-23 PROCEDURE — 3079F DIAST BP 80-89 MM HG: CPT | Performed by: INTERNAL MEDICINE

## 2025-07-23 PROCEDURE — 1160F RVW MEDS BY RX/DR IN RCRD: CPT | Performed by: INTERNAL MEDICINE

## 2025-07-23 PROCEDURE — 99215 OFFICE O/P EST HI 40 MIN: CPT | Performed by: INTERNAL MEDICINE

## 2025-07-23 PROCEDURE — 1159F MED LIST DOCD IN RCRD: CPT | Performed by: INTERNAL MEDICINE

## 2025-07-23 PROCEDURE — 99212 OFFICE O/P EST SF 10 MIN: CPT | Performed by: INTERNAL MEDICINE

## 2025-07-23 PROCEDURE — 3075F SYST BP GE 130 - 139MM HG: CPT | Performed by: INTERNAL MEDICINE

## 2025-07-23 NOTE — PATIENT INSTRUCTIONS
1. Perm Afib. Chads Vasc is 4.No further bleeding. Con't of Eliquis. Off ASA. Con't with AC with DOAC.     2.HTN-BP well controlled. Con't cardura 1 bid, lisinopril 40 BID, amlodipine 10mg/day, and lasix 40mg/day.     3. Lipids- In the past unable to tolerate 3 statin. Now willing to try rosuva 10 mg every other day.  10/24/2024 LDL 95 HDL 47 triglycerides 132 LFTs normal. She'll follow up with Dr. Dupree in 3 months for FBW.      4. 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 Seen by Pulm. Recent breathing test was fine.     5. DCHF-BNP > 300.  She will continue with Lasix 40 mg daily.  LE edema better now.  She wasn't on her CPAP at the [facility].      6. MARTA-doing great with CPAP.  Feels better.  Lower extremity edema is improved.    7. CAD- mild calcification noted on a CT scan 5/2024. Will try statins when she returns. She does have mild midsternal CP. Often reproducible with palp.  Regadenoson nuclear stress test 6/3/2024 normal perfusion ejection fraction 74%    Hospital records reviewed. ER visit reports were reviewed.  
Statement Selected

## 2025-07-23 NOTE — PROGRESS NOTES
Referred by Leia CARLIN   I'm seeing Jing for a 4 month follow up. Admitted for 11 days in April due to severe back pain. Discharge to SNF. Then had Covid. She came back to ER due to LE edema on   6/18/25. She  was given lasix and had a good UO. LE edema improved.  This was likely related to immobility with COVID and not using her CPAP x 17 days.  Past Medical History:  Problem List Items Addressed This Visit    None     Past Medical History:   Diagnosis Date    Aortic stenosis 12/03/2023    mild    Atrial fibrillation (Multi) 12/03/2023    Bradycardia 12/03/2023    Off BB    Chest tightness 05/23/2024    Chronic heart failure with preserved ejection fraction 03/06/2025    Chronic kidney disease (CKD) 12/03/2023    Coronary artery calcification 05/23/2024    Coronary calcification      Coronary artery disease involving native coronary artery of native heart without angina pectoris 05/23/2024    Essential (primary) hypertension     Benign hypertension    Hyperlipidemia 10/25/2023    Hypertension 12/03/2023    Localized edema 09/21/2022    Mitral valve regurgitation 12/03/2023    Mild-moderate    Obstructive sleep apnea syndrome 12/03/2023    CPAP    MARTA on CPAP 12/03/2023    Paroxysmal atrial fibrillation (Multi) 12/03/2023    PAF newly diagnosed in August 2018. HR controlled by itself. On Eliquis. [id number] score 4.      Past Surgical History:  She has a past surgical history that includes Hysterectomy.      Social History:  She reports that she quit smoking about 27 years ago. Her smoking use included cigarettes. She started smoking about 77 years ago. She has a 50 pack-year smoking history. She has been exposed to tobacco smoke. She has never used smokeless tobacco. She reports that she does not drink alcohol and does not use drugs.    Family History:  Family History   Problem Relation Name Age of Onset    Ovarian cancer Father's Sister       Allergies:  Bactrim [sulfamethoxazole-trimethoprim],  Ciprofloxacin, Ibuprofen, Penicillins, Rosuvastatin, Statins-hmg-coa reductase inhibitors, Trimethoprim, and Zoster vaccine live (pf)    Outpatient Medications:  Current Outpatient Medications   Medication Instructions    acetaminophen (TYLENOL 8 HOUR) 650 mg, 2 times daily    albuterol 90 mcg/actuation inhaler Inhale 2 puffs every 6 hours if needed.    alendronate (Fosamax) 70 mg tablet Take 1 tablet (70 mg) by mouth 1 (one) time per week. Every Monday    amLODIPine (NORVASC) 10 mg, oral, Daily    calcium carbonate (CALCIUM ANTACID) 400 mg, Daily with lunch    cholecalciferol (VITAMIN D-3) 25 mcg, Nightly    cyanocobalamin (VITAMIN B-12) 500 mcg, Every morning    cyclobenzaprine (FLEXERIL) 5 mg, oral, 3 times daily PRN    docusate sodium (COLACE) 100 mg, Every 12 hours PRN    doxazosin (CARDURA) 1 mg, oral, 2 times daily    Eliquis 5 mg, oral, 2 times daily    flaxseed oiL 1,000 mg capsule 1 capsule, Every evening    folic acid (FOLVITE) 1 mg, Every morning    hydroxychloroquine (PLAQUENIL) 200 mg, Every 12 hours    lansoprazole (Prevacid) 30 mg DR capsule Take 1 capsule (30 mg) by mouth once daily in the morning. Take before meals.    Lasix 40 mg, Daily    levothyroxine (SYNTHROID, LEVOXYL) 25 mcg, Nightly    lisinopril 40 mg, 2 times daily    loratadine (Claritin) 10 mg tablet 1 tablet, Every morning    methotrexate (TREXALL) 15 mg, Weekly    multivitamin tablet 1 tablet, Every morning    nystatin (Mycostatin) 100,000 unit/gram powder Apply 1 Application topically if needed.    potassium chloride CR 20 mEq ER tablet 1 tablet, Every morning    rosuvastatin (CRESTOR) 10 mg, oral, Daily    sulfaSALAzine (AZULFIDINE) 1,000 mg, 2 times daily    tiZANidine (ZANAFLEX) 4 mg, oral, Every 8 hours PRN    vitamins A,C,E-zinc-copper 2,148 mcg-113 mg-45 mg-17.4mg tablet 1 tablet, 2 times daily     Last Recorded Vitals:  There were no vitals filed for this visit.  Physical Exam  Patient is alert and oriented x3.  HEENT is  unremarkable mucous members are moist  Neck no JVP no bruits upstrokes are full no thyromegaly  Lungs are clear bilaterally.  No wheezing crackles or rales  Heart irregular rhythm normal S1-S2 there is no S3 no murmurs are heard.  Abdomen is soft bs are positive nontender nondistended no organomegaly no pulsatile masses  Extremities have no edema.  Distal pulses present palpable.  Neuro is grossly nonfocal  Skin has no rashes     Last Labs:  CBC -  Lab Results   Component Value Date    WBC 5.7 06/18/2025    HGB 11.7 (L) 06/18/2025    HCT 37.3 06/18/2025     06/18/2025     06/18/2025     CMP -  Lab Results   Component Value Date    CALCIUM 9.2 06/18/2025    PHOS 4.2 04/29/2025    PROT 6.8 06/18/2025    ALBUMIN 4.0 06/18/2025    AST 19 06/18/2025    ALT 13 06/18/2025    ALKPHOS 77 06/18/2025    BILITOT 0.5 06/18/2025     LIPID PANEL -   Lab Results   Component Value Date    CHOL 168 10/24/2024    HDL 46.7 10/24/2024    CHHDL 3.6 10/24/2024    VLDL 26 10/24/2024    TRIG 132 10/24/2024    NHDL 121 10/24/2024     RENAL FUNCTION PANEL -   Lab Results   Component Value Date    K 4.2 06/18/2025    PHOS 4.2 04/29/2025     Lab Results   Component Value Date     (H) 05/15/2023    HGBA1C 5.3 01/22/2024     Procedure    Carotid duplex 3/11/2025 50% bilaterally    Regadenoson nuclear stress test 6/3/2024 normal.  Ejection fraction 74%    CT chest 5/16/24 cor calcification    VENOUS U/S [06/09/2023]: No ev/of DVT.      ECHO [02/03/2023]: EF 55-60%. Low normal RVSF. LA mod dial. RA mild-mod dial. Mild-mod MR. AV sclerosis.Echo 9/4/18 EF 60-65%     ECHO [09/21/2022]: EF 60%, Mild AS     CXR [05/15/2019]: No interval change from 3/1/2018.     PHARM NST [09/04/2018]: Normal - 70%     CXR (03/01/2018): Mild cardiomegaly prominence of central pulm vasculature.     CAROTID [11/27/2017]: Less than 50% stenosis prox MARY / LICA     ECHOÂ [11/08/2016]: EF 55-60%. Mod LVH. Mildly dial LA.          Assessment/Plan   1.  Perm Afib. Chads Vasc is 4.No further bleeding. Con't of Eliquis. Off ASA. Con't with AC with DOAC.     2.HTN-BP well controlled. Con't cardura 1 bid, lisinopril 40 BID, amlodipine 10mg/day, and lasix 40mg/day.     3. Lipids- In the past unable to tolerate 3 statin. Now willing to try rosuva 10 mg every other day.  10/24/2024 LDL 95 HDL 47 triglycerides 132 LFTs normal. She'll follow up with Dr. Dupree in 3 months for FBW.      4. 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 Seen by Pulm. Recent breathing test was fine.     5. DCHF-BNP > 300.  She will continue with Lasix 40 mg daily.  LE edema better now.  She wasn't on her CPAP at the [facility].      6. MARTA-doing great with CPAP.  Feels better.  Lower extremity edema is improved.    7. CAD- mild calcification noted on a CT scan 5/2024. Will try statins when she returns. She does have mild midsternal CP. Often reproducible with palp.  Regadenoson nuclear stress test 6/3/2024 normal perfusion ejection fraction 74%    Hospital records reviewed. ER visit reports were reviewed.    Judy Ray MD     Instructions and follow up

## 2025-08-05 ENCOUNTER — APPOINTMENT (OUTPATIENT)
Dept: PAIN MEDICINE | Facility: CLINIC | Age: 85
End: 2025-08-05
Payer: MEDICARE

## 2025-08-19 ENCOUNTER — EVALUATION (OUTPATIENT)
Dept: PHYSICAL THERAPY | Facility: CLINIC | Age: 85
End: 2025-08-19
Payer: MEDICARE

## 2025-08-19 DIAGNOSIS — M54.50 LOW BACK PAIN: Primary | ICD-10-CM

## 2025-08-19 PROCEDURE — 97110 THERAPEUTIC EXERCISES: CPT | Mod: GP | Performed by: PHYSICAL THERAPIST

## 2025-08-19 PROCEDURE — 97161 PT EVAL LOW COMPLEX 20 MIN: CPT | Mod: GP | Performed by: PHYSICAL THERAPIST

## 2025-09-04 ENCOUNTER — TREATMENT (OUTPATIENT)
Dept: PHYSICAL THERAPY | Facility: CLINIC | Age: 85
End: 2025-09-04
Payer: MEDICARE

## 2025-09-04 DIAGNOSIS — M54.50 LOW BACK PAIN: ICD-10-CM

## 2025-09-04 PROCEDURE — 97110 THERAPEUTIC EXERCISES: CPT | Mod: GP | Performed by: PHYSICAL THERAPIST

## 2025-09-09 ENCOUNTER — APPOINTMENT (OUTPATIENT)
Dept: CARDIOLOGY | Facility: CLINIC | Age: 85
End: 2025-09-09
Payer: MEDICARE